# Patient Record
Sex: FEMALE | Race: WHITE | NOT HISPANIC OR LATINO | Employment: FULL TIME | URBAN - METROPOLITAN AREA
[De-identification: names, ages, dates, MRNs, and addresses within clinical notes are randomized per-mention and may not be internally consistent; named-entity substitution may affect disease eponyms.]

---

## 2017-04-13 ENCOUNTER — OFFICE VISIT - RIVER FALLS (OUTPATIENT)
Dept: FAMILY MEDICINE | Facility: CLINIC | Age: 37
End: 2017-04-13

## 2017-04-13 ASSESSMENT — MIFFLIN-ST. JEOR: SCORE: 1578.16

## 2017-06-22 ENCOUNTER — OFFICE VISIT - RIVER FALLS (OUTPATIENT)
Dept: FAMILY MEDICINE | Facility: CLINIC | Age: 37
End: 2017-06-22

## 2017-06-22 ASSESSMENT — MIFFLIN-ST. JEOR: SCORE: 1587.23

## 2017-07-26 ENCOUNTER — OFFICE VISIT - RIVER FALLS (OUTPATIENT)
Dept: FAMILY MEDICINE | Facility: CLINIC | Age: 37
End: 2017-07-26

## 2017-08-17 ENCOUNTER — OFFICE VISIT - RIVER FALLS (OUTPATIENT)
Dept: FAMILY MEDICINE | Facility: CLINIC | Age: 37
End: 2017-08-17

## 2017-08-17 ASSESSMENT — MIFFLIN-ST. JEOR: SCORE: 1583.6

## 2017-10-27 ENCOUNTER — OFFICE VISIT - RIVER FALLS (OUTPATIENT)
Dept: FAMILY MEDICINE | Facility: CLINIC | Age: 37
End: 2017-10-27

## 2017-10-27 ASSESSMENT — MIFFLIN-ST. JEOR: SCORE: 1577.25

## 2017-11-01 ENCOUNTER — TRANSFERRED RECORDS (OUTPATIENT)
Dept: HEALTH INFORMATION MANAGEMENT | Facility: CLINIC | Age: 37
End: 2017-11-01

## 2017-11-01 LAB — HPV ABSTRACT: NORMAL

## 2017-11-13 ENCOUNTER — OFFICE VISIT - RIVER FALLS (OUTPATIENT)
Dept: FAMILY MEDICINE | Facility: CLINIC | Age: 37
End: 2017-11-13

## 2017-11-13 ASSESSMENT — MIFFLIN-ST. JEOR: SCORE: 1580.88

## 2017-11-14 ENCOUNTER — OFFICE VISIT - RIVER FALLS (OUTPATIENT)
Dept: FAMILY MEDICINE | Facility: CLINIC | Age: 37
End: 2017-11-14

## 2018-01-03 ENCOUNTER — OFFICE VISIT - RIVER FALLS (OUTPATIENT)
Dept: FAMILY MEDICINE | Facility: CLINIC | Age: 38
End: 2018-01-03

## 2018-07-18 ENCOUNTER — OFFICE VISIT - RIVER FALLS (OUTPATIENT)
Dept: FAMILY MEDICINE | Facility: CLINIC | Age: 38
End: 2018-07-18

## 2018-07-18 ASSESSMENT — MIFFLIN-ST. JEOR: SCORE: 1618.08

## 2021-08-13 ENCOUNTER — OFFICE VISIT - RIVER FALLS (OUTPATIENT)
Dept: FAMILY MEDICINE | Facility: CLINIC | Age: 41
End: 2021-08-13
Payer: COMMERCIAL

## 2021-08-13 ASSESSMENT — MIFFLIN-ST. JEOR: SCORE: 1613.54

## 2021-08-14 LAB
CHOLEST SERPL-MCNC: 232 MG/DL
CHOLEST/HDLC SERPL: 4.7 {RATIO}
HBA1C MFR BLD: 7.7 %
HDLC SERPL-MCNC: 49 MG/DL
LDLC SERPL CALC-MCNC: 149 MG/DL
NONHDLC SERPL-MCNC: 183 MG/DL
TRIGL SERPL-MCNC: 205 MG/DL
TSH SERPL DL<=0.005 MIU/L-ACNC: 1.06 MIU/L

## 2021-08-17 ENCOUNTER — COMMUNICATION - RIVER FALLS (OUTPATIENT)
Dept: FAMILY MEDICINE | Facility: CLINIC | Age: 41
End: 2021-08-17
Payer: COMMERCIAL

## 2021-08-24 ENCOUNTER — AMBULATORY - RIVER FALLS (OUTPATIENT)
Dept: FAMILY MEDICINE | Facility: CLINIC | Age: 41
End: 2021-08-24
Payer: COMMERCIAL

## 2021-08-25 ENCOUNTER — COMMUNICATION - RIVER FALLS (OUTPATIENT)
Dept: FAMILY MEDICINE | Facility: CLINIC | Age: 41
End: 2021-08-25
Payer: COMMERCIAL

## 2021-08-25 LAB
BUN SERPL-MCNC: 13 MG/DL (ref 7–25)
BUN/CREAT RATIO - HISTORICAL: ABNORMAL (ref 6–22)
CALCIUM SERPL-MCNC: 9.4 MG/DL (ref 8.6–10.2)
CHLORIDE BLD-SCNC: 104 MMOL/L (ref 98–110)
CHOLEST SERPL-MCNC: 201 MG/DL
CHOLEST/HDLC SERPL: 4 {RATIO}
CO2 SERPL-SCNC: 25 MMOL/L (ref 20–32)
CREAT SERPL-MCNC: 0.73 MG/DL (ref 0.5–1.1)
CREAT UR-MCNC: 87 MG/DL (ref 20–275)
EGFRCR SERPLBLD CKD-EPI 2021: 101 ML/MIN/1.73M2
ERYTHROCYTE [DISTWIDTH] IN BLOOD BY AUTOMATED COUNT: 12.4 % (ref 11–15)
GLUCOSE BLD-MCNC: 180 MG/DL (ref 65–99)
HBA1C MFR BLD: 7.4 %
HCT VFR BLD AUTO: 42.3 % (ref 35–45)
HDLC SERPL-MCNC: 50 MG/DL
HGB BLD-MCNC: 13.9 GM/DL (ref 11.7–15.5)
LDLC SERPL CALC-MCNC: 123 MG/DL
MCH RBC QN AUTO: 29.7 PG (ref 27–33)
MCHC RBC AUTO-ENTMCNC: 32.9 GM/DL (ref 32–36)
MCV RBC AUTO: 90.4 FL (ref 80–100)
MICROALBUMIN UR-MCNC: 0.3 MG/DL
MICROALBUMIN/CREAT UR: 3 MG/G{CREAT}
NONHDLC SERPL-MCNC: 151 MG/DL
PLATELET # BLD AUTO: 330 10*3/UL (ref 140–400)
PMV BLD: 9.6 FL (ref 7.5–12.5)
POTASSIUM BLD-SCNC: 4.5 MMOL/L (ref 3.5–5.3)
RBC # BLD AUTO: 4.68 10*6/UL (ref 3.8–5.1)
SODIUM SERPL-SCNC: 138 MMOL/L (ref 135–146)
TRIGL SERPL-MCNC: 167 MG/DL
WBC # BLD AUTO: 7.2 10*3/UL (ref 3.8–10.8)

## 2021-08-30 ENCOUNTER — COMMUNICATION - RIVER FALLS (OUTPATIENT)
Dept: FAMILY MEDICINE | Facility: CLINIC | Age: 41
End: 2021-08-30
Payer: COMMERCIAL

## 2021-08-30 ENCOUNTER — OFFICE VISIT - RIVER FALLS (OUTPATIENT)
Dept: FAMILY MEDICINE | Facility: CLINIC | Age: 41
End: 2021-08-30
Payer: COMMERCIAL

## 2021-08-31 ENCOUNTER — COMMUNICATION - RIVER FALLS (OUTPATIENT)
Dept: FAMILY MEDICINE | Facility: CLINIC | Age: 41
End: 2021-08-31
Payer: COMMERCIAL

## 2021-08-31 LAB — D DIMER PPP FEU-MCNC: 0.22 MCG/ML FEU

## 2021-09-30 ENCOUNTER — AMBULATORY - RIVER FALLS (OUTPATIENT)
Dept: FAMILY MEDICINE | Facility: CLINIC | Age: 41
End: 2021-09-30
Payer: COMMERCIAL

## 2021-10-01 ENCOUNTER — COMMUNICATION - RIVER FALLS (OUTPATIENT)
Dept: FAMILY MEDICINE | Facility: CLINIC | Age: 41
End: 2021-10-01
Payer: COMMERCIAL

## 2021-10-01 LAB
BUN SERPL-MCNC: 16 MG/DL (ref 7–25)
BUN/CREAT RATIO - HISTORICAL: ABNORMAL (ref 6–22)
CALCIUM SERPL-MCNC: 9.2 MG/DL (ref 8.6–10.2)
CHLORIDE BLD-SCNC: 108 MMOL/L (ref 98–110)
CHOLEST SERPL-MCNC: 184 MG/DL
CHOLEST/HDLC SERPL: 3.8 {RATIO}
CO2 SERPL-SCNC: 22 MMOL/L (ref 20–32)
CREAT SERPL-MCNC: 0.84 MG/DL (ref 0.5–1.1)
CREAT UR-MCNC: 109 MG/DL (ref 20–275)
EGFRCR SERPLBLD CKD-EPI 2021: 85 ML/MIN/1.73M2
ERYTHROCYTE [DISTWIDTH] IN BLOOD BY AUTOMATED COUNT: 12.2 % (ref 11–15)
GLUCOSE BLD-MCNC: 211 MG/DL (ref 65–99)
HBA1C MFR BLD: 7.3 %
HCT VFR BLD AUTO: 43 % (ref 35–45)
HDLC SERPL-MCNC: 48 MG/DL
HGB BLD-MCNC: 14.3 GM/DL (ref 11.7–15.5)
LDLC SERPL CALC-MCNC: 116 MG/DL
MCH RBC QN AUTO: 30.4 PG (ref 27–33)
MCHC RBC AUTO-ENTMCNC: 33.3 GM/DL (ref 32–36)
MCV RBC AUTO: 91.3 FL (ref 80–100)
MICROALBUMIN UR-MCNC: 0.4 MG/DL
MICROALBUMIN/CREAT UR: 4 MG/G{CREAT}
NONHDLC SERPL-MCNC: 136 MG/DL
PLATELET # BLD AUTO: 323 10*3/UL (ref 140–400)
PMV BLD: 9.8 FL (ref 7.5–12.5)
POTASSIUM BLD-SCNC: 4.3 MMOL/L (ref 3.5–5.3)
RBC # BLD AUTO: 4.71 10*6/UL (ref 3.8–5.1)
SODIUM SERPL-SCNC: 138 MMOL/L (ref 135–146)
TRIGL SERPL-MCNC: 95 MG/DL
WBC # BLD AUTO: 5.8 10*3/UL (ref 3.8–10.8)

## 2021-10-04 ENCOUNTER — COMMUNICATION - RIVER FALLS (OUTPATIENT)
Dept: FAMILY MEDICINE | Facility: CLINIC | Age: 41
End: 2021-10-04
Payer: COMMERCIAL

## 2021-10-11 ENCOUNTER — COMMUNICATION - RIVER FALLS (OUTPATIENT)
Dept: FAMILY MEDICINE | Facility: CLINIC | Age: 41
End: 2021-10-11
Payer: COMMERCIAL

## 2021-10-12 ENCOUNTER — COMMUNICATION - RIVER FALLS (OUTPATIENT)
Dept: FAMILY MEDICINE | Facility: CLINIC | Age: 41
End: 2021-10-12
Payer: COMMERCIAL

## 2021-10-21 ENCOUNTER — OFFICE VISIT - RIVER FALLS (OUTPATIENT)
Dept: FAMILY MEDICINE | Facility: CLINIC | Age: 41
End: 2021-10-21
Payer: COMMERCIAL

## 2021-10-21 ASSESSMENT — MIFFLIN-ST. JEOR: SCORE: 1598.12

## 2021-10-26 ENCOUNTER — COMMUNICATION - RIVER FALLS (OUTPATIENT)
Dept: FAMILY MEDICINE | Facility: CLINIC | Age: 41
End: 2021-10-26
Payer: COMMERCIAL

## 2021-11-04 ENCOUNTER — OFFICE VISIT - RIVER FALLS (OUTPATIENT)
Dept: FAMILY MEDICINE | Facility: CLINIC | Age: 41
End: 2021-11-04
Payer: COMMERCIAL

## 2021-11-04 ASSESSMENT — MIFFLIN-ST. JEOR: SCORE: 1597.21

## 2021-11-11 ENCOUNTER — OFFICE VISIT - RIVER FALLS (OUTPATIENT)
Dept: FAMILY MEDICINE | Facility: CLINIC | Age: 41
End: 2021-11-11
Payer: COMMERCIAL

## 2021-11-17 DIAGNOSIS — J45.909 ASTHMA: Primary | ICD-10-CM

## 2021-11-18 ENCOUNTER — TRANSCRIBE ORDERS (OUTPATIENT)
Dept: OTHER | Age: 41
End: 2021-11-18

## 2021-11-18 DIAGNOSIS — J45.909 ASTHMA: Primary | ICD-10-CM

## 2021-11-24 ENCOUNTER — OFFICE VISIT - RIVER FALLS (OUTPATIENT)
Dept: FAMILY MEDICINE | Facility: CLINIC | Age: 41
End: 2021-11-24
Payer: COMMERCIAL

## 2021-12-08 ENCOUNTER — COMMUNICATION - RIVER FALLS (OUTPATIENT)
Dept: FAMILY MEDICINE | Facility: CLINIC | Age: 41
End: 2021-12-08
Payer: COMMERCIAL

## 2021-12-30 ENCOUNTER — AMBULATORY - RIVER FALLS (OUTPATIENT)
Dept: FAMILY MEDICINE | Facility: CLINIC | Age: 41
End: 2021-12-30
Payer: COMMERCIAL

## 2022-01-19 ENCOUNTER — OFFICE VISIT - RIVER FALLS (OUTPATIENT)
Dept: FAMILY MEDICINE | Facility: CLINIC | Age: 42
End: 2022-01-19
Payer: COMMERCIAL

## 2022-02-12 VITALS
BODY MASS INDEX: 41.99 KG/M2 | DIASTOLIC BLOOD PRESSURE: 76 MMHG | OXYGEN SATURATION: 98 % | OXYGEN SATURATION: 99 % | TEMPERATURE: 98 F | BODY MASS INDEX: 42.67 KG/M2 | DIASTOLIC BLOOD PRESSURE: 60 MMHG | HEIGHT: 61 IN | HEIGHT: 61 IN | HEART RATE: 76 BPM | OXYGEN SATURATION: 98 % | HEIGHT: 61 IN | TEMPERATURE: 98.1 F | WEIGHT: 222.6 LBS | BODY MASS INDEX: 43.05 KG/M2 | DIASTOLIC BLOOD PRESSURE: 82 MMHG | BODY MASS INDEX: 42.03 KG/M2 | WEIGHT: 222.4 LBS | SYSTOLIC BLOOD PRESSURE: 108 MMHG | TEMPERATURE: 98.7 F | HEIGHT: 61 IN | WEIGHT: 226 LBS | SYSTOLIC BLOOD PRESSURE: 128 MMHG | SYSTOLIC BLOOD PRESSURE: 116 MMHG | HEART RATE: 77 BPM

## 2022-02-12 VITALS
SYSTOLIC BLOOD PRESSURE: 120 MMHG | HEIGHT: 61 IN | DIASTOLIC BLOOD PRESSURE: 68 MMHG | DIASTOLIC BLOOD PRESSURE: 74 MMHG | HEART RATE: 84 BPM | BODY MASS INDEX: 41.42 KG/M2 | TEMPERATURE: 97.6 F | WEIGHT: 220.2 LBS | TEMPERATURE: 98.3 F | SYSTOLIC BLOOD PRESSURE: 120 MMHG | WEIGHT: 216.4 LBS | HEIGHT: 61 IN | SYSTOLIC BLOOD PRESSURE: 116 MMHG | HEART RATE: 68 BPM | WEIGHT: 219.4 LBS | HEART RATE: 76 BPM | BODY MASS INDEX: 41.23 KG/M2 | DIASTOLIC BLOOD PRESSURE: 64 MMHG | BODY MASS INDEX: 41.57 KG/M2 | TEMPERATURE: 98.5 F

## 2022-02-12 VITALS
SYSTOLIC BLOOD PRESSURE: 114 MMHG | WEIGHT: 227 LBS | HEART RATE: 90 BPM | HEIGHT: 61 IN | DIASTOLIC BLOOD PRESSURE: 64 MMHG | OXYGEN SATURATION: 98 % | BODY MASS INDEX: 42.86 KG/M2 | TEMPERATURE: 98.3 F

## 2022-02-12 VITALS
OXYGEN SATURATION: 98 % | BODY MASS INDEX: 41.16 KG/M2 | HEART RATE: 88 BPM | HEIGHT: 61 IN | BODY MASS INDEX: 41.31 KG/M2 | DIASTOLIC BLOOD PRESSURE: 64 MMHG | WEIGHT: 218.8 LBS | TEMPERATURE: 97.9 F | WEIGHT: 218 LBS | SYSTOLIC BLOOD PRESSURE: 120 MMHG | SYSTOLIC BLOOD PRESSURE: 110 MMHG | BODY MASS INDEX: 41.68 KG/M2 | HEART RATE: 80 BPM | HEART RATE: 63 BPM | DIASTOLIC BLOOD PRESSURE: 72 MMHG | TEMPERATURE: 97.9 F | SYSTOLIC BLOOD PRESSURE: 108 MMHG | HEIGHT: 61 IN | HEIGHT: 61 IN | DIASTOLIC BLOOD PRESSURE: 64 MMHG

## 2022-02-12 VITALS
HEIGHT: 61 IN | HEIGHT: 61 IN | SYSTOLIC BLOOD PRESSURE: 131 MMHG | BODY MASS INDEX: 43.25 KG/M2 | TEMPERATURE: 98.7 F | DIASTOLIC BLOOD PRESSURE: 82 MMHG | BODY MASS INDEX: 43.25 KG/M2 | HEART RATE: 85 BPM

## 2022-02-12 VITALS
WEIGHT: 218.2 LBS | HEART RATE: 76 BPM | TEMPERATURE: 98.5 F | BODY MASS INDEX: 41.19 KG/M2 | DIASTOLIC BLOOD PRESSURE: 72 MMHG | SYSTOLIC BLOOD PRESSURE: 118 MMHG | HEIGHT: 61 IN

## 2022-02-12 VITALS
TEMPERATURE: 98.5 F | HEIGHT: 61 IN | BODY MASS INDEX: 41.8 KG/M2 | DIASTOLIC BLOOD PRESSURE: 76 MMHG | HEART RATE: 80 BPM | SYSTOLIC BLOOD PRESSURE: 104 MMHG

## 2022-02-16 NOTE — PROGRESS NOTES
Patient:   AYAKA ALCANTAR            MRN: [id number]            FIN: [id number]               Age:   41 years     Sex:  Female     :  1980   Associated Diagnoses:   Diabetes mellitus, type 2; Obese   Author:   Daniela Adhikari      Visit Information   Visit type:  Diabetes Self Management Education .    Referral source:  Inocente Gutierrez MD.       Chief Complaint   DM Type II, Obesity       History of Present Illness   Pt with hx of GDM requiring insulin therapy/ pump.  Pt was not tested for DM yearly.    New dx of DM Type II with a1c   Group Detail Date Value w/Units Flags      General Chemistry Hgb A1c 2021  7.3  HI      General Chemistry Hgb A1c 2021  7.4  HI      General Chemistry Hgb A1c 2021  7.7  HI          Discussed nutrition, diabetes, and lifestyle management with pt  Nutrition:  Pt has cut out regular soda, train mix and other high carb foods, trying to eat lower carb until glucose readings   am protein shake, noon and evening meals protein, vegetables, previously had been more starches  Fluids: water, milk   Physical Activity:  ~4x/ week works out with her sister who is a  cardio and weights   Stress:   mod  Sleep: will discuss during follow up consults    Support: family, friends   BG Testing: has been testing when not feeling well with lowest being in 130's up to 400's   DM related medication: metformin ER 500mg 4 tabs - taking as directed, will be starting GLP-1 RA    Routine diabetes care:  will discuss during follow up consults    Dilated Retinal Eye Exam:    Skin:   Dental:   Feet:   Immunizations:   Hgb A1c: 7.3% = 163 mg/dL estimated average glucose      Health Status   Allergies:    Allergic Reactions (Selected)  No Known Medication Allergies   Medications:  (Selected)   Prescriptions  Prescribed  Advair Diskus 100 mcg-50 mcg inhalation powder: 1 puff(s), Inhale, bid, # 60 EA, 0 Refill(s), Type: Maintenance, Pharmacy: [facility] [facility], 1 puff(s) Inhale bid, 60.75, in, 10/21/21 16:29:00 CDT, Height Measured, 222.6, lb, 10/21/21 16:29:00 CD...  Aspir 81 oral delayed release tablet: = 1 tab(s) ( 81 mg ), Oral, daily, # 90 EA, 0 Refill(s), Type: Maintenance, Pharmacy: [facility], 1 tab(s) Oral daily, 60.75, in, 08/30/21 14:20:00 CDT, Height Measured, 226, lb, 08/13/21 9:5...  MetFORMIN (Eqv-Glucophage XR) 500 mg oral tablet, extended release: = 4 tab(s) ( 2,000 mg ), Oral, daily, # 360 tab(s), 1 Refill(s), Type: Maintenance, Pharmacy: [facility], 4 tab(s) Oral daily, 60.75, in, 08/30/21 14:20:00 CDT, Height Measured, 226, lb, 08/1...  Nexplanon 68 mg subcutaneous implant: 1 EA ( 68 mg ), subcutaneous, once, # 1 EA, 0 Refill(s), Type: Soft Stop, other reason (Rx)  Ozempic 2 mg/1.5 mL (0.25 mg or 0.5 mg dose) subcutaneous solution: See Instructions, Instructions: 0.25 x 2 weeks   0.50 x 3 weeks  rotate injection sites, inject once a week, # 1.5 mL, 0 Refill(s), Type: Maintenance, Pharmacy: [facility], 0.25 x 2 weeks ; 0...  Ventolin HFA 90 mcg/inh inhalation aerosol: 2 puff(s), INH, QID, PRN: for wheezing, # 17 g, 1 Refill(s), Type: Maintenance, Pharmacy: [facility], mask and aerochamber needed, 2 puff(s) Inhale qid,PRN:for wheezing, 60.75, in, 08/13/21 9...  accu chek guide test strips: accu chek guide test strips, See Instructions, Instructions: testing 2x/ day, Supply, # 200 EA, 3 Refill(s), Type: Maintenance, Pharmacy: [facility], testing 2x/ day, 60.75, in, 10/21/21 16:2...  desogestrel-ethinyl estradiol 0.15 mg-0.03 mg oral tablet: 1 tab(s), Oral, daily, # 84 tab(s), 0 Refill(s), Type: Maintenance, Pharmacy:  [facility], 1 tab(s) Oral daily, 60.75, in, 10/21/21 16:29:00 CDT, Height Measured, 222.6, lb, 10/21/21 16:29:00...  rosuvastatin 20 mg oral tablet: = 1 tab(s) ( 20 mg ), po, hs, # 90 EA, 1 Refill(s), Type: Maintenance, Pharmacy: [facility], 1 tab(s) Oral hs, 60.75, in, 08/30/21 14:20:00 CDT, Height Measured, 226, lb, 08/13/21 9:51:00 CDT...  valved holding chamber spacer: valved holding chamber spacer, See Instructions, Instructions: use with albuterol, Supply, # 1 EA, 0 Refill(s), Type: Maintenance, Pharmacy: [facility], use with albuterol, 60.75, in, 08/13/2...,    Medications          *denotes recorded medication          valved holding chamber spacer: See Instructions, use with albuterol, 1 EA, 0 Refill(s).          accu chek guide test strips: See Instructions, testing 2x/ day, 200 EA, 3 Refill(s).          Ventolin HFA 90 mcg/inh inhalation aerosol: 2 puff(s), INH, QID, PRN: for wheezing, 17 g, 1 Refill(s).          Aspir 81 oral delayed release tablet: 81 mg, 1 tab(s), Oral, daily, 90 EA, 0 Refill(s).          desogestrel-ethinyl estradiol 0.15 mg-0.03 mg oral tablet: 1 tab(s), Oral, daily, 84 tab(s), 0 Refill(s).          Nexplanon 68 mg subcutaneous implant: 68 mg, 1 EA, subcutaneous, once, 1 EA, 0 Refill(s).          Advair Diskus 100 mcg-50 mcg inhalation powder: 1 puff(s), Inhale, bid, 60 EA, 0 Refill(s).          MetFORMIN (Eqv-Glucophage XR) 500 mg oral tablet, extended release: 2,000 mg, 4 tab(s), Oral, daily, 360 tab(s), 1 Refill(s).          rosuvastatin 20 mg oral tablet: 20 mg, 1 tab(s), po, hs, 90 EA, 1 Refill(s).          Ozempic 2 mg/1.5 mL (0.25 mg or 0.5 mg dose) subcutaneous solution: See Instructions, 0.25 x 2 weeks   0.50 x 3 weeks  rotate injection sites, inject once a week, 1.5 mL, 0 Refill(s).       Problem  list:    All Problems (Selected)  Diabetes mellitus, type 2 / SNOMED CT 604739212 / Confirmed  Obese / ICD-9-.00 / Probable  Mild major depression / SNOMED CT 125516209 / Confirmed  History of chicken pox / SNOMED CT 308429295 / Confirmed  Abnormal Pap Smear / ICD-9-.9 / Confirmed      Histories   Past Medical History:    Active  Abnormal Pap Smear (796.9)  History of chicken pox (001417708)  Resolved  Pregnancy (195848870):  Resolved on 8/18/1997 at 17 years.  Pregnancy (209708487):  Resolved on 8/12/2003 at 23 years.  Pregnancy (630536013):  Resolved on 1/8/2005 at 24 years.  Pregnancy (093096402):  Resolved on 7/15/2008 at 28 years.  Pregnancy (846771905):  Resolved on 10/6/2010 at 30 years.   Family History:    Diabetes mellitus  Father (Jose)  Asthma  Daughter (Bibi)     Procedure history:    Encounter for initial prescription of implantable subdermal contraceptive (ICD-10-CM Z30.017) performed by Jackie Alvarez on 8/30/2021 at 41 Years.  Comments:  8/30/2021 5:31 PM Dorinda Goldsmith LPN  Nexplanon placed in left arm    Lot# [id number]  Exp. 09/29/2023    Due for removal in 3 years - on or before 08/30/2024  Encounter for surveillance of implantable subdermal contraceptive (ICD-10-CM Z30.46) performed by Jackie Alvarez on 8/30/2021 at 41 Years.  Comments:  8/30/2021 6:26 PM Dorinda Goldsmith LPN  Nexplanon removed from left arm and replaced    Originally placed 11/14/2017  LEEP (SNOMED CT 11497562) on 2/7/2000 at 19 Years.  Colposcopy.  Excision of birthmark on face.   Social History:        Electronic Cigarette/Vaping Assessment            Electronic Cigarette Use: Never.      Alcohol Assessment: Current            1 x per month, 3 drinks/episode average.      Tobacco Assessment            Never (less than 100 in lifetime)      Substance Abuse Assessment: Denies Substance Abuse            Never      Employment and Education Assessment            Human Resources      Home and  Environment Assessment            Marital status: .  Spouse/Partner name: Clovis.      Nutrition and Health Assessment            Type of diet: Regular.      Exercise and Physical Activity Assessment: Regular exercise            Exercise frequency: 3-4 times/week.  Exercise type: Running, Weight lifting.      Sexual Assessment            Sexually active: Yes.  Sexual orientation: Heterosexual.      Other Assessment            Regular menses.  Menstrual duration 6 days.  Cycle interval 30 days.  History of abnormal pap smear.        Physical Examination   Measurements from flowsheet : Measurements   11/4/2021 2:08 PM CDT Height Measured - Standard 60.75 in    Weight Measured - Standard 222.4 lb    BSA 2.08 m2    Body Mass Index 42.36 kg/m2  HI         Review / Management   Results review:  Lab results   9/30/2021 8:03 AM CDT Sodium Level 138 mmol/L    Potassium Level 4.3 mmol/L    Chloride Level 108 mmol/L    CO2 Level 22 mmol/L    Glucose Level 211 mg/dL  HI    BUN 16 mg/dL    Creatinine 0.84 mg/dL    BUN/Creat Ratio NOT APPLICABLE    eGFR 85 mL/min/1.73m2    eGFR African American 99 mL/min/1.73m2    Calcium Level 9.2 mg/dL    Hgb A1c 7.3  HI    Cholesterol 184 mg/dL    Non-  HI    HDL 48 mg/dL  LOW    Chol/HDL Ratio 3.8      HI    Triglyceride 95 mg/dL    U Creatinine 109 mg/dL    U Microalbumin 0.4 mg/dL    Ur Microalb/Creat Ratio 4    WBC 5.8    RBC 4.71    Hgb 14.3 gm/dL    Hct 43.0 %    MCV 91.3 fL    MCH 30.4 pg    MCHC 33.3 gm/dL    RDW 12.2 %    Platelet 323    MPV 9.8 fL   8/30/2021 2:55 PM CDT D-Dimer 0.22 mcg/mL FEU   8/24/2021 8:05 AM CDT Sodium Level 138 mmol/L    Potassium Level 4.5 mmol/L    Chloride Level 104 mmol/L    CO2 Level 25 mmol/L    Glucose Level 180 mg/dL  HI    BUN 13 mg/dL    Creatinine 0.73 mg/dL    BUN/Creat Ratio NOT APPLICABLE    eGFR 101 mL/min/1.73m2    eGFR African American 117 mL/min/1.73m2    Calcium Level 9.4 mg/dL    Hgb A1c 7.4  HI    Cholesterol 201 mg/dL   HI    Non-  HI    HDL 50 mg/dL    Chol/HDL Ratio 4.0      HI    Triglyceride 167 mg/dL  HI    U Creatinine 87 mg/dL    U Microalbumin 0.3 mg/dL    Ur Microalb/Creat Ratio 3    WBC 7.2    RBC 4.68    Hgb 13.9 gm/dL    Hct 42.3 %    MCV 90.4 fL    MCH 29.7 pg    MCHC 32.9 gm/dL    RDW 12.4 %    Platelet 330    MPV 9.6 fL   8/13/2021 10:45 AM CDT Hgb A1c 7.7  HI    Cholesterol 232 mg/dL  HI    Non-  HI    HDL 49 mg/dL  LOW    Chol/HDL Ratio 4.7      HI    Triglyceride 205 mg/dL  HI    TSH 1.06 mIU/L   .       Impression and Plan   Diagnosis     Diabetes mellitus, type 2 (JFY06-YM E11.9).     Obese (OIK29-RB E66.9).       Counseled: Patient, ANIKET Self Care Behaviors   Today the patient was instructed on the basic physiology of diabetes mellitus, BG testing, and lifestyle guidelines.  Healthy Eating - Education on what foods are primary sources of carbohydrates and how intake affects BG readings, edu on carbohydrate counting, reading food labels, appropriate portion sizes, well balanced meals, diabetic plate method as a general rule.  Patient is provided with numerous ideas to incorporate dietary recommendations, meal planning and preparation, mindful eating techniques and weight loss tips.    Being Active - Education on how exercise helps with :    - lowering BG by increasing the muscles ability to take up and use glucose   - weight loss   - healthier heart (improve lipid profile)   - improve sleep, mood, energy   - decrease stress      Monitoring - Today the patient is instructed on recommended testing schedule and blood glucose target levels.    Taking Medication - Education on the 8 core defects of type II diabetes and how the different classes of medications have different primary physiological actions.  Discussed recommendation to add GLP-1 RA.  Reviewed formulary and pt is very comfortable with injections.  Will start on Ozempic, education on Ozempic: proper use, mechanism of action,  indications, dosing, injection schedule, safety and side effects.  Pt is shown a demonstration of the use of the pen and was able to return demonstration without difficulty.   Dose Escalation Schedule   Week  Daily Dose    1 - 2 0.25 mg    3-5 0.5 mg    6 and onward  1.0 mg    Problem Solving - medical support team assistance, resources provided (written and apps, internet); good control of stress  Healthy Coping - support of friends, family, and medical support team   Reducing Risks - Will discuss at f/u apts.  Weight loss goal of 7 - 10% of current body weight pounds as a reasonable goal to help increase insulin sensitivity   .        Goals:  1.  A1c <6.5%  2.  BG testing 2x/ day on 5-7 days/ week before eating 80 - 130 target; two hours after eating 80 - 150mg/dL  3.  Physical active 150 min/ week   4.  Carb controlled heart healthy meal plan <130gm CHO/ day; <200mg Cholesterol/ day   5.  Take medications as directed continue metformin ER 500mg 4 tabs, start Ozempic 0.25 mg weekly injection x 2 weeks, 0.50mg weekly injection x 3 weeks, then 1.0 mg weekly injection as goal dose        Professional Services   Time spent with pt 30 min   cc Dr. Gutierrez

## 2022-02-16 NOTE — LETTER
(Inserted Image. Unable to display)                       2021  Re:  AYAKA NORIEGAJUAN  :  1980        [facility]     [address] [place] [address]      Dear    [facility] ,    The following patient has been referred to your office/practice:  AYAKA BARRERADERIAN     Appointment is pending with Pulmonology. Please contact patient to schedule.        Please refer to the attached clinical documentation for a summary of AYAKA's care.  Please do not hesitate to contact our office if any additional clinical questions arise. All relevant records and transition of care documents should be mailed or faxed.     Your assistance in providing continuity of care is appreciated.         Sincerely,   [address]  (P) [phone]  (F) [phone]

## 2022-02-16 NOTE — PROGRESS NOTES
Chief Complaint    Anxiety med refill, start birth control  History of Present Illness      Patient comes in follow-up of anxiety related to her marriage ending and her  having an affair with her best friend.  She has been using the Valium to get some sleep.  She is still quite distressed over this.  She has had one conversation with her  but does not feel he can understand.  She was given information on psychologist and counselors.  In addition to this she would like to get back on birth control.  She is due for a Pap smear however she is currently on her period.  Review of Systems      Review of systems show that she is not homicidal or suicidal.  It is positive for anxiety and depression.  Physical Exam   Vitals & Measurements    T: 98.5(Tympanic)  HR: 84(Peripheral)  BP: 120/64     HT: 60.75 in  WT: 219.4 lb  BMI: 41.79       Physical exam shows vital signs stable afebrile obviously upset.      Lungs clear      CV regular       exam deferred at this time and will  Assessment/Plan       Anxiety         Refilled the Valium.  Advised her that would keep close track of this to make sure she does not become dependent on it.         Ordered:          desogestrel-ethinyl estradiol, 1 tab(s), PO, Daily, # 28 tab(s), 2 Refill(s), Type: Maintenance, Pharmacy: [id number], 1 tab(s) po daily          11673 office outpatient visit 25 minutes (Charge), Quantity: 1, Anxiety  Oral contraceptive prescribed          73199 office outpatient visit 25 minutes (Charge), Quantity: 1, Oral contraceptive prescribed  Anxiety          Return to Clinic (Request), Return in 2 weeks                Oral contraceptive prescribed         She was given 3 months worth of Desogen but discussed the idea that permanent sterilization would be a better solution.  She is going to think about this.  She will be back in 2 weeks for Pap smear and pelvic exam.        Better than half of the 25 minute visit was spent counseling on  the above problems.         Ordered:          desogestrel-ethinyl estradiol, 1 tab(s), PO, Daily, # 28 tab(s), 2 Refill(s), Type: Maintenance, Pharmacy: [id number], 1 tab(s) po daily          25456 office outpatient visit 25 minutes (Charge), Quantity: 1, Anxiety  Oral contraceptive prescribed          22442 office outpatient visit 25 minutes (Charge), Quantity: 1, Oral contraceptive prescribed  Anxiety          Return to Clinic (Request), Return in 2 weeks                Orders:         diazePAM, 1 tab(s) ( 5 mg ), PO, QID, PRN: for anxiety, # 12 tab(s), 1 Refill(s), Type: Hard Stop, Pharmacy: [id number]         diazePAM, 1 tab(s) ( 5 mg ), PO, QID, PRN: for anxiety, # 12 tab(s), 1 Refill(s), Type: Maintenance, Pharmacy: [id number], 1 tab(s) po qid,PRN:for anxiety         sertraline, 1 tab(s) ( 50 mg ), PO, Daily, # 90 tab(s), 1 Refill(s), Type: Maintenance, Pharmacy: [id number]  Problem List/Past Medical History    Ongoing     Abnormal Pap Smear     Diabetes Mellitus, Gestational     Mild major depression     Obese    Historical     Pregnancy     Pregnancy     Pregnancy     Pregnancy     Pregnancy  Procedure/Surgical History     Postpartum Care only - 76486 (12/09/2010)     Antepartum Care 4-6 Visits - 02829 (05/20/2010)     LEE (02/07/2000)     Colposcopy     Excision of birthmark on face  Medications    Desogen 0.15 mg-0.03 mg oral tablet, 1 tab(s), po, daily, 2 refills    diazePAM 5 mg oral tablet, 5 mg= 1 tab(s), po, qid, PRN, 1 refills    sertraline 50 mg oral tablet, 50 mg= 1 tab(s), po, daily, 1 refills  Allergies    No Known Medication Allergies  Social History    Smoking Status - 08/17/2017     Never smoker     Alcohol - Current, 05/13/2015      1 x per month, 3 drinks/episode average.     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 Exercise  type: Running, Weight lifting.     Home and Environment - 05/13/2015      Marital status: . Spouse/Partner name: Clovis.     Nutrition and Health - 05/13/2015      Type of diet: Regular.     Other - 05/13/2015      Regular menses. Menstrual duration 6 days. Cycle interval 30 days. History of abnormal pap smear.     Sexual - 05/13/2015      Sexually active: Yes. Sexual orientation: Heterosexual.     Substance Abuse - Denies Substance Abuse, 05/13/2015      Never     Tobacco - Denies Tobacco Use, 05/13/2015      Never  Family History    Asthma: Daughter.    Diabetes mellitus: Father.  Immunizations      Vaccine Date Status      influenza virus vaccine, inactivated 12/15/2011 Given      tetanus/diphth/pertuss (Tdap) adult/adol 10/08/2010 Recorded      influenza 10/08/2010 Recorded      Td 04/27/2006 Recorded      MMR (measles/mumps/rubella) 04/06/1995 Recorded      MMR (measles/mumps/rubella) 08/14/1979 Recorded  Lab Results      Results (Last 90 days)      No results located.

## 2022-02-16 NOTE — NURSING NOTE
Comprehensive Intake Entered On:  11/11/2021 11:24 AM CST    Performed On:  11/11/2021 11:18 AM CST by Cherelle Carnes CMA               Summary   Chief Complaint :   DM and asthma follow up. Advair making no difference. Verbal consent granted for video visit.   Menstrual Status :   Menarcheal   Height Measured :   60.75 in(Converted to: 5 ft 1 in, 154.30 cm)    Cherelle Carnes CMA - 11/11/2021 11:18 AM CST   Health Status   Allergies Verified? :   Yes   Medication History Verified? :   Yes   Immunizations Current :   Yes   Medical History Verified? :   Yes   Pre-Visit Planning Status :   Completed   Tobacco Use? :   Never smoker   Cherelle Carnes CMA - 11/11/2021 11:18 AM CST   Consents   Consent for Immunization Exchange :   Consent Granted   Consent for Immunizations to Providers :   Consent Granted   Cherelle Carnes CMA - 11/11/2021 11:18 AM CST   Meds / Allergies   (As Of: 11/11/2021 11:24:12 AM CST)   Allergies (Active)   No Known Medication Allergies  Estimated Onset Date:   Unspecified ; Created By:   Laurie Mitchell CMA; Reaction Status:   Active ; Category:   Drug ; Substance:   No Known Medication Allergies ; Type:   Allergy ; Updated By:   Laurie Mitchell CMA; Reviewed Date:   11/11/2021 11:24 AM CST        Medication List   (As Of: 11/11/2021 11:24:12 AM CST)   Prescription/Discharge Order    albuterol  :   albuterol ; Status:   Prescribed ; Ordered As Mnemonic:   Ventolin HFA 90 mcg/inh inhalation aerosol ; Simple Display Line:   2 puff(s), INH, QID, PRN: for wheezing, 17 g, 1 Refill(s) ; Ordering Provider:   Jackie Alvarez; Catalog Code:   albuterol ; Order Dt/Tm:   8/13/2021 10:16:45 AM CDT          aspirin  :   aspirin ; Status:   Prescribed ; Ordered As Mnemonic:   Aspir 81 oral delayed release tablet ; Simple Display Line:   81 mg, 1 tab(s), Oral, daily, 90 EA, 0 Refill(s) ; Ordering Provider:   Jackie Alvarez; Catalog Code:   aspirin ; Order Dt/Tm:   8/30/2021 2:37:39 PM CDT           desogestrel-ethinyl estradiol  :   desogestrel-ethinyl estradiol ; Status:   Prescribed ; Ordered As Mnemonic:   desogestrel-ethinyl estradiol 0.15 mg-0.03 mg oral tablet ; Simple Display Line:   1 tab(s), Oral, daily, 84 tab(s), 0 Refill(s) ; Ordering Provider:   Inocente Gutierrez MD; Catalog Code:   desogestrel-ethinyl estradiol ; Order Dt/Tm:   10/28/2021 8:30:51 AM CDT          etonogestrel  :   etonogestrel ; Status:   Prescribed ; Ordered As Mnemonic:   Nexplanon 68 mg subcutaneous implant ; Simple Display Line:   68 mg, 1 EA, subcutaneous, once, 1 EA, 0 Refill(s) ; Ordering Provider:   Inocente Gutierrez MD; Catalog Code:   etonogestrel ; Order Dt/Tm:   11/14/2017 4:04:58 PM CST          fluticasone-salmeterol  :   fluticasone-salmeterol ; Status:   Prescribed ; Ordered As Mnemonic:   Advair Diskus 100 mcg-50 mcg inhalation powder ; Simple Display Line:   1 puff(s), Inhale, bid, 60 EA, 0 Refill(s) ; Ordering Provider:   Inocente Gutierrez MD; Catalog Code:   fluticasone-salmeterol ; Order Dt/Tm:   10/21/2021 4:50:18 PM CDT          metFORMIN  :   metFORMIN ; Status:   Prescribed ; Ordered As Mnemonic:   MetFORMIN (Eqv-Glucophage XR) 500 mg oral tablet, extended release ; Simple Display Line:   2,000 mg, 4 tab(s), Oral, daily, 360 tab(s), 1 Refill(s) ; Ordering Provider:   Jackie Alvarez; Catalog Code:   metFORMIN ; Order Dt/Tm:   10/1/2021 1:51:44 PM CDT          Miscellaneous Prescription  :   Miscellaneous Prescription ; Status:   Prescribed ; Ordered As Mnemonic:   valved holding chamber spacer ; Simple Display Line:   See Instructions, use with albuterol, 1 EA, 0 Refill(s) ; Ordering Provider:   Jackie Alvarez; Catalog Code:   Miscellaneous Prescription ; Order Dt/Tm:   8/13/2021 10:16:50 AM CDT          Miscellaneous Rx Supply  :   Miscellaneous Rx Supply ; Status:   Prescribed ; Ordered As Mnemonic:   accu chek guide test strips ; Simple Display Line:   See Instructions, testing 2x/  day, 200 EA, 3 Refill(s) ; Ordering Provider:   Inocente Gutierrez MD; Catalog Code:   Miscellaneous Rx Supply ; Order Dt/Tm:   11/4/2021 2:03:14 PM CDT          rosuvastatin  :   rosuvastatin ; Status:   Prescribed ; Ordered As Mnemonic:   rosuvastatin 20 mg oral tablet ; Simple Display Line:   20 mg, 1 tab(s), po, hs, 90 EA, 1 Refill(s) ; Ordering Provider:   Jackie Alvarez; Catalog Code:   rosuvastatin ; Order Dt/Tm:   8/30/2021 2:35:55 PM CDT          semaglutide  :   semaglutide ; Status:   Prescribed ; Ordered As Mnemonic:   Ozempic 2 mg/1.5 mL (0.25 mg or 0.5 mg dose) subcutaneous solution ; Simple Display Line:   See Instructions, 0.25 x 2 weeks   0.50 x 3 weeks  rotate injection sites, inject once a week, 1.5 mL, 0 Refill(s) ; Ordering Provider:   Inocente Gutierrez MD; Catalog Code:   semaglutide ; Order Dt/Tm:   11/4/2021 2:01:59 PM CDT

## 2022-02-16 NOTE — TELEPHONE ENCOUNTER
---------------------  From: Carlota Carrillo RN (Phone Messages Pool ([id number]))   To: T Message Pool ([address] [place] [address]); AYAKA ALCANTAR    Sent: 10/11/2021 4:41:39 PM CDT  Subject: Consumer message: FW: Ayaka Hatfield,    Your message is being forwarded to Dr. Gutierrez for review and further direction. He is out of the clinic for the rest of the day and will return tomorrow, Tues, Oct. 12.     Thank you,  Carlota MEDINA RN      ---------------------  From: AYAKA ALCANTAR  To: [facility]  Sent: 10/11/2021 04:34 p.m. CDT  Subject: Dr Gutierrez  I am still bleeding. I do not remember this happening the last time I had a nexplenon. I have done a bunch of reading and it sounds like it can be normal but I do not like it. Is there a pill I can take for a month to try and regulate it or should I just schedule removal? Kojo Rucker---------------------  From: Dorinda Baez LPN ([facility] Message Pool ([id number]))   To: Inocente Gutierrez MD;     Sent: 10/12/2021 7:57:54 AM CDT  Subject: FW: Consumer message: FW: Dr Gutierrez---------------------  From: Inocente Gutierrez MD   To: [facility] Message Pool ([id number]); AYAKA ALCANTAR    Sent: 10/12/2021 8:00:28 AM CDT  Subject: RE: Consumer message: FW: Dr Matt Varma,    There are a couple things we can do but I would like to see you to check a hemoglobin.  Can you please make an appointment to see me?    Jian Gutierrez MD---------------------  From: Cherelle Carnes CMA ([facility] Message Pool (32224_[facility]))   To: AYAKA ALCANTAR    Sent: 10/12/2021 9:37:33 AM CDT  Subject: FW: Consumer message: FW: Dr Gutierrez

## 2022-02-16 NOTE — TELEPHONE ENCOUNTER
---------------------  From: Jackie Alvarez   To: AYAKA ALCANTAR    Sent: 8/17/2021 7:46:13 AM CDT  Subject: General Message     We spoke on the phone this morning, Kojo.  1.  Your chest X ray needs to be repeated, see me to replace the Nexplanon and we can repeat it that ay.  2.. Your lab work shows you have diabetes. This may be causing your fatigue.  Please schedule an appointment for fasting blood work and we will repeat a diabetes test to confirm this and repeat the cholesterol numbers fasting as well as a couple kidney tests.      When you schedule the appointment for the X-ray and Nexplanon, tell the schedulers to make it for 40 minutes as we will also need to talk about treating diabetes.    See you soon, thank you!    DAREN Villalobos      Results:  Date Result Name Ind Value Ref Range   8/13/2021 10:45 AM Hgb A1c ((H)) 7.7 ( - <5.7)   8/13/2021 10:45 AM Cholesterol ((H)) 232 mg/dL ( - <200)   8/13/2021 10:45 AM Non-HDL Cholesterol ((H)) 183 ( - <130)   8/13/2021 10:45 AM HDL ((L)) 49 mg/dL (> OR = 50 - )   8/13/2021 10:45 AM Cholesterol/HDL Ratio  4.7 ( - <5.0)   8/13/2021 10:45 AM LDL ((H)) 149    8/13/2021 10:45 AM Triglyceride ((H)) 205 mg/dL ( - <150)   8/13/2021 10:45 AM TSH  1.06 mIU/L

## 2022-02-16 NOTE — TELEPHONE ENCOUNTER
---------------------  From: Sierra Kaur RN (Phone Messages Pool ([id number]_[facility]))   To: [facility] Message Pool ([id number]_[facility]);     Sent: 10/4/2021 2:08:50 PM CDT  Subject: CONSUMER MESSAGE  FW: FW: Jackie Alvarez           ---------------------  From: AYAKA ALCANTAR  To: [facility]  Sent: 10/04/2021 02:02 p.m. CDT  Subject: RE: FW: Jackie Alvarez  Yes please.  Thanks.  ---------------------  From: Sierra Kaur RN (Phone Messages Pool ([id number]))  To: AYAKA ALCANTAR  Sent: 10/4/2021 1:31:14 PM CDT  Subject: RE: FW: Jackie Alvarez Dr. Tashjian will be back in clinic tomorrow if you would like me to forward this to him.       Thank you,  Macie SALMON RN                 ---------------------  From: AYAKA ALCANTAR  To: [facility]  Sent: 10/04/2021 01:29 p.m. CDT  Subject: RE: FW: Jackie Alvarez  Please have another provider look this over.  I believe my primary Dr. Gutierrez is out as well.  Thanks.  Kojo  ---------------------  From: Maira Bland MA (Phone Messages Pool ([id number]_[facility]))  To: AYAKA ALCANTAR  Sent: 10/4/2021 1:25:03 PM CDT  Subject: FW: Jackie Alvarez,       Jackie Gato is out of clinic until 10/18/2021.  Please let us know if you want this to wait until she gets back or to send to another provider.       Sincerely,       Maira ZHENG CMA            ---------------------  From: AYAKA ALCANTAR  To: [facility]  Sent: 10/04/2021 01:18 p.m. CDT  Subject: Gato ADAMS, Jackie Mejia.  I wanted to discuss a few things.  #1. My A1c only went down by .01.  I have been on the metformin for over a month.  Don t you think that should have improved more?  #2. My fasting blood sugar was 211.  That is high!  It makes me anxious.  I am so tired a lot and I don t understand.  Would insulin help me?  When I had diabetes with my  "pregnancies I always had insulin and with my #5 baby I had a pump.  I just hate worrying about it.  #3. I have been bleeding for about 10 days.  I don t think that I did the first time I had the Nexplanon and because it has been longer than a week I am concerned about it.  It s not just spotting, it is a steady flow.  Do you think that the new meds I am on would make a difference?  #4. Lastly.  You prescribed me cholesterol meds.  I know a couple people close to me that have had a lot of issues with \"statin\" drugs.  My Dad takes 5mg only 2x a week and I m supposed to take 20mg a day!  I have ready about it and I am nervous to take it.  I know everyone is different but my cholesterol was only like 1 over the normal range.  I just want to make sure this is how much I need to take.  Thanks for answering my questions and I look forward to hearing back from you.  Thanks.  Kojo  "

## 2022-02-16 NOTE — TELEPHONE ENCOUNTER
---------------------  From: Lillie Membreno MA (Phone Messages Pool (32224_[facility] [facility] [place]))   To: Chato Win MD;     Sent: 6/3/2020 8:22:26 AM CDT  Subject: Phone Note: Foot pain     PCP:   [facility]T      Time of Call:  8:15am       Person Calling:  Kojo   Phone number:  [phone]    Returned call at: _    Note:   Patient called stating she has had a pain in the top of her foot that started Sunday. Patient states she was just walking around in and out of her cabin and got a pain in the top of her foot. No injury at all, pain has not gone away. She has been icing it and using ibuprofen. She does have an in clinic appointment with Dr. Brown, but is wondering if this is something that could be discussed in a video visit or if its more of something she should be seen in clinic for. States her significant other said it could be a stress fracture but it really unsure. She asked that we reach out to you to see what your thoughts are.     Pharmacy: n/a    Last office visit and reason:  7/18/18    Transferred to: TIFFANY think face to face visit would work better so that if imaging like an XR was needed, could be done. If she prefers a video visit I do have openings later today.---------------------  From: Chato Win MD   To: Phone Messages Pool (32224_[facility] [facility]);     Sent: 6/3/2020 8:40:32 AM CDT  Subject: RE: Phone Note: Foot painCalled and left message letting patient know

## 2022-02-16 NOTE — TELEPHONE ENCOUNTER
---------------------  From: Dorinda Baez LPN   To: Referral Coordinators Pool ([id number]_[facility]);     Sent: 8/30/2021 5:29:08 PM CDT  Subject: Imaging Order       An order has been placed for patient to have a chest CT with IV contrast.

## 2022-02-16 NOTE — TELEPHONE ENCOUNTER
---------------------  From: Sierra Kaur RN (Phone Messages Pool ([id number]_[facility]))   To: [facility] Message Pool (32224_[facility]);     Sent: 10/12/2021 9:35:32 AM CDT  Subject: CONSUMER MESSAGE  FW: Dr. Gutierrez           ---------------------  From: AYAKA ALCANTAR  To: [facility]  Sent: 10/12/2021 09:22 a.m. CDT  Subject: Dr. Gutierrez  I made an appointment but the soonest I could get in is next Thursday afternoon.  If something sooner opens up I would like to come in earlier.  Mala

## 2022-02-16 NOTE — PROGRESS NOTES
Chief Complaint    c/o ongoing trouble breathing/wheezing, inhalers not helping. Also having trouble with diabetes/fatigue. Originally made the appt for BTB with Nexplanon but that has since subsided.  History of Present Illness      Patient was initially scheduled to come in for breakthrough bleeding for her nexplanon however that seems to have resolved itself. She is having problems with her diabetes and her reactive airway disease / asthma. Her most recent A1C was 7.3 which is good however she stated that she had a blood sugar of over 400 yesterday. She is only on metformin now. She continues to have problems wheezing with exercise.  Review of Systems      Otherwise non-contributory  Physical Exam   Vitals & Measurements    T: 98.7  F (Tympanic)  HR: 77 (Peripheral)  BP: 108/60  SpO2: 99%     HT: 60.75 in  WT: 222.6 lb  BMI: 42.4       Long show some decreased breath sounds bilaterally with and expiratory wheezes         CV regular        Blood sugar was 145 on an Accu check  Assessment/Plan       Asthma (J45.909)          Not controlled will add advair and if not improving would like to send to pulmonology.         Ordered:          fluticasone-salmeterol, 1 puff(s), Inhale, bid, # 60 EA, 0 Refill(s), Type: Maintenance, Pharmacy: [facility] [employer] [facility], 1 puff(s) Inhale bid, 60.75, in, 10/21/21 16:29:00 CDT, Height Measured, 222.6, lb, 10/21/21 16:29:00 CD..., (Ordered)                Diabetes mellitus, type 2 (E11.9)          We did get her her own meter. She will start to monitor. I think she would benefit from a glp one and that might help her with weight loss as well.         She will follow up with me one week later after she sees SARINA Maurer.                Orders:         fluticasone, = 2 puff(s), Inhale, bid, # 1 EA, 2 Refill(s), Type: Maintenance, Pharmacy: [facility] [employer] [facility], 2 puff(s) Inhale  bid,x30 day(s), 60.75, in, 08/13/21 9:51:00 CDT, Height Measured, 226, lb, 08/13/21 9:..., (Completed)  Patient Information     Name:AYAKA ALCANTAR      Address:      [address] [place] [id number]     Sex:Female     YOB: 1980     [phone]     Emergency Contact:DANGELO PEDRO     MRN:[id number]     FIN:[id number]     Location:[facility]     Date of Service:10/21/2021      Primary Care Physician:       Inocente Gutierrez MD, [phone]      Attending Physician:       Inocente Gutierrez MD, [phone]  Problem List/Past Medical History    Ongoing     Abnormal Pap Smear     Diabetes mellitus, type 2     History of chicken pox     Mild major depression     Obese    Historical     Pregnancy     Pregnancy     Pregnancy     Pregnancy     Pregnancy  Procedure/Surgical History     Encounter for initial prescription of implantable subdermal contraceptive (08/30/2021)      Comments: Nexplanon placed in left arm      Lot# [id number]      Exp. 09/29/2023      Due for removal in 3 years - on or before 08/30/2024.     Encounter for surveillance of implantable subdermal contraceptive (08/30/2021)      Comments: Nexplanon removed from left arm and replaced      Originally placed 11/14/2017.     LEEP (02/07/2000)     Colposcopy     Excision of birthmark on face  Medications    Advair Diskus 100 mcg-50 mcg inhalation powder, 1 puff(s), Inhale, bid    Aspir 81 oral delayed release tablet, 81 mg= 1 tab(s), Oral, daily    MetFORMIN (Eqv-Glucophage XR) 500 mg oral tablet, extended release, 2000 mg= 4 tab(s), Oral, daily, 1 refills    Nexplanon 68 mg subcutaneous implant, 68 mg= 1 EA, Subcutaneous, once    rosuvastatin 20 mg oral tablet, 20 mg= 1 tab(s), Oral, hs, 1 refills    valved holding chamber spacer, See Instructions    Ventolin HFA 90 mcg/inh inhalation aerosol, 2 puff(s), Inhale, qid, PRN, 1 refills  Allergies    No Known Medication Allergies  Social History    Smoking  Status     Never smoker     Alcohol - Current      1 x per month, 3 drinks/episode average.     Electronic Cigarette/Vaping      Electronic Cigarette Use: Never.     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 Exercise type: Running, Weight lifting.     Home/Environment      Marital status: . Spouse/Partner name: Clovis.     Nutrition/Health      Type of diet: Regular.     Other      Regular menses. Menstrual duration 6 days. Cycle interval 30 days. History of abnormal pap smear.     Sexual      Sexually active: Yes. Sexual orientation: Heterosexual.     Substance Abuse - Denies Substance Abuse      Never     Tobacco      Never (less than 100 in lifetime)  Family History    Asthma: Daughter.    Diabetes mellitus: Father.  Lab Results          Lab Results (Last 4 results within 90 days)           Sodium Level: 138 mmol/L [135 mmol/L - 146 mmol/L] (09/30/21 08:03:00)          Sodium Level: 138 mmol/L [135 mmol/L - 146 mmol/L] (08/24/21 08:05:00)          Potassium Level: 4.3 mmol/L [3.5 mmol/L - 5.3 mmol/L] (09/30/21 08:03:00)          Potassium Level: 4.5 mmol/L [3.5 mmol/L - 5.3 mmol/L] (08/24/21 08:05:00)          Chloride Level: 108 mmol/L [98 mmol/L - 110 mmol/L] (09/30/21 08:03:00)          Chloride Level: 104 mmol/L [98 mmol/L - 110 mmol/L] (08/24/21 08:05:00)          CO2 Level: 22 mmol/L [20 mmol/L - 32 mmol/L] (09/30/21 08:03:00)          CO2 Level: 25 mmol/L [20 mmol/L - 32 mmol/L] (08/24/21 08:05:00)          Glucose Level: 211 mg/dL High [65 mg/dL - 99 mg/dL] (09/30/21 08:03:00)          Glucose Level: 180 mg/dL High [65 mg/dL - 99 mg/dL] (08/24/21 08:05:00)          BUN: 16 mg/dL [7 mg/dL - 25 mg/dL] (09/30/21 08:03:00)          BUN: 13 mg/dL [7 mg/dL - 25 mg/dL] (08/24/21 08:05:00)          Creatinine Level: 0.84 mg/dL [0.5 mg/dL - 1.1 mg/dL] (09/30/21 08:03:00)          Creatinine Level: 0.73 mg/dL [0.5 mg/dL - 1.1 mg/dL] (08/24/21  08:05:00)          BUN/Creat Ratio: NOT APPLICABLE [6  - 22] (09/30/21 08:03:00)          BUN/Creat Ratio: NOT APPLICABLE [6  - 22] (08/24/21 08:05:00)          eGFR: 85 mL/min/1.73m2 (09/30/21 08:03:00)          eGFR: 101 mL/min/1.73m2 (08/24/21 08:05:00)          eGFR African American: 99 mL/min/1.73m2 (09/30/21 08:03:00)          eGFR African American: 117 mL/min/1.73m2 (08/24/21 08:05:00)          Calcium Level: 9.2 mg/dL [8.6 mg/dL - 10.2 mg/dL] (09/30/21 08:03:00)          Calcium Level: 9.4 mg/dL [8.6 mg/dL - 10.2 mg/dL] (08/24/21 08:05:00)          Hgb A1c: 7.3 High (09/30/21 08:03:00)          Hgb A1c: 7.4 High (08/24/21 08:05:00)          Hgb A1c: 7.7 High (08/13/21 10:45:00)          Cholesterol: 184 mg/dL (09/30/21 08:03:00)          Cholesterol: 201 mg/dL High (08/24/21 08:05:00)          Cholesterol: 232 mg/dL High (08/13/21 10:45:00)          Non-HDL Cholesterol: 136 High (09/30/21 08:03:00)          Non-HDL Cholesterol: 151 High (08/24/21 08:05:00)          Non-HDL Cholesterol: 183 High (08/13/21 10:45:00)          HDL: 48 mg/dL Low (09/30/21 08:03:00)          HDL: 50 mg/dL (08/24/21 08:05:00)          HDL: 49 mg/dL Low (08/13/21 10:45:00)          Cholesterol/HDL Ratio: 3.8 (09/30/21 08:03:00)          Cholesterol/HDL Ratio: 4 (08/24/21 08:05:00)          Cholesterol/HDL Ratio: 4.7 (08/13/21 10:45:00)          LDL: 116 High (09/30/21 08:03:00)          LDL: 123 High (08/24/21 08:05:00)          LDL: 149 High (08/13/21 10:45:00)          Triglyceride: 95 mg/dL (09/30/21 08:03:00)          Triglyceride: 167 mg/dL High (08/24/21 08:05:00)          Triglyceride: 205 mg/dL High (08/13/21 10:45:00)          TSH: 1.06 mIU/L (08/13/21 10:45:00)          U Creatinine: 109 mg/dL [20 mg/dL - 275 mg/dL] (09/30/21 08:03:00)          U Creatinine: 87 mg/dL [20 mg/dL - 275 mg/dL] (08/24/21 08:05:00)          U Microalbumin: 0.4 mg/dL (09/30/21 08:03:00)          U Microalbumin: 0.3 mg/dL (08/24/21 08:05:00)           Ur Microalbumin/Creatinine Ratio: 4 (09/30/21 08:03:00)          Ur Microalbumin/Creatinine Ratio: 3 (08/24/21 08:05:00)          WBC: 5.8 [3.8  - 10.8] (09/30/21 08:03:00)          WBC: 7.2 [3.8  - 10.8] (08/24/21 08:05:00)          RBC: 4.71 [3.8  - 5.1] (09/30/21 08:03:00)          RBC: 4.68 [3.8  - 5.1] (08/24/21 08:05:00)          Hgb: 14.3 gm/dL [11.7 gm/dL - 15.5 gm/dL] (09/30/21 08:03:00)          Hgb: 13.9 gm/dL [11.7 gm/dL - 15.5 gm/dL] (08/24/21 08:05:00)          Hct: 43 % [35 % - 45 %] (09/30/21 08:03:00)          Hct: 42.3 % [35 % - 45 %] (08/24/21 08:05:00)          MCV: 91.3 fL [80 fL - 100 fL] (09/30/21 08:03:00)          MCV: 90.4 fL [80 fL - 100 fL] (08/24/21 08:05:00)          MCH: 30.4 pg [27 pg - 33 pg] (09/30/21 08:03:00)          MCH: 29.7 pg [27 pg - 33 pg] (08/24/21 08:05:00)          MCHC: 33.3 gm/dL [32 gm/dL - 36 gm/dL] (09/30/21 08:03:00)          MCHC: 32.9 gm/dL [32 gm/dL - 36 gm/dL] (08/24/21 08:05:00)          RDW: 12.2 % [11 % - 15 %] (09/30/21 08:03:00)          RDW: 12.4 % [11 % - 15 %] (08/24/21 08:05:00)          Platelet: 323 [140  - 400] (09/30/21 08:03:00)          Platelet: 330 [140  - 400] (08/24/21 08:05:00)          MPV: 9.8 fL [7.5 fL - 12.5 fL] (09/30/21 08:03:00)          MPV: 9.6 fL [7.5 fL - 12.5 fL] (08/24/21 08:05:00)          D-Dimer: 0.22 mcg/mL FEU (08/30/21 14:55:00)  Immunizations          Scheduled Immunizations          Dose Date(s)          influenza virus vaccine, inactivated          01/01/2020          MMR (measles/mumps/rubella)          04/06/1995          Other Immunizations          influenza          10/08/2010          MMR (measles/mumps/rubella)          08/14/1979          influenza virus vaccine, inactivated          12/15/2011, 10/27/2017          Td          04/27/2006          tetanus/diphth/pertuss (Tdap) adult/adol          10/08/2010

## 2022-02-16 NOTE — PROGRESS NOTES
Patient:   AYAKA ALCANTAR            MRN: [id number]            FIN: [id number]               Age:   41 years     Sex:  Female     :  1980   Associated Diagnoses:   Well adult; Family history of thyroid disease; Screening for diabetes mellitus; Screening for lipid disorders; SOB (shortness of breath); Wheezing; Obesity   Author:   Jackie Alvarez      Visit Information      Date of Service: 2021 09:36 am  Performing Location: [facility]  Encounter#: [id number]      Primary Care Provider (PCP):  Inocente Gutierrez MD    NPI# [id number]      Referring Provider:  Jackie Alvarez    NPI# [id number]      Chief Complaint   2021 9:51 AM CDT    scheduled for Nexplanon removal and replacement but would really like to have a px, not sure if she really needs to have the Nexplanon replaced at this time, it is past due for removal        Well Adult History   Well Adult History             The patient presents for well adult exam, She is here for annual, states it has been a while.  1. Her pap is not due, they have been normal with neg HPV, same partner, vaginal symptoms. She has no periods as Nexplanon is in place., it was due to come out about 8 months ago. She is sexually active but her partner has a vasectomy. She has been told that the nexplanon MAY be reliable for longer than 4 years and since she doens't need contraception, she would like to keep it longer as it prevents her from getting her period  2. She has not had a mammogram and will set that up, given information  3. She would like to talk about her SOB. She had COVID infection this spring, maybe march. She states she wasn't sick but she has been increasingly SOB and wheezing. She believes it started a couple months BEFORE the covid infection actually. NO hx of asthma or allergies. She is a non smoker. When she contracted COVID she was given an albuterol inhaler but it doesn't seemt to help, feel the mist goes on her  tongue  4.  She has not had lipids screening or diabetes for years, she has strong FH thyroid dz, she is gaining wt  .  The general health status is good.  The patient's diet is described as not balanced.  Exercise: none.  Associated symptoms consist of weight gain.  Medical encounters:.  Additional pertinent history: tobacco use none.        Review of Systems   Constitutional:  Negative except as documented in history of present illness.    Eye:  Negative except as documented in history of present illness.    Respiratory:  Negative except as documented in history of present illness.    Cardiovascular:  Negative except as documented in history of present illness.    Breast:  Negative.    Gastrointestinal:  Negative except as documented in history of present illness.    Genitourinary:  Negative except as documented in history of present illness.    Gynecologic:  Negative except as documented in history of present illness.    Hematology/Lymphatics:  Negative except as documented in history of present illness.    Endocrine:  Negative except as documented in history of present illness.    Immunologic:  Negative except as documented in history of present illness.    Musculoskeletal:  Negative except as documented in history of present illness.    Integumentary:  Negative except as documented in history of present illness.    Neurologic:  Negative except as documented in history of present illness.    Psychiatric:  Negative except as documented in history of present illness.              Health Status   Allergies:    Allergic Reactions (Selected)  No Known Medication Allergies   Medications:  (Selected)   Prescriptions  Prescribed  Flovent  mcg/inh inhalation aerosol: = 2 puff(s), Inhale, bid, # 1 EA, 2 Refill(s), Type: Maintenance, Pharmacy: [facility], 2 puff(s) Inhale bid,x30 day(s), 60.75, in, 08/13/21 9:51:00 CDT, Height Measured, 226, lb, 08/13/21  9:...  Nexplanon 68 mg subcutaneous implant: 1 EA ( 68 mg ), subcutaneous, once, # 1 EA, 0 Refill(s), Type: Soft Stop, other reason (Rx)  Ventolin HFA 90 mcg/inh inhalation aerosol: 2 puff(s), INH, QID, PRN: for wheezing, # 17 g, 1 Refill(s), Type: Maintenance, Pharmacy: [facility], mask and aerochamber needed, 2 puff(s) Inhale qid,PRN:for wheezing, 60.75, in, 08/13/21 9...  valved holding chamber spacer: valved holding chamber spacer, See Instructions, Instructions: use with albuterol, Supply, # 1 EA, 0 Refill(s), Type: Maintenance, Pharmacy: [facility], use with albuterol, 60.75, in, 08/13/2...   Problem list:    All Problems  Abnormal Pap Smear / ICD-9-.9 / Confirmed  Diabetes Mellitus, Gestational / ICD-9-.80 / Confirmed  History of chicken pox / SNOMED CT 643401714 / Confirmed  Mild major depression / SNOMED CT 920379293 / Confirmed  Obese / ICD-9-.00 / Probable  Resolved: Pregnancy / SNOMED CT 343232086  Resolved: Pregnancy / SNOMED CT 593219728  Resolved: Pregnancy / SNOMED CT 092487170  Resolved: Pregnancy / SNOMED CT 504331355  Resolved: Pregnancy / SNOMED CT 103061807      Histories   Past Medical History:    Active  Abnormal Pap Smear (796.9)  Diabetes Mellitus, Gestational (648.80)  History of chicken pox (977696822)  Resolved  Pregnancy (829164950):  Resolved on 8/18/1997 at 17 years.  Pregnancy (502868118):  Resolved on 8/12/2003 at 23 years.  Pregnancy (974880323):  Resolved on 1/8/2005 at 24 years.  Pregnancy (777393897):  Resolved on 7/15/2008 at 28 years.  Pregnancy (219794341):  Resolved on 10/6/2010 at 30 years.   Family History:    Daughter: Bibi    Asthma  Father: Jose    Diabetes mellitus     Procedure history:    LEEP (SNOMED CT 99408249) on 2/7/2000 at 19 Years.  Colposcopy.  Excision of birthmark on face.   Social History:        Electronic  Cigarette/Vaping Assessment            Electronic Cigarette Use: Never.      Alcohol Assessment: Current            1 x per month, 3 drinks/episode average.      Tobacco Assessment            Never (less than 100 in lifetime)      Substance Abuse Assessment: Denies Substance Abuse            Never      Employment and Education Assessment            Human Resources      Home and Environment Assessment            Marital status: .  Spouse/Partner name: Clovis.      Nutrition and Health Assessment            Type of diet: Regular.      Exercise and Physical Activity Assessment: Regular exercise            Exercise frequency: 3-4 times/week.  Exercise type: Running, Weight lifting.      Sexual Assessment            Sexually active: Yes.  Sexual orientation: Heterosexual.      Other Assessment            Regular menses.  Menstrual duration 6 days.  Cycle interval 30 days.  History of abnormal pap smear.        Physical Examination   Vital Signs   8/13/2021 9:51 AM CDT Temperature Tympanic 98.0 DegF    Peripheral Pulse Rate 76 bpm    Systolic Blood Pressure 116 mmHg    Diastolic Blood Pressure 82 mmHg  HI    Mean Arterial Pressure 93 mmHg    BP Site Right arm    BP Method Manual    Oxygen Saturation 98 %      Measurements from flowsheet : Measurements   8/13/2021 9:51 AM CDT Height Measured - Standard 60.75 in    Weight Measured - Standard 226.0 lb    BSA 2.09 m2    Body Mass Index 43.05 kg/m2  HI      General:  Alert and oriented, No acute distress, vital signs stable, as noted above.    Eye:  Pupils are equal, round and reactive to light, Extraocular movements are intact, Normal conjunctiva.    HENT:  Normocephalic, Tympanic membranes are clear, Normal hearing.    Neck:  Supple, Non-tender, No lymphadenopathy, No thyromegaly.    Respiratory:  Symmetrical chest wall expansion, she has wheezing bilat all fields.    Cardiovascular:  Normal rate, Regular rhythm, No murmur, No edema.    Breast:  No mass, No tenderness,  No discharge, Breasts examined .  No infra nor supraclavicular nodes palpable.  No axillary nodes or masses palpable.  No nipple discharge. Breasts normal throughout.    Gastrointestinal:  Soft, Non-tender, Non-distended, No organomegaly.    Musculoskeletal:  Normal range of motion, Normal strength, No deformity, Normal gait.    Integumentary:  Warm, Dry, Pink, Intact, No rash.    Neurologic:  Alert, Oriented, Normal sensory, Normal motor function, Cranial Nerves II-XII are grossly intact.    Psychiatric:  Cooperative, Appropriate mood & affect, Normal judgment, PHQ 9/CAGE questionaire reviewed and discussed with patient,  see score.       Review / Management   Radiology results   X-ray, X-ray reviewed with patient, no abnormality noted.  Will await radiology over-read and if differs such that treatment would be altered,  will notify patient.       Impression and Plan   Diagnosis     Well adult (PFE78-QM Z00.00).     Family history of thyroid disease (VLN05-IH Z83.49).     Obesity (CMQ82-KX E66.9).     Screening for diabetes mellitus (PRW40-SM Z13.1).     Screening for lipid disorders (DMB39-ET Z13.220).     SOB (shortness of breath) (IMI84-JG R06.02).     Wheezing (CAT92-QQ R06.2).     Patient Instructions:       Counseled: Patient, Regarding diagnosis, Regarding medications, Verbalized understanding, She will rtc for nexplanon removal as visit focused today on her wheezing  spent over 35 min with pt in education regarding reactive airway  start albuterol 2-3 times daily WITH SPACER, also start steroid inhaler, step down albuterol as able, AAP reviewed  Tdap today, declines COVID vaccine  Labs pending.    Orders     Orders (Selected)   Outpatient Orders  Ordered (In Transit)  Hemoglobin A1c* ([facility]): Specimen Type: Blood, Collection Date: 08/13/21 10:30:00 CDT  Lipid panel with reflex to direct ldl* ([facility]): Specimen Type: Serum, Collection Date: 08/13/21 10:30:00 CDT  TSH* ([facility]): Specimen Type: Serum, Collection  Date: 08/13/21 10:30:00 CDT  Prescriptions  Prescribed  Flovent  mcg/inh inhalation aerosol: = 2 puff(s), Inhale, bid, # 1 EA, 2 Refill(s), Type: Maintenance, Pharmacy: [facility], 2 puff(s) Inhale bid,x30 day(s), 60.75, in, 08/13/21 9:51:00 CDT, Height Measured, 226, lb, 08/13/21 9:...  Ventolin HFA 90 mcg/inh inhalation aerosol: 2 puff(s), INH, QID, PRN: for wheezing, # 17 g, 1 Refill(s), Type: Maintenance, Pharmacy: [facility], mask and aerochamber needed, 2 puff(s) Inhale qid,PRN:for wheezing, 60.75, in, 08/13/21 9...  valved holding chamber spacer: valved holding chamber spacer, See Instructions, Instructions: use with albuterol, Supply, # 1 EA, 0 Refill(s), Type: Maintenance, Pharmacy: [facility], use with albuterol, 60.75, in, 08/13/2....

## 2022-02-16 NOTE — NURSING NOTE
Comprehensive Intake Entered On:  10/21/2021 4:37 PM CDT    Performed On:  10/21/2021 4:29 PM CDT by Edith Sol               Summary   Chief Complaint :   c/o ongoing trouble breathing/wheezing, inhalers not helping. Also having trouble with diabetes/fatigue. Originally made the appt for BTB with Nexplanon but that has since subsided.    Menstrual Status :   Menarcheal   Weight Measured :   222.6 lb(Converted to: 222 lb 10 oz, 100.970 kg)    Height Measured :   60.75 in(Converted to: 5 ft 1 in, 154.30 cm)    Body Mass Index :   42.4 kg/m2 (HI)    Body Surface Area :   2.08 m2   Systolic Blood Pressure :   108 mmHg   Diastolic Blood Pressure :   60 mmHg   Mean Arterial Pressure :   76 mmHg   Peripheral Pulse Rate :   77 bpm   BP Site :   Right arm   Pulse Site :   Radial artery   BP Method :   Manual   HR Method :   Manual   Temperature Tympanic :   98.7 DegF(Converted to: 37.1 DegC)    Oxygen Saturation :   99 %   Edith Sol - 10/21/2021 4:29 PM CDT   Health Status   Allergies Verified? :   Yes   Medication History Verified? :   Yes   Immunizations Current :   Yes   Medical History Verified? :   Yes   Pre-Visit Planning Status :   Completed   Tobacco Use? :   Never smoker   Edith Sol - 10/21/2021 4:29 PM CDT   Consents   Consent for Immunization Exchange :   Consent Granted   Consent for Immunizations to Providers :   Consent Granted   Edith Sol - 10/21/2021 4:29 PM CDT   Meds / Allergies   (As Of: 10/21/2021 4:37:10 PM CDT)   Allergies (Active)   No Known Medication Allergies  Estimated Onset Date:   Unspecified ; Created By:   Laurie Mitchell CMA; Reaction Status:   Active ; Category:   Drug ; Substance:   No Known Medication Allergies ; Type:   Allergy ; Updated By:   Laurie Mitchell CMA; Reviewed Date:   10/21/2021 4:36 PM CDT        Medication List   (As Of: 10/21/2021 4:37:10 PM CDT)   Prescription/Discharge Order    metFORMIN  :   metFORMIN ; Status:   Prescribed ; Ordered As  Mnemonic:   MetFORMIN (Eqv-Glucophage XR) 500 mg oral tablet, extended release ; Simple Display Line:   2,000 mg, 4 tab(s), Oral, daily, 360 tab(s), 1 Refill(s) ; Ordering Provider:   Jackie Alvarez; Catalog Code:   metFORMIN ; Order Dt/Tm:   10/1/2021 1:51:44 PM CDT          aspirin  :   aspirin ; Status:   Prescribed ; Ordered As Mnemonic:   Aspir 81 oral delayed release tablet ; Simple Display Line:   81 mg, 1 tab(s), Oral, daily, 90 EA, 0 Refill(s) ; Ordering Provider:   Jackie Alvarez; Catalog Code:   aspirin ; Order Dt/Tm:   8/30/2021 2:37:39 PM CDT          rosuvastatin  :   rosuvastatin ; Status:   Prescribed ; Ordered As Mnemonic:   rosuvastatin 20 mg oral tablet ; Simple Display Line:   20 mg, 1 tab(s), po, hs, 90 EA, 1 Refill(s) ; Ordering Provider:   Jackie Alvarez; Catalog Code:   rosuvastatin ; Order Dt/Tm:   8/30/2021 2:35:55 PM CDT          fluticasone  :   fluticasone ; Status:   Prescribed ; Ordered As Mnemonic:   Flovent  mcg/inh inhalation aerosol ; Simple Display Line:   2 puff(s), Inhale, bid, for 30 day(s), 1 EA, 2 Refill(s) ; Ordering Provider:   Jackie Alvarez; Catalog Code:   fluticasone ; Order Dt/Tm:   8/13/2021 10:50:28 AM CDT          albuterol  :   albuterol ; Status:   Prescribed ; Ordered As Mnemonic:   Ventolin HFA 90 mcg/inh inhalation aerosol ; Simple Display Line:   2 puff(s), INH, QID, PRN: for wheezing, 17 g, 1 Refill(s) ; Ordering Provider:   Jackie Alvarez; Catalog Code:   albuterol ; Order Dt/Tm:   8/13/2021 10:16:45 AM CDT          Miscellaneous Prescription  :   Miscellaneous Prescription ; Status:   Prescribed ; Ordered As Mnemonic:   valved holding chamber spacer ; Simple Display Line:   See Instructions, use with albuterol, 1 EA, 0 Refill(s) ; Ordering Provider:   Jackie Alvarez; Catalog Code:   Miscellaneous Prescription ; Order Dt/Tm:   8/13/2021 10:16:50 AM CDT          etonogestrel  :   etonogestrel ; Status:   Prescribed ; Ordered As  Mnemonic:   Nexplanon 68 mg subcutaneous implant ; Simple Display Line:   68 mg, 1 EA, subcutaneous, once, 1 EA, 0 Refill(s) ; Ordering Provider:   Inocente Gutierrez MD; Catalog Code:   etonogestrel ; Order Dt/Tm:   11/14/2017 4:04:58 PM CST

## 2022-02-16 NOTE — PROGRESS NOTES
Patient:   AYAKA ALCANTAR            MRN: [id number]            FIN: [id number]               Age:   41 years     Sex:  Female     :  1980   Associated Diagnoses:   Diabetes mellitus, type 2; DUB (dysfunctional uterine bleeding); Asthma   Author:   Inocente Gutierrez MD      Visit Information      Date of Service: 2021 09:47 am  Performing Location: [facility]  Encounter#: [id number]      Primary Care Provider (PCP):  Inocente Gutierrez MD    NPI# [id number]      Referring Provider:  Inocente Gutierrez MD# [id number]   Visit type:  Video Visit via [employer].    Participants in room during visit:  Patient  Location of Patient: Home  Location of provider:  [facility] [place] (Clinic office or Home office)  Video Start Time:  11:25  Video End Time:   11:55        Today's visit was conducted via video conference due to the COVID-19 pandemic.  The patient's consent to proceed with a video visit has been obtained and documented.      Chief Complaint   2021 11:18 AM CST  DM and asthma follow up. Advair making no difference. Verbal consent granted for video visit.        History of Present Illness   Patient is a 41 year old F who is being evaluated via a billable video visit.    1) DUB on Nexplanon.  Did well on first Nexplanon, had replaced after 3 years then did well for a year and now is bleeding most every day with menstrual cramping.  Partner has had vasectomy.  Has tried OCP without help.    2) New DM.  Doing well from a sugar standpoint but not tolerating first dose of Ozempic.  Reassured this will get better.    3) New Onset asthma ... maybe.  Has not had PFT's.  Been going on for 2 years and not responding to treatment.  Has had normal CT.             Review of Systems   Constitutional:  Negative.    Eye:  Negative.    Ear/Nose/Mouth/Throat:  Negative.    Respiratory:  Negative.    Cardiovascular:  Negative.    Gastrointestinal:  Negative.    Genitourinary:  Negative.     Immunologic:  Negative.    Musculoskeletal:  Negative.    Integumentary:  Negative.    Neurologic:  Negative.    Psychiatric:  Negative.       Health Status   Allergies:    Allergic Reactions (Selected)  No Known Medication Allergies   Medications:  (Selected)   Prescriptions  Prescribed  Advair Diskus 250 mcg-50 mcg inhalation powder: 1 puff(s), Inhale, bid, # 60 EA, 1 Refill(s), Type: Maintenance, Pharmacy: [facility], 1 puff(s) Inhale bid, 60.75, in, 11/11/21 11:18:00 CST, Height Measured, 222.4, lb, 11/04/21 14:08:00 CD...  Aspir 81 oral delayed release tablet: = 1 tab(s) ( 81 mg ), Oral, daily, # 90 EA, 0 Refill(s), Type: Maintenance, Pharmacy: [facility], 1 tab(s) Oral daily, 60.75, in, 08/30/21 14:20:00 CDT, Height Measured, 226, lb, 08/13/21 9:5...  MetFORMIN (Eqv-Glucophage XR) 500 mg oral tablet, extended release: = 4 tab(s) ( 2,000 mg ), Oral, daily, # 360 tab(s), 1 Refill(s), Type: Maintenance, Pharmacy: [facility], 4 tab(s) Oral daily, 60.75, in, 08/30/21 14:20:00 CDT, Height Measured, 226, lb, 08/1...  Nexplanon 68 mg subcutaneous implant: 1 EA ( 68 mg ), subcutaneous, once, # 1 EA, 0 Refill(s), Type: Soft Stop, other reason (Rx)  Ozempic 2 mg/1.5 mL (0.25 mg or 0.5 mg dose) subcutaneous solution: See Instructions, Instructions: 0.25 x 2 weeks   0.50 x 3 weeks  rotate injection sites, inject once a week, # 1.5 mL, 0 Refill(s), Type: Maintenance, Pharmacy: [facility], 0.25 x 2 weeks ; 0...  Ventolin HFA 90 mcg/inh inhalation aerosol: 2 puff(s), INH, QID, PRN: for wheezing, # 17 g, 1 Refill(s), Type: Maintenance, Pharmacy: [facility], mask and aerochamber needed, 2 puff(s) Inhale qid,PRN:for wheezing, 60.75,  in, 08/13/21 9...  accu chek guide test strips: accu chek guide test strips, See Instructions, Instructions: testing 2x/ day, Supply, # 200 EA, 3 Refill(s), Type: Maintenance, Pharmacy: [facility], testing 2x/ day, 60.75, in, 10/21/21 16:2...  desogestrel-ethinyl estradiol 0.15 mg-0.03 mg oral tablet: 1 tab(s), Oral, daily, # 84 tab(s), 0 Refill(s), Type: Maintenance, Pharmacy: [facility], 1 tab(s) Oral daily, 60.75, in, 10/21/21 16:29:00 CDT, Height Measured, 222.6, lb, 10/21/21 16:29:00...  rosuvastatin 20 mg oral tablet: = 1 tab(s) ( 20 mg ), po, hs, # 90 EA, 1 Refill(s), Type: Maintenance, Pharmacy: [facility], 1 tab(s) Oral hs, 60.75, in, 08/30/21 14:20:00 CDT, Height Measured, 226, lb, 08/13/21 9:51:00 CDT...  valved holding chamber spacer: valved holding chamber spacer, See Instructions, Instructions: use with albuterol, Supply, # 1 EA, 0 Refill(s), Type: Maintenance, Pharmacy: [facility], use with albuterol, 60.75, in, 08/13/2...   Problem list:    All Problems  Mild major depression / SNOMED CT 369703008 / Confirmed  Diabetes mellitus, type 2 / SNOMED CT 016730802 / Confirmed  History of chicken pox / SNOMED CT 539102921 / Confirmed  Obese / ICD-9-.00 / Probable  Abnormal Pap Smear / ICD-9-.9 / Confirmed      Histories   Past Medical History:    Active  Abnormal Pap Smear (ICD-9-.9)  History of chicken pox (SNOMED CT 181049041)  Resolved  Pregnancy (SNOMED CT 250151979):  Resolved on 8/18/1997 at 17 years.  Pregnancy (SNOMED CT 043345214):  Resolved on 8/12/2003 at 23 years.  Pregnancy (SNOMED CT 042537016):  Resolved on 1/8/2005 at 24 years.  Pregnancy (SNOMED CT 413197941):  Resolved on 7/15/2008 at 28 years.  Pregnancy (SNOMED CT 362235663):  Resolved  on 10/6/2010 at 30 years.   Family History:    Diabetes mellitus  Father (Jose)  Asthma  Daughter (Bibi)     Procedure history:    Encounter for initial prescription of implantable subdermal contraceptive (ICD-10-CM Z30.017) performed by Jackie Alvarez on 8/30/2021 at 41 Years.  Comments:  8/30/2021 5:31 PM CDT - Nestor CRAIGDorinda  Nexplanon placed in left arm    Lot# [id number]  Exp. 09/29/2023    Due for removal in 3 years - on or before 08/30/2024  Encounter for surveillance of implantable subdermal contraceptive (ICD-10-CM Z30.46) performed by Jackie Alvarez on 8/30/2021 at 41 Years.  Comments:  8/30/2021 6:26 PM SHERRIT - Nestor CRAIGDorinda  Nexplanon removed from left arm and replaced    Originally placed 11/14/2017  LEEP (SNOMED CT 04276102) on 2/7/2000 at 19 Years.  Colposcopy.  Excision of birthmark on face.   Social History:        Electronic Cigarette/Vaping Assessment            Electronic Cigarette Use: Never.      Alcohol Assessment: Current            1 x per month, 3 drinks/episode average.      Tobacco Assessment            Never (less than 100 in lifetime)      Substance Abuse Assessment: Denies Substance Abuse            Never      Employment and Education Assessment            Human Resources      Home and Environment Assessment            Marital status: .  Spouse/Partner name: Clovis.      Nutrition and Health Assessment            Type of diet: Regular.      Exercise and Physical Activity Assessment: Regular exercise            Exercise frequency: 3-4 times/week.  Exercise type: Running, Weight lifting.      Sexual Assessment            Sexually active: Yes.  Sexual orientation: Heterosexual.      Other Assessment            Regular menses.  Menstrual duration 6 days.  Cycle interval 30 days.  History of abnormal pap smear.        Physical Examination   Measurements from flowsheet : Measurements   11/11/2021 11:18 AM CST  Height Measured - Standard                60.75 in      General:  Alert and oriented, No acute distress.    Eye:  Pupils are equal, round and reactive to light, Normal conjunctiva.    HENT:  Oral mucosa is moist.    Neck:  Supple.    Respiratory:  Respirations are non-labored.    Psychiatric:  Cooperative, Appropriate mood & affect, Normal judgment.       Impression and Plan   Diagnosis     Diabetes mellitus, type 2 (MZU46-JJ E11.9).     Course:  Improving, Progressing as expected.    Orders     Orders (Selected)   Prescriptions  Prescribed  MetFORMIN (Eqv-Glucophage XR) 500 mg oral tablet, extended release: = 4 tab(s) ( 2,000 mg ), Oral, daily, # 360 tab(s), 1 Refill(s), Type: Maintenance, Pharmacy: [facility], 4 tab(s) Oral daily, 60.75, in, 08/30/21 14:20:00 CDT, Height Measured, 226, lb, 08/1...  Ozempic 2 mg/1.5 mL (0.25 mg or 0.5 mg dose) subcutaneous solution: See Instructions, Instructions: 0.25 x 2 weeks   0.50 x 3 weeks  rotate injection sites, inject once a week, # 1.5 mL, 0 Refill(s), Type: Maintenance, Pharmacy: [facility], 0.25 x 2 weeks ; 0....     Diagnosis     DUB (dysfunctional uterine bleeding) (OVB81-IE N93.8).     Course:  Improving: none.    Orders     Orders (Selected)   Outpatient Orders  Ordered  Referral (Request): 11/11/21 11:38:00 CST, Referred to: Obstetrics & Gynecology, Referred to: Dr. Cortez, Reason for referral: Bleeding on Nexplanon after 4 years (replaced after 3 years).  Possible US.  Failed OCP trial to control., DUB (dysfunctional uterine bleeding)  Prescriptions  Prescribed  desogestrel-ethinyl estradiol 0.15 mg-0.03 mg oral tablet: 1 tab(s), Oral, daily, # 84 tab(s), 0 Refill(s), Type: Maintenance, Pharmacy: [facility], 1 tab(s) Oral daily, 60.75, in, 10/21/21 16:29:00 CDT, Height Measured, 222.6, lb, 10/21/21 16:29:00....     Diagnosis     Asthma  (BKO96-NT J45.909).     Course:  Improving: none, Not progressing as expected.    Orders     Orders (Selected)   Outpatient Orders  Ordered  Referral (Request): 11/11/21 11:44:00 CST, Referred to: Pulmonology, Reason for referral: Does she have true asthma?, Asthma  Prescriptions  Prescribed  Advair Diskus 250 mcg-50 mcg inhalation powder: 1 puff(s), Inhale, bid, # 60 EA, 1 Refill(s), Type: Maintenance, Pharmacy: [facility], 1 puff(s) Inhale bid, 60.75, in, 11/11/21 11:18:00 CST, Height Measured, 222.4, lb, 11/04/21 14:08:00 CD....        Review / Management   Results review:  Lab results   9/30/2021 8:03 AM CDT Sodium Level 138 mmol/L    Potassium Level 4.3 mmol/L    Chloride Level 108 mmol/L    CO2 Level 22 mmol/L    Glucose Level 211 mg/dL  HI    BUN 16 mg/dL    Creatinine 0.84 mg/dL    BUN/Creat Ratio NOT APPLICABLE    eGFR 85 mL/min/1.73m2    eGFR African American 99 mL/min/1.73m2    Calcium Level 9.2 mg/dL    Hgb A1c 7.3  HI    Cholesterol 184 mg/dL    Non-  HI    HDL 48 mg/dL  LOW    Chol/HDL Ratio 3.8      HI    Triglyceride 95 mg/dL    U Creatinine 109 mg/dL    U Microalbumin 0.4 mg/dL    Ur Microalb/Creat Ratio 4    WBC 5.8    RBC 4.71    Hgb 14.3 gm/dL    Hct 43.0 %    MCV 91.3 fL    MCH 30.4 pg    MCHC 33.3 gm/dL    RDW 12.2 %    Platelet 323    MPV 9.8 fL   8/30/2021 2:55 PM CDT D-Dimer 0.22 mcg/mL FEU   8/24/2021 8:05 AM CDT Sodium Level 138 mmol/L    Potassium Level 4.5 mmol/L    Chloride Level 104 mmol/L    CO2 Level 25 mmol/L    Glucose Level 180 mg/dL  HI    BUN 13 mg/dL    Creatinine 0.73 mg/dL    BUN/Creat Ratio NOT APPLICABLE    eGFR 101 mL/min/1.73m2    eGFR African American 117 mL/min/1.73m2    Calcium Level 9.4 mg/dL    Hgb A1c 7.4  HI    Cholesterol 201 mg/dL  HI    Non-  HI    HDL 50 mg/dL    Chol/HDL Ratio 4.0      HI    Triglyceride 167 mg/dL  HI    U Creatinine 87 mg/dL    U Microalbumin 0.3 mg/dL     Ur Microalb/Creat Ratio 3    WBC 7.2    RBC 4.68    Hgb 13.9 gm/dL    Hct 42.3 %    MCV 90.4 fL    MCH 29.7 pg    MCHC 32.9 gm/dL    RDW 12.4 %    Platelet 330    MPV 9.6 fL   8/13/2021 10:45 AM CDT Hgb A1c 7.7  HI    Cholesterol 232 mg/dL  HI    Non-  HI    HDL 49 mg/dL  LOW    Chol/HDL Ratio 4.7      HI    Triglyceride 205 mg/dL  HI    TSH 1.06 mIU/L   .

## 2022-02-16 NOTE — NURSING NOTE
Comprehensive Intake Entered On:  8/13/2021 9:55 AM CDT    Performed On:  8/13/2021 9:51 AM CDT by Dorinda Baez LPN               Summary   Chief Complaint :   scheduled for Nexplanon removal and replacement but would really like to have a px, not sure if she really needs to have the Nexplanon replaced at this time, it is past due for removal   Menstrual Status :   Menarcheal   Weight Measured :   226.0 lb(Converted to: 226 lb 0 oz, 102.512 kg)    Height Measured :   60.75 in(Converted to: 5 ft 1 in, 154.30 cm)    Body Mass Index :   43.05 kg/m2 (HI)    Body Surface Area :   2.09 m2   Systolic Blood Pressure :   116 mmHg   Diastolic Blood Pressure :   82 mmHg (HI)    Mean Arterial Pressure :   93 mmHg   Peripheral Pulse Rate :   76 bpm   BP Site :   Right arm   BP Method :   Manual   Temperature Tympanic :   98.0 DegF(Converted to: 36.7 DegC)    Oxygen Saturation :   98 %   Dorinda Baez LPN - 8/13/2021 9:51 AM CDT   Health Status   Allergies Verified? :   Yes   Medication History Verified? :   Yes   Immunizations Current :   Yes   Medical History Verified? :   No   Pre-Visit Planning Status :   Completed   Tobacco Use? :   Never smoker   Dorinda Baez LPN - 8/13/2021 9:51 AM CDT   Meds / Allergies   (As Of: 8/13/2021 9:55:27 AM CDT)   Allergies (Active)   No Known Medication Allergies  Estimated Onset Date:   Unspecified ; Created By:   Laurie Mitchell CMA; Reaction Status:   Active ; Category:   Drug ; Substance:   No Known Medication Allergies ; Type:   Allergy ; Updated By:   Laurie Mitchell CMA; Reviewed Date:   7/18/2018 4:37 PM CDT        Medication List   (As Of: 8/13/2021 9:55:27 AM CDT)   Prescription/Discharge Order    etonogestrel  :   etonogestrel ; Status:   Prescribed ; Ordered As Mnemonic:   Nexplanon 68 mg subcutaneous implant ; Simple Display Line:   68 mg, 1 EA, subcutaneous, once, 1 EA, 0 Refill(s) ; Ordering Provider:   Inocente Gutierrez MD; Catalog Code:   etonogestrel ; Order  Dt/Tm:   11/14/2017 4:04:58 PM CST          sertraline  :   sertraline ; Status:   Prescribed ; Ordered As Mnemonic:   sertraline 50 mg oral tablet ; Simple Display Line:   50 mg, 1 tab(s), PO, Daily, 1/2 tablet daily for 1 week then 1 tab daily ongoing, 30 tab(s), 1 Refill(s) ; Ordering Provider:   Inocente Gutierrez MD; Catalog Code:   sertraline ; Order Dt/Tm:   2/4/2019 2:41:10 PM CST            Social History   Social History   (As Of: 8/13/2021 9:55:27 AM CDT)   Alcohol:  Current      1 x per month, 3 drinks/episode average.   (Last Updated: 5/13/2015 8:05:02 AM CDT by Nicole Vásquez LPN)          Tobacco:        Never (less than 100 in lifetime)   (Last Updated: 8/13/2021 9:52:53 AM CDT by Dorinda Baez LPN)          Electronic Cigarette/Vaping:        Electronic Cigarette Use: Never.   (Last Updated: 8/13/2021 9:52:56 AM CDT by Dorinda Baez LPN)          Substance Abuse:  Denies Substance Abuse      Never   (Last Updated: 4/10/2012 7:51:21 AM CDT by Ofelia Miller)          Employment/School:        Human Resources   (Last Updated: 11/1/2017 12:20:17 PM CDT by Samira Sanchez)          Home/Environment:        Marital status: .  Spouse/Partner name: Clovis.   (Last Updated: 4/10/2012 7:47:32 AM CDT by Ofelia Miller)          Nutrition/Health:        Type of diet: Regular.   (Last Updated: 4/10/2012 7:51:14 AM CDT by Ofelia Miller)          Exercise:  Regular exercise      Exercise frequency: 3-4 times/week.  Exercise type: Running, Weight lifting.   (Last Updated: 4/10/2012 7:49:16 AM CDT by Ofelia Miller)          Sexual:        Sexually active: Yes.  Sexual orientation: Heterosexual.   (Last Updated: 4/10/2012 7:49:57 AM CDT by Ofelia Miller)          Other:        Regular menses.  Menstrual duration 6 days.  Cycle interval 30 days.  History of abnormal pap smear.   (Last Updated: 4/10/2012 7:48:17 AM CDT by Ofelia Miller)

## 2022-02-16 NOTE — TELEPHONE ENCOUNTER
---------------------  From: Jackie Alvarez   To: AYAKA ALCANTAR    Sent: 9/8/2021 10:46:48 AM CDT  Subject: General Message     Hi Kojo,  Your CT scan of the lungs is normal, shows no blood clot and does not explain any shortness of breath. If symptoms continue, please check back with me, thank you.    DAREN Villalobos

## 2022-02-16 NOTE — NURSING NOTE
Quick Intake Entered On:  11/4/2021 2:08 PM CDT    Performed On:  11/4/2021 2:08 PM CDT by Daniela Adhikari               Summary   Menstrual Status :   Menarcheal   Weight Measured :   222.4 lb(Converted to: 222 lb 6 oz, 100.879 kg)    Height Measured :   60.75 in(Converted to: 5 ft 1 in, 154.30 cm)    Body Mass Index :   42.36 kg/m2 (HI)    Body Surface Area :   2.08 m2   Daniela Adhikari - 11/4/2021 2:08 PM CDT

## 2022-02-16 NOTE — PROGRESS NOTES
Patient:   AYAKA ALCANTAR            MRN: [id number]            FIN: [id number]               Age:   37 years     Sex:  Female     :  1980   Associated Diagnoses:   Acute cystitis   Author:   Russell Barrow PA-C      Visit Information      Date of Service: 2018 04:00 pm  Performing Location: [facility]  Encounter#: [id number]      Primary Care Provider (PCP):  Inocente Gutierrez MD    NPI# [id number]   Visit type:  New symptom.    Accompanied by:  No one.    Source of history:  Self.    Referral source:  Self.    History limitation:  None.       Chief Complaint   1/3/2018 4:36 PM CST     urinary pressure and frequency        History of Present Illness             The patient presents with dysuria.  The dysuria is characterized by burning and a sensation of bladder fullness.  The severity of the dysuria is mild.  The dysuria has lasted for 1 day(s).  Associated symptoms consist of none.        Review of Systems   Constitutional:  Negative except as documented in history of present illness.    Gastrointestinal:  Negative.    Genitourinary:  Negative except as documented in history of present illness.    Gynecologic:  Negative.              Health Status   Allergies:    Allergic Reactions (All)  No Known Medication Allergies  Canceled/Inactive Reactions (All)  No known allergies   Medications:  (Selected)   Prescriptions  Prescribed  Nexplanon 68 mg subcutaneous implant: 1 EA ( 68 mg ), subcutaneous, once, # 1 EA, 0 Refill(s), Type: Soft Stop, other reason (Rx)  Septra  mg-160 mg oral tablet: 1 tab(s), po, bid, # 10 tab(s), 0 Refill(s), Type: Maintenance, Pharmacy: [id number], 1 tab(s) po bid,x5 day(s)  sertraline 50 mg oral tablet: 2 tab(s) ( 100 mg ), PO, Daily, # 180 tab(s), 3 Refill(s), Type: Maintenance, Pharmacy: [id number], 2 tab(s) po daily,x90 day(s)   Problem list:    All Problems  Abnormal Pap Smear / ICD-9-.9 / Confirmed  Diabetes Mellitus,  Gestational / ICD-9-.80 / Confirmed  History of chicken pox / SNOMED CT 774781703 / Confirmed  Mild major depression / SNOMED CT 229278903 / Confirmed  Obese / ICD-9-.00 / Probable  Resolved: Pregnancy / SNOMED CT 774184311  Resolved: Pregnancy / SNOMED CT 998075218  Resolved: Pregnancy / SNOMED CT 931286270  Resolved: Pregnancy / SNOMED CT 926026201  Resolved: Pregnancy / SNOMED CT 026120443      Histories   Past Medical History:    Active  Abnormal Pap Smear (796.9)  Diabetes Mellitus, Gestational (648.80)  History of chicken pox (962021192)  Resolved  Pregnancy (418628868):  Resolved on 8/18/1997 at 17 years.  Pregnancy (415650047):  Resolved on 8/12/2003 at 23 years.  Pregnancy (047271498):  Resolved on 1/8/2005 at 24 years.  Pregnancy (027597335):  Resolved on 7/15/2008 at 28 years.  Pregnancy (614609822):  Resolved on 10/6/2010 at 30 years.   Family History:    Diabetes mellitus  Father (Jose)  Asthma  Daughter (Bibi)     Procedure history:    LEEP (22513028) on 2/7/2000 at 19 Years.  Colposcopy.  Excision of birthmark on face.   Social History:        Alcohol Assessment: Current            1 x per month, 3 drinks/episode average.      Tobacco Assessment: Denies Tobacco Use            Never      Substance Abuse Assessment: Denies Substance Abuse            Never      Employment and Education Assessment            Human Resources      Home and Environment Assessment            Marital status: .  Spouse/Partner name: Clovis.      Nutrition and Health Assessment            Type of diet: Regular.      Exercise and Physical Activity Assessment: Regular exercise            Exercise frequency: 3-4 times/week.  Exercise type: Running, Weight lifting.      Sexual Assessment            Sexually active: Yes.  Sexual orientation: Heterosexual.      Other Assessment            Regular menses.  Menstrual duration 6 days.  Cycle interval 30 days.  History of abnormal pap smear.        Physical Examination    Vital Signs   1/3/2018 4:36 PM CST Temperature Tympanic 98.5 DegF    Peripheral Pulse Rate 80 bpm    Pulse Site Radial artery    HR Method Manual    Systolic Blood Pressure 104 mmHg    Diastolic Blood Pressure 76 mmHg    Mean Arterial Pressure 85 mmHg    BP Site Left arm    BP Method Manual      Measurements from flowsheet : Measurements   1/3/2018 4:36 PM CST     Height Measured - Standard                60.75 in     Point of care testing:       Urine dipstick: UA pos..    General:  Alert and oriented, No acute distress.    Respiratory:  Respirations are non-labored.    Cardiovascular:  Normal rate.    Gastrointestinal:  Soft, Non-tender, Non-distended.    Genitourinary:  No costovertebral angle tenderness.       Review / Management   Results review:  Lab results   1/3/2018 4:47 PM CST Urine Color Urine Dipstick Yellow    Urine Appearance Urine Dipstick Slightly Cloudy    pH Urine Dipstick 7    Specific Gravity Urine Dipstick 1.020    Glucose Urine Dipstick Negative    Bilirubin Urine Dipstick Negative    Ketones Urine Dipstick Negative    Blood Urine Dipstick Moderate    Protein Urine Dipstick 100 mg/dl    Nitrite Urine Dipstick Negative    Leukocyte Esterase Urine Dipstick Moderate    Urobilinogen Urine Dipstick 0.2 mg/dl    POC Test Comments POC Test Comments   11/14/2017 3:56 PM CST hCG Ql POC Negative    POC Test Comments POC Test Comments   .       Impression and Plan   Diagnosis     Acute cystitis (GKF50-XN N30.01).     Patient Instructions:       Counseled: Patient, Regarding diagnosis, Regarding treatment, Regarding medications, Regarding activity, Verbalized understanding.    Orders     Orders (Selected)   Outpatient Orders  Ordered  Return to Clinic (Request): RFV: Annual px, Return in 1 year  Return to Clinic (Request): RFV: Pap smear due at annual px, RTC Date 11/01/22  Prescriptions  Prescribed  Nexplanon 68 mg subcutaneous implant: 1 EA ( 68 mg ), subcutaneous, once, # 1 EA, 0 Refill(s), Type: Soft  Stop, other reason (Rx)  Septra  mg-160 mg oral tablet: 1 tab(s), po, bid, # 10 tab(s), 0 Refill(s), Type: Maintenance, Pharmacy: [id number], 1 tab(s) po bid,x5 day(s)  sertraline 50 mg oral tablet: 2 tab(s) ( 100 mg ), PO, Daily, # 180 tab(s), 3 Refill(s), Type: Maintenance, Pharmacy: [facility] #[id number], 2 tab(s) po daily,x90 day(s).     RTC in 24-36 hours if not better.

## 2022-02-16 NOTE — PROGRESS NOTES
Patient:   AYAKA ALCANTAR            MRN: [id number]            FIN: [id number]               Age:   41 years     Sex:  Female     :  1980   Associated Diagnoses:   Contraceptive management; Diabetes mellitus, type 2; Encounter for pregnancy test; SOB (shortness of breath)   Author:   Jackie Alvarez      Visit Information      Date of Service: 2021 01:53 pm  Performing Location: [facility]  Encounter#: [id number]      Primary Care Provider (PCP):  Inocente Gutierrez MD    NPI# [id number]      Referring Provider:  Jackie Alvarez    NPI# [id number]      Chief Complaint   2021 2:20 PM CDT    1) Nexplanon removal 2) repeat chest xray      History of Present Illness   here for a number of things  1.  recent dx of DM. She did have diabetes with pregnancy and never took the medication afterwards as it gave her diarrhea. Is willing to treat now. On no meds yet for this    2.   CXR for increasing SOB over 8 months was indeterminate since I last saw her. She has started albuterol and flovent and feels a bit better but still gets so SOB and wheezes. Never smoker    3.  She would like to exchange her Nexplanon. Her partner has vasectomy, Kojo has no periods and likes the NExplanon, it has been in nearly 4 years. Will do today      Health Status   Allergies:    Allergic Reactions (Selected)  No Known Medication Allergies   Medications:  (Selected)   Prescriptions  Prescribed  Aspir 81 oral delayed release tablet: = 1 tab(s) ( 81 mg ), Oral, daily, # 90 EA, 0 Refill(s), Type: Maintenance, Pharmacy: [facility], 1 tab(s) Oral daily, 60.75, in, 21 14:20:00 CDT, Height Measured, 226, lb, 21 9:5...  Flovent  mcg/inh inhalation aerosol: = 2 puff(s), Inhale, bid, # 1 EA, 2 Refill(s), Type: Maintenance, Pharmacy: [facility], 2 puff(s) Inhale bid,x30  day(s), 60.75, in, 08/13/21 9:51:00 CDT, Height Measured, 226, lb, 08/13/21 9:...  Nexplanon 68 mg subcutaneous implant: 1 EA ( 68 mg ), subcutaneous, once, # 1 EA, 0 Refill(s), Type: Soft Stop, other reason (Rx)  Ventolin HFA 90 mcg/inh inhalation aerosol: 2 puff(s), INH, QID, PRN: for wheezing, # 17 g, 1 Refill(s), Type: Maintenance, Pharmacy: [facility], mask and aerochamber needed, 2 puff(s) Inhale qid,PRN:for wheezing, 60.75, in, 08/13/21 9...  metFORMIN 500 mg oral tablet, extended release: See Instructions, Instructions: 1 tab(s) PO with evening meal x1 week, then increase to 2 tabs x1 week, then 3 tabs x1week then 4 tabs at evenin meal., # 70 tab(s), 0 Refill(s), Type: Maintenance, Pharmacy: [facility]...  rosuvastatin 20 mg oral tablet: = 1 tab(s) ( 20 mg ), po, hs, # 90 EA, 1 Refill(s), Type: Maintenance, Pharmacy: [facility], 1 tab(s) Oral hs, 60.75, in, 08/30/21 14:20:00 CDT, Height Measured, 226, lb, 08/13/21 9:51:00 CDT...  valved holding chamber spacer: valved holding chamber spacer, See Instructions, Instructions: use with albuterol, Supply, # 1 EA, 0 Refill(s), Type: Maintenance, Pharmacy: [facility], use with albuterol, 60.75, in, 08/13/2...,    Medications          *denotes recorded medication          valved holding chamber spacer: See Instructions, use with albuterol, 1 EA, 0 Refill(s).          Ventolin HFA 90 mcg/inh inhalation aerosol: 2 puff(s), INH, QID, PRN: for wheezing, 17 g, 1 Refill(s).          Aspir 81 oral delayed release tablet: 81 mg, 1 tab(s), Oral, daily, 90 EA, 0 Refill(s).          Nexplanon 68 mg subcutaneous implant: 68 mg, 1 EA, subcutaneous, once, 1 EA, 0 Refill(s).          Flovent  mcg/inh inhalation aerosol: 2 puff(s), Inhale, bid, for 30 day(s), 1 EA, 2  Refill(s).          metFORMIN 500 mg oral tablet, extended release: See Instructions, 1 tab(s) PO with evening meal x1 week, then increase to 2 tabs x1 week, then 3 tabs x1week then 4 tabs at evenin meal., 70 tab(s), 0 Refill(s).          rosuvastatin 20 mg oral tablet: 20 mg, 1 tab(s), po, hs, 90 EA, 1 Refill(s).       Problem list:    All Problems  Diabetes mellitus, type 2 / SNOMED CT 507137359 / Confirmed  Obese / ICD-9-.00 / Probable  Mild major depression / SNOMED CT 628208220 / Confirmed  History of chicken pox / SNOMED CT 029708193 / Confirmed  Abnormal Pap Smear / ICD-9-.9 / Confirmed  Resolved: Pregnancy / SNOMED CT 019854596  Resolved: Pregnancy / SNOMED CT 553476811  Resolved: Pregnancy / SNOMED CT 405915700  Resolved: Pregnancy / SNOMED CT 068450478  Resolved: Pregnancy / SNOMED CT 207731666  Canceled: Diabetes Mellitus, Gestational / ICD-9-.80      Histories   Past Medical History:    Active  Abnormal Pap Smear (796.9)  History of chicken pox (011798948)  Resolved  Pregnancy (538922430):  Resolved on 8/18/1997 at 17 years.  Pregnancy (941668456):  Resolved on 8/12/2003 at 23 years.  Pregnancy (766789061):  Resolved on 1/8/2005 at 24 years.  Pregnancy (607519554):  Resolved on 7/15/2008 at 28 years.  Pregnancy (520267385):  Resolved on 10/6/2010 at 30 years.   Family History:    Diabetes mellitus  Father (Jose)  Asthma  Daughter (Bibi)     Procedure history:    LEEP (SNOMED CT 96610609) on 2/7/2000 at 19 Years.  Colposcopy.  Excision of birthmark on face.   Social History:        Electronic Cigarette/Vaping Assessment            Electronic Cigarette Use: Never.      Alcohol Assessment: Current            1 x per month, 3 drinks/episode average.      Tobacco Assessment            Never (less than 100 in lifetime)      Substance Abuse Assessment: Denies Substance Abuse            Never      Employment and Education Assessment            Human Resources      Home and Environment  Assessment            Marital status: .  Spouse/Partner name: Clovis.      Nutrition and Health Assessment            Type of diet: Regular.      Exercise and Physical Activity Assessment: Regular exercise            Exercise frequency: 3-4 times/week.  Exercise type: Running, Weight lifting.      Sexual Assessment            Sexually active: Yes.  Sexual orientation: Heterosexual.      Other Assessment            Regular menses.  Menstrual duration 6 days.  Cycle interval 30 days.  History of abnormal pap smear.        Physical Examination   General:  Alert and oriented.       Impression and Plan   Diagnosis     Contraceptive management (GNN08-YG Z30.9).     Diabetes mellitus, type 2 (SEM31-JP E11.9).     Encounter for pregnancy test (VNK21-ZD Z32.00).     SOB (shortness of breath) (OGU53-MP R06.02).     Plan:  over 42 min in counseling today  re SOB--will get d dimer and CT scan , doubt blood clot but her  her lack of sig improvement with inhalersj warrants CT scan.    re DM--treatment, expectations, needs for vision/hearing/RD consult. Start meds in progress oreder with metformin and baby asa then 2 weeks later start statin    See note for Nexplanon removal.    Patient Instructions:       Counseled: Patient, Regarding diagnosis, Regarding treatment, Regarding medications, Verbalized understanding.    Orders     Orders (Selected)   Outpatient Orders  Ordered (Collected)  D-Dimer, Quantitative* ([facility]): Specimen Type: Plasma, Collection Date: 08/30/21 14:47:00 CDT  Prescriptions  Prescribed  Aspir 81 oral delayed release tablet: = 1 tab(s) ( 81 mg ), Oral, daily, # 90 EA, 0 Refill(s), Type: Maintenance, Pharmacy: [facility] [place], 1 tab(s) Oral daily, 60.75, in, 08/30/21 14:20:00 CDT, Height Measured, 226, lb, 08/13/21 9:5...  metFORMIN 500 mg oral tablet, extended release: See Instructions, Instructions: 1 tab(s) PO with evening meal x1 week, then increase  to 2 tabs x1 week, then 3 tabs x1week then 4 tabs at evenin meal., # 70 tab(s), 0 Refill(s), Type: Maintenance, Pharmacy: [facility]...  rosuvastatin 20 mg oral tablet: = 1 tab(s) ( 20 mg ), po, hs, # 90 EA, 1 Refill(s), Type: Maintenance, Pharmacy: [facility] [place], 1 tab(s) Oral hs, 60.75, in, 08/30/21 14:20:00 CDT, Height Measured, 226, lb, 08/13/21 9:51:00 CDT....

## 2022-02-16 NOTE — PROCEDURES
Accession Number:       [id number]  CLINICAL INFORMATION::     None given  LMP::     592575  PREV. PAP::     4/6/12  PREV. BX::     N/A  SOURCE::     None given  STATEMENT OF ADEQUACY::     Satisfactory for evaluation. Endocervical/transformation zone component present.  INTERPRETATION/RESULT::     Negative for intraepithelial lesion or malignancy.  COMMENT::     This Pap test has been evaluated with computer assisted technology.  CYTOTECHNOLOGIST::     KEISHA ROSADO(ASCP) CT Screening location: [place] [address]  HPV mRNA E6/E7:     Not Detected       This test was performed using the APTIMA HPV Assay ([employer]       This assay detects E6/E7 viral messenger RNA (mRNA) from 14       high-risk HPV types (16,18,31,33,35,39,45,51,52,56,58,59,66,68).

## 2022-02-16 NOTE — TELEPHONE ENCOUNTER
---------------------  From: Jennifer Henderson LPN (Phone Messages Pool ([id number]_[facility]))   To: Daniela Adhikari;     Sent: 12/8/2021 10:44:54 AM CST  Subject: CONSUMER MESSAGE FW: Daniela Adhikari           ---------------------  From: AYAKA ALCANTAR  To: [facility]  Sent: 12/08/2021 10:39 a.m. CST  Subject: Daniela Adhikari  Good Morning Chey,  My Ozempic ran out last night.  I was supposed to take .50 last night and didn t have enough to even take .25.  I maybe used too much to prep the pen.  Can you call in another one for me?  I believe that next week I am supposed to start taking the full dose of .100.  Thanks,  Kojo Alcantar---------------------  From: Daniela Adhikari   To: Phone Messages Pool ([id number]_[place] [facility] [place]);     Sent: 12/8/2021 10:55:25 AM CST  Subject: RE: CONSUMER MESSAGE FW: Daniela Varma,   I hope you are doing great!  Happy Holidays.      I sent in the new order for the 1.0 mg Ozempic pen.  Remember that you only prime the first time you use a new pen, not every week. If you primed it every time you will not be able to get your full doses.      Thank you,   Let me know if you have any questions,   Chey Adhikari---------------------  From: Vincent LAM, [place] (Phone Messages Pool ([id number]_[facility]))   To: AYAKA ALCANTAR    Sent: 12/8/2021 11:01:54 AM CST  Subject: FW: CONSUMER MESSAGE FW: Daniela Adhikari

## 2022-02-16 NOTE — TELEPHONE ENCOUNTER
---------------------  From: Jackie Alvarez   To: AYAKA ALCANTAR    Sent: 8/25/2021 7:41:44 AM CDT  Subject: General Message     Thanks for repeating some blood work.  This confirms diabetes. I will see you on the 30th and we will discuss results and make a plan!    Thank you!    DAREN Villalobos      Results:  Date Result Name Ind Value Ref Range   8/24/2021 8:05 AM Sodium Level  138 mmol/L (135 - 146)   8/24/2021 8:05 AM Potassium Level  4.5 mmol/L (3.5 - 5.3)   8/24/2021 8:05 AM Chloride Level  104 mmol/L (98 - 110)   8/24/2021 8:05 AM CO2 Level  25 mmol/L (20 - 32)   8/24/2021 8:05 AM Glucose Level ((H)) 180 mg/dL (65 - 99)   8/24/2021 8:05 AM BUN  13 mg/dL (7 - 25)   8/24/2021 8:05 AM Creatinine Level  0.73 mg/dL (0.50 - 1.10)   8/24/2021 8:05 AM BUN/Creat Ratio  NOT APPLICABLE (6 - 22)   8/24/2021 8:05 AM eGFR  101 mL/min/1.73m2 (> OR = 60 - )   8/24/2021 8:05 AM eGFR African American  117 mL/min/1.73m2 (> OR = 60 - )   8/24/2021 8:05 AM Calcium Level  9.4 mg/dL (8.6 - 10.2)   8/24/2021 8:05 AM Hgb A1c ((H)) 7.4 ( - <5.7)   8/24/2021 8:05 AM Cholesterol ((H)) 201 mg/dL ( - <200)   8/24/2021 8:05 AM Non-HDL Cholesterol ((H)) 151 ( - <130)   8/24/2021 8:05 AM HDL  50 mg/dL (> OR = 50 - )   8/24/2021 8:05 AM Cholesterol/HDL Ratio  4.0 ( - <5.0)   8/24/2021 8:05 AM LDL ((H)) 123    8/24/2021 8:05 AM Triglyceride ((H)) 167 mg/dL ( - <150)   8/24/2021 8:05 AM U Creatinine  87 mg/dL (20 - 275)   8/24/2021 8:05 AM U Microalbumin  0.3 mg/dL (See Note: - )   8/24/2021 8:05 AM Ur Microalbumin/Creatinine Ratio  3 ( - <30)   8/24/2021 8:05 AM WBC  7.2 (3.8 - 10.8)   8/24/2021 8:05 AM RBC  4.68 (3.80 - 5.10)   8/24/2021 8:05 AM Hgb  13.9 gm/dL (11.7 - 15.5)   8/24/2021 8:05 AM Hct  42.3 % (35.0 - 45.0)   8/24/2021 8:05 AM MCV  90.4 fL (80.0 - 100.0)   8/24/2021 8:05 AM MCH  29.7 pg (27.0 - 33.0)   8/24/2021 8:05 AM MCHC  32.9 gm/dL (32.0 - 36.0)   8/24/2021 8:05 AM RDW  12.4 % (11.0 - 15.0)   8/24/2021 8:05 AM  Platelet  330 (140 - 400)   8/24/2021 8:05 AM MPV  9.6 fL (7.5 - 12.5)

## 2022-02-16 NOTE — NURSING NOTE
Comprehensive Intake Entered On:  8/30/2021 2:23 PM CDT    Performed On:  8/30/2021 2:20 PM CDT by Dorinda Baez LPN               Summary   Chief Complaint :   1) Nexplanon removal 2) repeat chest xray   Menstrual Status :   Menarcheal   Height Measured :   60.75 in(Converted to: 5 ft 1 in, 154.30 cm)    Ht/Wt Measurement Refused by Patient? :   Yes   Systolic Blood Pressure :   128 mmHg   Diastolic Blood Pressure :   76 mmHg   Mean Arterial Pressure :   93 mmHg   BP Site :   Right arm   BP Method :   Manual   Temperature Tympanic :   98.1 DegF(Converted to: 36.7 DegC)    Oxygen Saturation :   98 %   Dorinda Baez LPN - 8/30/2021 2:20 PM CDT   Health Status   Allergies Verified? :   Yes   Medication History Verified? :   Yes   Immunizations Current :   Yes   Medical History Verified? :   Yes   Pre-Visit Planning Status :   Completed   Tobacco Use? :   Never smoker   Dorinda Baez LPN - 8/30/2021 2:20 PM CDT   Meds / Allergies   (As Of: 8/30/2021 2:23:05 PM CDT)   Allergies (Active)   No Known Medication Allergies  Estimated Onset Date:   Unspecified ; Created By:   Laurie Mitchell CMA; Reaction Status:   Active ; Category:   Drug ; Substance:   No Known Medication Allergies ; Type:   Allergy ; Updated By:   Laurie Mitchell CMA; Reviewed Date:   7/18/2018 4:37 PM CDT        Medication List   (As Of: 8/30/2021 2:23:05 PM CDT)   Prescription/Discharge Order    Miscellaneous Prescription  :   Miscellaneous Prescription ; Status:   Prescribed ; Ordered As Mnemonic:   valved holding chamber spacer ; Simple Display Line:   See Instructions, use with albuterol, 1 EA, 0 Refill(s) ; Ordering Provider:   Jackie Alvarez; Catalog Code:   Miscellaneous Prescription ; Order Dt/Tm:   8/13/2021 10:16:50 AM CDT          albuterol  :   albuterol ; Status:   Prescribed ; Ordered As Mnemonic:   Ventolin HFA 90 mcg/inh inhalation aerosol ; Simple Display Line:   2 puff(s), INH, QID, PRN: for wheezing, 17 g, 1 Refill(s) ;  Ordering Provider:   Jackie Alvarez; Catalog Code:   albuterol ; Order Dt/Tm:   8/13/2021 10:16:45 AM CDT          etonogestrel  :   etonogestrel ; Status:   Prescribed ; Ordered As Mnemonic:   Nexplanon 68 mg subcutaneous implant ; Simple Display Line:   68 mg, 1 EA, subcutaneous, once, 1 EA, 0 Refill(s) ; Ordering Provider:   Matt ANNA [place]; Catalog Code:   etonogestrel ; Order Dt/Tm:   11/14/2017 4:04:58 PM CST          fluticasone  :   fluticasone ; Status:   Prescribed ; Ordered As Mnemonic:   Flovent  mcg/inh inhalation aerosol ; Simple Display Line:   2 puff(s), Inhale, bid, for 30 day(s), 1 EA, 2 Refill(s) ; Ordering Provider:   Jackie Alvarez; Catalog Code:   fluticasone ; Order Dt/Tm:   8/13/2021 10:50:28 AM CDT

## 2022-02-16 NOTE — TELEPHONE ENCOUNTER
---------------------  From: iJllian Amaya RN (Phone Messages Pool ([id number]))   To: T Message Pool ([id number]);     Sent: 10/26/2021 2:00:23 PM CDT  Subject: Consumer message: Dr. Gutierrez           ---------------------  From: AYAKA ALCANTAR  To: [facility]  Sent: 10/26/2021 01:51 p.m. CDT  Subject: Dr. Gutierrez  I started bleeding again yesterday and I am still bleeding today.  It literally only stopped for less  than a week.  What do you suggest?  Kojo Rucker---------------------  From: Cherelle Carnes CMA (T Message Pool ([id number]))   To: Inocente Gutierrez MD;     Sent: 10/26/2021 2:26:54 PM CDT  Subject: FW: Consumer message: Dr. Gutierrez---------------------  From: Inocetne Gutierrez MD   To: T Message Pool ([id number]); AYAKA ALCANTAR    Sent: 10/26/2021 2:47:37 PM CDT  Subject: RE: Consumer message: Dr. Matt Varma,    If you are still bleeding by the end of the week I would suggest using a birth control pill for a month or two to try and regulate your cyscle better.    Jian Gutierrez MD

## 2022-02-16 NOTE — PROGRESS NOTES
Patient:   AYAKA ALCANTAR            MRN: [id number]            FIN: [id number]               Age:   37 years     Sex:  Female     :  1980   Associated Diagnoses:   Nexplanon insertion   Author:   Inocente Gutierrez MD      Subjective   Chief complaint     2017 3:45 PM CST nexplanon   2017 4:29 PM CST Patient here for nexplanon insertion.    Patient presents for insertion of Nexplanon.  Negative pregnancy test  reviewed.  Risks, benefits and alternatives to implantable contraceptives were discussed.  Risks discussed included:  headache, nausea, changes in menstrual pattern, insertion site pain, nerve damage, bleeding, device falling out or migration of device with potential need for surgical removal.  Patient consented to procedure.     .        Health Status   Allergies:    Allergic Reactions (Selected)  No Known Medication Allergies   Medications:  (Selected)   Prescriptions  Prescribed  sertraline 50 mg oral tablet: 2 tab(s) ( 100 mg ), PO, Daily, # 180 tab(s), 3 Refill(s), Type: Maintenance, Pharmacy: [id number], 2 tab(s) po daily,x90 day(s)   Problem list:    All Problems  Abnormal Pap Smear / ICD-9-.9 / Confirmed  Diabetes Mellitus, Gestational / ICD-9-.80 / Confirmed  History of chicken pox / SNOMED CT 139067561 / Confirmed  Mild major depression / SNOMED CT 268405035 / Confirmed  Obese / ICD-9-.00 / Probable  Resolved: Pregnancy / SNOMED CT 275095049  Resolved: Pregnancy / SNOMED CT 351147389  Resolved: Pregnancy / SNOMED CT 638756363  Resolved: Pregnancy / SNOMED CT 224927523  Resolved: Pregnancy / SNOMED CT 892685236      Objective   Vital Signs   2017 3:45 PM CST Temperature Tympanic 97.9 DegF    Peripheral Pulse Rate 80 bpm    Pulse Site Radial artery    HR Method Manual    Systolic Blood Pressure 108 mmHg    Diastolic Blood Pressure 64 mmHg    Mean Arterial Pressure 79 mmHg    BP Site Left arm    BP Method Manual   2017 4:29 PM CST  Peripheral Pulse Rate 63 bpm    Systolic Blood Pressure 110 mmHg    Diastolic Blood Pressure 64 mmHg    Mean Arterial Pressure 79 mmHg    Oxygen Saturation 98 %      Measurements from flowsheet : Measurements   11/14/2017 3:45 PM CST Height Measured - Standard 60.75 in    Ht/Wt Measurement Refused by Patient? Yes   11/13/2017 4:29 PM CST Height Measured - Standard 60.75 in    Weight Measured - Standard 218.8 lb    BSA 2.06 m2    Body Mass Index 41.68 kg/m2      Integumentary:  Skin was prepared with alcohol and lidocaine used for local anesthesia. Skin prepped with alcohol.  Skin marked 9 cm proximal to right medial epicondyle and 4 cm beyond.  Verified that Nexplanon pricilla was in insertion device.  Nexplanon insertion device inserted at distal chance and directed toward proximal marker.  Insertion slider used to withdraw needle.  Device then withdrawn from injection site.  Nexplanon pricilla palpable in upper arm, palpated both by provider and patient.  Wound dressed in adhesive bandage, which is recommended to be in place for at least 72 hours and pressure bandage, which should be left on for at least 24 hours. .       Results Review   Results review   Lab results   11/14/2017 3:56 PM CST hCG Ql POC Negative    POC Test Comments POC Test Comments         Impression and Plan   Assessment and Plan:       Diagnosis: Nexplanon insertion (GOP00-YG Z30.49).

## 2022-02-16 NOTE — PROGRESS NOTES
Patient:   AYAKA ALCANTAR            MRN: [id number]            FIN: [id number]               Age:   36 years     Sex:  Female     :  1980   Associated Diagnoses:   Moderate Major Depression   Author:   Inocente Gutierrez MD      Visit Information      Date of Service: 2017 11:23 am  Performing Location: [facility] [place]  Encounter#: [id number]      Primary Care Provider (PCP):  Inocente Gutierrez MD    NPI# [id number]      Referring Provider:  No referring provider recorded for selected visit.      Chief Complaint   2017 11:24 AM CDT   Discuss anxiety     Chief complaint and symptoms noted above confirmed with patient.      Interval History   Depression   The course is worsening.  Compliance problems: with medications and Would like to restart.  The effect on daily activities is change in activity level.  Associated symptoms characterized by no suicidal thoughts.        Review of Systems   Constitutional:  Negative.    Respiratory:  Negative.    Cardiovascular:  Negative.    Gastrointestinal:  Negative.    Genitourinary:  Negative.    Immunologic:  Negative.    Musculoskeletal:  Negative.    Integumentary:  Negative.    Neurologic:  Negative.    Psychiatric:  Anxiety, Depression.       Health Status   Allergies:    Allergic Reactions (Selected)  No Known Medication Allergies   Medications:  (Selected)   Prescriptions  Prescribed  sertraline 50 mg oral tablet: 1 tab(s) ( 50 mg ), PO, Daily, Instructions: 1/2 tablet daily for 1 week then 1 tab daily ongoing, # 90 tab(s), 1 Refill(s), Type: Maintenance, Pharmacy: [facility] #[id number], 1 tab(s) po daily,x90 day(s),Instr:1/2 tablet daily for 1 week then 1 tab...   Problem list:    All Problems (Selected)  Abnormal Pap Smear / ICD-9-.9 / Confirmed  Diabetes Mellitus, Gestational / ICD-9-.80 / Confirmed  Obese / ICD-9-.00 / Probable      Histories   Past Medical History:    Active  Abnormal Pap Smear (ICD-9-CM  796.9)  Diabetes Mellitus, Gestational (ICD-9-.80)  Resolved  Pregnancy (SNOMED CT 889983604):  Resolved on 8/18/1997 at 17 years.  Pregnancy (SNOMED CT 108947833):  Resolved on 8/12/2003 at 23 years.  Pregnancy (SNOMED CT 747709947):  Resolved on 1/8/2005 at 24 years.  Pregnancy (SNOMED CT 813020039):  Resolved on 7/15/2008 at 28 years.  Pregnancy (SNOMED CT 331283501):  Resolved on 10/6/2010 at 30 years.   Family History:    Diabetes mellitus  Father (Jose)  Asthma  Daughter (Bibi)     Procedure history:    LEEP (SNOMED CT 35290944) on 2/7/2000 at 19 Years.  Colposcopy.  Excision of birthmark on face.   Social History:        Alcohol Assessment: Current            1 x per month, 3 drinks/episode average.      Tobacco Assessment: Denies Tobacco Use            Never      Substance Abuse Assessment: Denies Substance Abuse            Never      Employment and Education Assessment            Marketing      Home and Environment Assessment            Marital status: .  Spouse/Partner name: Clovis.      Nutrition and Health Assessment            Type of diet: Regular.      Exercise and Physical Activity Assessment: Regular exercise            Exercise frequency: 3-4 times/week.  Exercise type: Running, Weight lifting.      Sexual Assessment            Sexually active: Yes.  Sexual orientation: Heterosexual.      Other Assessment            Regular menses.  Menstrual duration 6 days.  Cycle interval 30 days.  History of abnormal pap smear.        Physical Examination   Vital Signs   4/13/2017 11:24 AM CDT Temperature Tympanic 98.5 DegF    Peripheral Pulse Rate 76 bpm    Pulse Site Radial artery    HR Method Manual    Systolic Blood Pressure 118 mmHg    Diastolic Blood Pressure 72 mmHg    Mean Arterial Pressure 87 mmHg    BP Site Right arm    BP Method Manual      Measurements from flowsheet : Measurements   4/13/2017 11:24 AM CDT Height Measured - Standard 60.75 in    Weight Measured - Standard 218.2 lb    BSA  2.06 m2    Body Mass Index 41.56 kg/m2      General:  Alert and oriented, No acute distress.    Eye:  Normal conjunctiva.    HENT:  Normocephalic.    Neck:  Supple, Non-tender.    Respiratory:  Lungs are clear to auscultation, Respirations are non-labored.       Impression and Plan   Diagnosis     Moderate Major Depression (NSK75-FS F32.1).     Orders     Orders (Selected)   Prescriptions  Prescribed  sertraline 50 mg oral tablet: 1 tab(s) ( 50 mg ), PO, Daily, Instructions: 1/2 tablet daily for 1 week then 1 tab daily ongoing, # 90 tab(s), 1 Refill(s), Type: Maintenance, Pharmacy: [facility] #[id number], 1 tab(s) po daily,x90 day(s),Instr:1/2 tablet daily for 1 week then 1 tab....     Orders     Orders   Requests (Return to Office):  Return to Clinic (Request) (Order): Return in 1 month.

## 2022-02-16 NOTE — NURSING NOTE
Comprehensive Intake Entered On:  11/24/2021 3:05 PM CST    Performed On:  11/24/2021 2:59 PM CST by Edith Sol               Summary   Chief Complaint :   consult per [facility] for ongoing prolonged vaginal bleeding, has had Nexplanon for 4 years, last replaced 8/30/21. Has been on OCPx1 month to help with btb but not getting better.    Menstrual Status :   Menarcheal   Height Measured :   60.75 in(Converted to: 5 ft 1 in, 154.30 cm)    Systolic Blood Pressure :   131 mmHg (HI)    Diastolic Blood Pressure :   82 mmHg (HI)    Mean Arterial Pressure :   98 mmHg   Peripheral Pulse Rate :   85 bpm   Temperature Tympanic :   98.7 DegF(Converted to: 37.1 DegC)    Edith Sol - 11/24/2021 2:59 PM CST   Health Status   Allergies Verified? :   Yes   Medication History Verified? :   Yes   Immunizations Current :   Yes   Medical History Verified? :   Yes   Pre-Visit Planning Status :   Completed   Tobacco Use? :   Never smoker   Edith Sol - 11/24/2021 2:59 PM CST   Consents   Consent for Immunization Exchange :   Consent Denied   Consent for Immunizations to Providers :   Consent Granted   Edith Sol - 11/24/2021 2:59 PM CST   Meds / Allergies   (As Of: 11/24/2021 3:05:39 PM CST)   Allergies (Active)   No Known Medication Allergies  Estimated Onset Date:   Unspecified ; Created By:   Laurie Mitchell CMA; Reaction Status:   Active ; Category:   Drug ; Substance:   No Known Medication Allergies ; Type:   Allergy ; Updated By:   Laurie Mitchell CMA; Reviewed Date:   11/24/2021 3:05 PM CST        Medication List   (As Of: 11/24/2021 3:05:39 PM CST)   Prescription/Discharge Order    albuterol  :   albuterol ; Status:   Prescribed ; Ordered As Mnemonic:   Ventolin HFA 90 mcg/inh inhalation aerosol ; Simple Display Line:   2 puff(s), INH, QID, PRN: for wheezing, 17 g, 1 Refill(s) ; Ordering Provider:   Jackie Alvarez; Catalog Code:   albuterol ; Order Dt/Tm:   8/13/2021 10:16:45 AM CDT          aspirin  :    aspirin ; Status:   Prescribed ; Ordered As Mnemonic:   Aspir 81 oral delayed release tablet ; Simple Display Line:   81 mg, 1 tab(s), Oral, daily, 90 EA, 0 Refill(s) ; Ordering Provider:   Jackie Alvarez; Catalog Code:   aspirin ; Order Dt/Tm:   8/30/2021 2:37:39 PM CDT          desogestrel-ethinyl estradiol  :   desogestrel-ethinyl estradiol ; Status:   Prescribed ; Ordered As Mnemonic:   desogestrel-ethinyl estradiol 0.15 mg-0.03 mg oral tablet ; Simple Display Line:   1 tab(s), Oral, daily, 84 tab(s), 0 Refill(s) ; Ordering Provider:   Inocente Gutierrez MD; Catalog Code:   desogestrel-ethinyl estradiol ; Order Dt/Tm:   10/28/2021 8:30:51 AM CDT          etonogestrel  :   etonogestrel ; Status:   Prescribed ; Ordered As Mnemonic:   Nexplanon 68 mg subcutaneous implant ; Simple Display Line:   68 mg, 1 EA, subcutaneous, once, 1 EA, 0 Refill(s) ; Ordering Provider:   Inocente Gutierrez MD; Catalog Code:   etonogestrel ; Order Dt/Tm:   11/14/2017 4:04:58 PM CST          fluticasone-salmeterol  :   fluticasone-salmeterol ; Status:   Prescribed ; Ordered As Mnemonic:   Advair Diskus 250 mcg-50 mcg inhalation powder ; Simple Display Line:   1 puff(s), Inhale, bid, 60 EA, 1 Refill(s) ; Ordering Provider:   Inocente Gutierrez MD; Catalog Code:   fluticasone-salmeterol ; Order Dt/Tm:   11/11/2021 11:41:40 AM CST          metFORMIN  :   metFORMIN ; Status:   Prescribed ; Ordered As Mnemonic:   MetFORMIN (Eqv-Glucophage XR) 500 mg oral tablet, extended release ; Simple Display Line:   2,000 mg, 4 tab(s), Oral, daily, 360 tab(s), 1 Refill(s) ; Ordering Provider:   Jackie Alvarez; Catalog Code:   metFORMIN ; Order Dt/Tm:   10/1/2021 1:51:44 PM CDT          Miscellaneous Prescription  :   Miscellaneous Prescription ; Status:   Prescribed ; Ordered As Mnemonic:   valved holding chamber spacer ; Simple Display Line:   See Instructions, use with albuterol, 1 EA, 0 Refill(s) ; Ordering Provider:   Gato ADAMS,  Jackie; Catalog Code:   Miscellaneous Prescription ; Order Dt/Tm:   8/13/2021 10:16:50 AM CDT          Miscellaneous Rx Supply  :   Miscellaneous Rx Supply ; Status:   Prescribed ; Ordered As Mnemonic:   accu chek guide test strips ; Simple Display Line:   See Instructions, testing 2x/ day, 200 EA, 3 Refill(s) ; Ordering Provider:   Inocente Gutierrez MD; Catalog Code:   Miscellaneous Rx Supply ; Order Dt/Tm:   11/4/2021 2:03:14 PM CDT          rosuvastatin  :   rosuvastatin ; Status:   Prescribed ; Ordered As Mnemonic:   rosuvastatin 20 mg oral tablet ; Simple Display Line:   20 mg, 1 tab(s), po, hs, 90 EA, 1 Refill(s) ; Ordering Provider:   Jackie Alvarez; Catalog Code:   rosuvastatin ; Order Dt/Tm:   8/30/2021 2:35:55 PM CDT          semaglutide  :   semaglutide ; Status:   Prescribed ; Ordered As Mnemonic:   Ozempic 2 mg/1.5 mL (0.25 mg or 0.5 mg dose) subcutaneous solution ; Simple Display Line:   See Instructions, 0.25 x 2 weeks   0.50 x 3 weeks  rotate injection sites, inject once a week, 1.5 mL, 0 Refill(s) ; Ordering Provider:   Inocente Gutierrez MD; Catalog Code:   semaglutide ; Order Dt/Tm:   11/4/2021 2:01:59 PM CDT

## 2022-02-16 NOTE — PROGRESS NOTES
Patient:   AYAKA ALCANTAR            MRN: [id number]            FIN: [id number]               Age:   41 years     Sex:  Female     :  1980   Associated Diagnoses:   Encounter for removal and reinsertion of Nexplanon   Author:   Jackie Alvarez      Visit Information      Date of Service: 2021 01:53 pm  Performing Location: [facility]  Encounter#: [id number]      Primary Care Provider (PCP):  Inocente Gutierrez MD, NPI# [id number]      Procedure   Procedure   Confirmed: patient.     Performed by: Jackie Alvarez.     Type of procedure: Implanon /Nexplanon removal.     Physical Exam: no relative contraindications found, vital signs Vital Signs   2021 2:20 PM CDT Temperature Tympanic 98.1 DegF    Systolic Blood Pressure 128 mmHg    Diastolic Blood Pressure 76 mmHg    Mean Arterial Pressure 93 mmHg    BP Site Right arm    BP Method Manual    Oxygen Saturation 98 %      .     Informed consent: signed by patient.     Indication: Pt presents to have Implanon/Nexplanon  implantable birth control device removed.  Reason for Removal: would like new device as this one . No periods  Patient  about removal risks. No attempt to remove device will be made if the device is not palpable.  Confirmed no allergies to antiseptic and anesthetic.  Informed consent was given and procedure consent signed and witnessed. Pt was moved to the procedure room and place on cot supine with ______left_______ arm raised and resting above head.  Implanon/Nexplanon device was easily palpable.   The end closest to the elbow was marked with a sterile marker.  The surrounding area was cleaned with Betadine swabs.  The area underneath the end of the implant closest to the elbow was injected with 0.5cc or 1%Lidocaine with epi, being careful to inject under the implant to prevent obscuring or displacing the pricilla.  By pressing down on the end of the implant closest to the axilla, the tip closest  to the elbow was visualized and a 2-3 mm incision in the longitudinal direction of the arm was made here, working toward the elbow..  By Gently pushing the implant toward the incision the tip was visible and then grasped with a sterile mosquito forceps and gently removed.  The entire pricilla, was removed, measured to confirm it's 4 cm in length, and discarded.    A new device was inserted but a new location was given due to the migration of the first device being too close to the axilla. Marker used to chance 8 cm from left olecron process just over the triceps, anesthetized with 1% lido with epi, 5 cc and new sterile NExplanon inserted per protocol,  covered with 4x4 after pt and undersigned palpated in place      A butterfly steri strip 1/4 inch wide was used to close the incision and a bandaid and pressure dressing were applied with 4x4 dressings and Coban.  The patient was instructed to keep the dressing in place for 24 hours then she may remove it and keep clean and dry and covered with a bandaid if needed.  The patient was instructed to watch for s/s of infection and to RTC if she has any concerns.    .     Procedure tolerated: well.     Specimen: disposed of, Implanon device discarded.        Impression and Plan   Diagnosis     Encounter for removal and reinsertion of Nexplanon (KNZ88-MM Z30.46).     Counseled:  Patient, Regarding diagnosis, Regarding treatment, as above.

## 2022-02-16 NOTE — TELEPHONE ENCOUNTER
Order is faxed to [facility] and they will contact patient to schedule.

## 2022-02-16 NOTE — PROGRESS NOTES
AYAKA ALCANTAR MRN: [id number]  2021 : 1980  S: The patient is referred by Dr. Gutierrez for evaluation and treatment of abnormal bleeding.  She had Nexplanon for three years and barely had any bleeding which was changed out after that was expiring; it was replaced on 2021.  Since that time she has been having some bleeding almost daily with some cramping.  She saw Dr. Gutierrez who put her on OCP to try to control the bleeding but she has taken it for a month and still bleeds fairly continuously.  She would like to have something done.  She does not have much as far as pain and has not had a fever.    O: I looked with ultrasound to see if there was any uterine pathology.  The uterus was slightly enlarged at 9 x 5 x 6 cm.  The endometrium was fairly thin and there was some echolucency within the endometrial cavity consistent with her bleeding.  No myometrial abnormalities were seen.  The ovaries were normal bilaterally.   A: Abnormal uterine bleeding apparently related to hormonal changes with the bleeding not presently being controlled with current medications.  No obvious uterine pathology to explain the bleeding at this point.   P: We discussed various options for treatment.  The patient does not want to wait to see if the pill will help control her bleeding and she is discouraged enough that she wanted the Nexplanon removed today.  We did talk about Mirena IUD or ablation, if she has continued heavy abnormal bleeding in the future, she would be a candidate for both of those.  At this point she will stop OCP and see what her own cycles do and this is what she prefers and is a reasonable option.    PROCEDURE:  After discussion the skin was prepped over the Nexplanon in her medial left upper arm.  This is prepped and local anesthetic placed at the site of insertion, 1% lidocaine with epinephrine was used.  A small incision was made with 11-blade and the capsule around the Nexplanon was opened  at the tip and the Nexplanon slipped out.  Steri-strips were used to close the defect and pressure dressing placed.  The procedure was well tolerated.  She will see how things go and get back to us if things are not satisfactory.    Visit level:  Established 3  Vaginal probe ultrasound   Nexplanon removal  D:  11/24/2021  T:  11/26/2021                                               Rafi Cortez MD/sq

## 2022-02-16 NOTE — TELEPHONE ENCOUNTER
---------------------  From: Carlota Carrillo RN (Phone Messages Pool ([address] [place] [address]))   To: T Message Pool ([address] [place] [address]);     Sent: 8/19/2021 8:41:15 AM CDT  Subject: inhaler question       PCP: [facility]      Time of Call: 7:33am       Person Calling: pt  Phone number:  [phone]    Note: VM received from pt stating [facility] prescribed an inhaler at last visit that is causing pt to bring up alot of mucous and has uncontrolled coughing at times. Pt requested to know if the inhaler was supposed to do that? Is it working? Does she need to try something else?    Flovent 2 puffs BID  albuterol inhaler 2 puffs QID PRN    Last office visit and reason: 8/13/21 well adult  [facility]---------------------  From: Cherelle Carnes CMA (T Message Pool ([address] [place] [address]))   To: [facility] Message Pool ([address] [place] [address]);     Sent: 8/19/2021 8:47:35 AM CDT  Subject: FW: inhaler question---------------------  From: Dorinda Baez LPN ([facility] Message Pool ([address] [place] [address]))   To: Jackie Alvarez;     Sent: 8/19/2021 9:36:43 AM CDT  Subject: FW: inhaler question---------------------  From: Jackie Alvarez   To: [facility] Message Pool ([address] [place] [address]);     Sent: 8/19/2021 10:22:20 AM CDT  Subject: RE: inhaler question     please set her up for repeat CXR (does not need to see me). We had talked about this when I called her earlier in the week.    Advise her to rinse out her mouth after the inhaler and use the spacer prescribed. I believe the inhalers are opening up her airways (she was tight with wheezing) and it is possible it might trigger some coughing initially but we should start seeing some [id number] Discussed message with patient. She tells me that she already has an appt scheduled for another chest xray. I do see another appt scheduled for labs and then one for Nexplanon removal. She will monitor and let us know if she continues to have problems.

## 2022-02-16 NOTE — PROGRESS NOTES
Patient:   AYAKA ALCANTAR            MRN: [id number]            FIN: [id number]               Age:   37 years     Sex:  Female     :  1980   Associated Diagnoses:   Stress   Author:   Inocente Gutierrez MD      Chief Complaint   2017 3:38 PM CDT    Anxiety- medication not currently working,discuss change     Chief complaint and symptoms noted above confirmed with patient.      History of Present Illness             The patient presents with anxiety.  The anxiety is characterized by difficulty concentrating.  The severity of the anxiety is severe.  The anxiety is constant.  The anxiety has lasted for 1 week(s).  The context of the anxiety: occurred in association with the inability to cope with stress and  asked for divorce,.  Exacerbating factors consist of emotional stress and social change.  Relieving factors consist of none.        Review of Systems   Constitutional:  Negative.    Respiratory:  Negative.    Cardiovascular:  Negative.    Psychiatric:  Anxiety, Not suicidal.       Health Status   Allergies:    Allergic Reactions (Selected)  No Known Medication Allergies   Medications:  (Selected)   Prescriptions  Prescribed  diazePAM 5 mg oral tablet: 1 tab(s) ( 5 mg ), PO, QID, PRN: for anxiety, # 12 tab(s), 1 Refill(s), Type: Maintenance, Pharmacy: [facility] #[id number], 1 tab(s) po qid,PRN:for anxiety  sertraline 50 mg oral tablet: 1 tab(s) ( 50 mg ), PO, Daily, Instructions: 1/2 tablet daily for 1 week then 1 tab daily ongoing, # 90 tab(s), 1 Refill(s), Type: Maintenance, Pharmacy: [facility] #[id number], 1 tab(s) po daily,x90 day(s),Instr:1/2 tablet daily for 1 week then 1 tab...   Problem list:    All Problems (Selected)  Abnormal Pap Smear / ICD-9-.9 / Confirmed  Diabetes Mellitus, Gestational / ICD-9-.80 / Confirmed  Mild major depression / SNOMED CT 634340638 / Confirmed  Obese / ICD-9-.00 / Probable      Histories   Past Medical History:    Active  Abnormal Pap  Smear (ICD-9-.9)  Diabetes Mellitus, Gestational (ICD-9-.80)  Resolved  Pregnancy (SNOMED CT 619567470):  Resolved on 8/18/1997 at 17 years.  Pregnancy (SNOMED CT 498909736):  Resolved on 8/12/2003 at 23 years.  Pregnancy (SNOMED CT 967693190):  Resolved on 1/8/2005 at 24 years.  Pregnancy (SNOMED CT 640417158):  Resolved on 7/15/2008 at 28 years.  Pregnancy (SNOMED CT 542843342):  Resolved on 10/6/2010 at 30 years.   Family History:    Diabetes mellitus  Father (Jose)  Asthma  Daughter (Bibi)     Procedure history:    LEEP (SNOMED CT 52627672) on 2/7/2000 at 19 Years.  Colposcopy.  Excision of birthmark on face.      Physical Examination   Vital Signs   7/26/2017 3:38 PM CDT Temperature Tympanic 97.6 DegF  LOW    Peripheral Pulse Rate 68 bpm    Pulse Site Radial artery    HR Method Manual    Systolic Blood Pressure 120 mmHg    Diastolic Blood Pressure 74 mmHg    Mean Arterial Pressure 89 mmHg    BP Site Right arm    BP Method Manual      Measurements from flowsheet : Measurements   7/26/2017 3:38 PM CDT    Weight Measured - Standard                216.4 lb     General:  Alert and oriented, Moderate distress.    Respiratory:  Lungs are clear to auscultation.    Cardiovascular:  Normal rate.    Psychiatric:  Cooperative, No Abnormal / Psychotic thoughts.         Mood and affect: Labile, Sad, Tearful.         Behavior: Restless.         Judgment: Able to make sensible decisions.         Thought process: Appropriate.       Impression and Plan   Diagnosis     Stress (IPF26-JQ Z73.3).     Orders     Orders (Selected)   Prescriptions  Prescribed  diazePAM 5 mg oral tablet: 1 tab(s) ( 5 mg ), PO, QID, PRN: for anxiety, # 12 tab(s), 1 Refill(s), Type: Maintenance, Pharmacy: [facility] #[id number], 1 tab(s) po qid,PRN:for anxiety.     Orders     Orders   Requests (Return to Office):  Return to Clinic (Request) (Order): Return in 2 weeks.

## 2022-02-16 NOTE — NURSING NOTE
Depression Screening Entered On:  8/16/2021 1:52 PM CDT    Performed On:  8/13/2021 1:51 PM CDT by Delisa Tierney               Depression Screening   Little Interest - Pleasure in Activities :   Not at all   Feeling Down, Depressed, Hopeless :   Not at all   Initial Depression Screen Score :   0 Score   Poor Appetite or Overeating :   More than half the days   Trouble Falling or Staying Asleep :   More than half the days   Feeling Tired or Little Energy :   More than half the days   Trouble Concentrating :   Several days   Moving or Speaking Slowly :   Not at all   Thoughts Better Off Dead or Hurting Self :   Not at all   Difficulty at Work, Home, Getting Along :   Somewhat difficult   Delisa Tierney - 8/16/2021 1:51 PM CDT

## 2022-02-16 NOTE — TELEPHONE ENCOUNTER
Entered by Laurie Mitchell CMA on September 27, 2021 1:28:45 PM CDT  ---------------------  From: Laruie Mitchell CMA   [facility]    Sent: 9/27/2021 1:28:44 PM CDT  Subject: Medication Management     ** Submitted: **  Order:metFORMIN (MetFORMIN (Eqv-Glucophage XR) 500 mg oral tablet, extended release)  4 tab(s)  Oral  daily  Qty:  120 tab(s)        Refills:  0          Substitutions Allowed     Route To Pharmacy [facility] [place] [facility]    Signed by Laurie Mitchell CMA  9/27/2021 6:28:00 PM [facility]    ** Submitted: **  Complete:metFORMIN (metFORMIN 500 mg oral tablet, extended release)   Signed by Laurie Mitchell CMA  9/27/2021 6:28:00 PM [facility]    ** Not Approved:  **  metFORMIN (metformin  mg tablet,extended release 24 hr)  TAKE ONE TABLET BY MOUTH WITH EVENING MEAL FOR 1 WEEK, THEN INCREASE TO 2 TABS 1 WEEK, THEN 3 TABS FOR 1 WEEK THEN 4 TABS AT EVENING MEAL  Qty:  70 tab(s)        Days Supply:  28        Refills:  0          Substitutions Allowed     [facility]   Signed by Laurie Mitchell CMA            ------------------------------------------  From: [facility]  To: Jackie Alvarez  Sent: September 24, 2021 12:29:56 PM CDT  Subject: Medication Management  Due: September 17, 2021 11:19:24 PM CDT     ** On Hold Pending Signature **     Drug: metFORMIN (MetFORMIN (Eqv-Fortamet) 500 mg oral tablet, extended release), 1 tab(s) PO with evening meal x1 week, then increase to 2 tabs x1 week, then 3 tabs x1week then 4 tabs at evenin meal.  Quantity: 70 tab(s)  Days Supply: 0  Refills: 0  Substitutions Allowed  Notes from Pharmacy:     Dispensed Drug: metFORMIN (MetFORMIN (Eqv-Glucophage XR) 500 mg oral tablet, extended release), TAKE ONE TABLET BY MOUTH WITH EVENING MEAL FOR 1 WEEK, THEN INCREASE TO 2 TABS 1 WEEK, THEN 3 TABS  FOR 1 WEEK THEN 4 TABS AT EVENING MEAL  Quantity: 70 tab(s)  Days Supply: 28  Refills: 0  Substitutions Allowed  Notes from Pharmacy:  ------------------------------------------

## 2022-02-16 NOTE — NURSING NOTE
CAGE Assessment Entered On:  8/16/2021 1:52 PM CDT    Performed On:  8/13/2021 1:52 PM CDT by Delisa Tierney               Assessment   Have people annoyed you by criticizing your drinking :   No   Have you ever felt bad or guilty about your drinking :   No   Have you ever taken a drink first thing in the morning to steady your nerves or get rid of a hangover (Eye-opener) :   No   Delisa Tierney - 8/16/2021 1:52 PM CDT

## 2022-02-16 NOTE — PROGRESS NOTES
AYAKA ALCANTAR : 1980 MRN: [id number]  CHART NOTE:  Her daughter was diagnosed with positive influenza B requests Tamiflu 75 mg once daily for ten days; if active symptoms go to b.i.d. until gone.    D:  2019  T:  2019                                                Russell Barrow PA-C/sq

## 2022-02-16 NOTE — TELEPHONE ENCOUNTER
---------------------  From: Maira Bland MA (Phone Messages Pool ([id number]_[facility]))   To: [facility] Message Pool ([id number]_[facility]);     Sent: 10/4/2021 2:08:46 PM CDT  Subject: FW: FW: Jackie Alvarez           ---------------------  From: AYAKA ALCANTAR  To: [facility]  Sent: 10/04/2021 02:02 p.m. CDT  Subject: RE: FW: Jackie Alvarez  Yes please.  Thanks.  ---------------------  From: Sierra Kaur RN (Phone Messages Pool ([id number]  To: AYAKA ALCANTAR  Sent: 10/4/2021 1:31:14 PM CDT  Subject: RE: FW: Jackie Alvarez Dr. Tashjian will be back in clinic tomorrow if you would like me to forward this to him.       Thank you,  Macie SALMON RN                 ---------------------  From: AYAKA ALCANTAR  To: [facility]  Sent: 10/04/2021 01:29 p.m. CDT  Subject: RE: FW: Jackie Alvarez  Please have another provider look this over.  I believe my primary Dr. Gutierrez is out as well.  Thanks.  Kojo  ---------------------  From: Maira Bland MA (Phone Messages Pool ([id number]_[facility]))  To: AYAKA ALCANTAR  Sent: 10/4/2021 1:25:03 PM CDT  Subject: FW: Jackie Alvarez,       Jackie Gato is out of clinic until 10/18/2021.  Please let us know if you want this to wait until she gets back or to send to another provider.       Sincerely,       Maira ZHENG CMA            ---------------------  From: AYAKA ALCANTAR  To: [facility]  Sent: 10/04/2021 01:18 p.m. CDT  Subject: Gato ADAMS, Jackie Mejia.  I wanted to discuss a few things.  #1. My A1c only went down by .01.  I have been on the metformin for over a month.  Don t you think that should have improved more?  #2. My fasting blood sugar was 211.  That is high!  It makes me anxious.  I am so tired a lot and I don t understand.  Would insulin help me?  When I had diabetes with my pregnancies I always had  "insulin and with my #5 baby I had a pump.  I just hate worrying about it.  #3. I have been bleeding for about 10 days.  I don t think that I did the first time I had the Nexplanon and because it has been longer than a week I am concerned about it.  It s not just spotting, it is a steady flow.  Do you think that the new meds I am on would make a difference?  #4. Lastly.  You prescribed me cholesterol meds.  I know a couple people close to me that have had a lot of issues with \"statin\" drugs.  My Dad takes 5mg only 2x a week and I m supposed to take 20mg a day!  I have ready about it and I am nervous to take it.  I know everyone is different but my cholesterol was only like 1 over the normal range.  I just want to make sure this is how much I need to take.  Thanks for answering my questions and I look forward to hearing back from you.  Thanks.  Kojo---------------------  From: Edith Sol ([employer] Message Pool ([id number]_[facility]))   To: Inocente Gutierrez MD;     Sent: 10/5/2021 7:22:07 AM CDT  Subject: FW: FW: Jackie Alvarez---------------------  From: Inocente Gutierrez MD   To: [place] Pool ([id number]_[facility]); AYAKA ALCANTAR    Sent: 10/5/2021 7:56:33 AM CDT  Subject: RE: FW: Jackie Alvarez,    #1 and 2)  I would give it 3 months before I made any changes.  The next medication I would add is Trulicity (a once a week shot).  I do not think you need insulin at this time.    #3)  Irregular bleeding is the most common side effect especially in the first three months after insertion.  I would like you to come in and check a hemoglobin sometime and if low we will need to either add an estrogen component or remove the Nexplanon.    #4)  I think it is OK for you to try taking the rosuvastatin 2x a week to begin with.  Monitor the results and increase dosing if necessary.    Jian Gutierrez MD  "

## 2022-02-16 NOTE — TELEPHONE ENCOUNTER
---------------------  From: Jackie Alvarez   To: AYAKA ALCANTAR    Sent: 8/31/2021 1:23:59 PM CDT  Subject: General Message     The test for blood clot is negative. Keep the appointment for the CT scan please. Thank you.    DAREN Villaolbos      Results:  Date Result Name Value Ref Range   8/30/2021 2:55 PM D-Dimer 0.22 mcg/mL FEU ( - <0.50)

## 2022-02-16 NOTE — PROGRESS NOTES
Chief Complaint  c/o bilateral arm numbness x Memorial Day wkend; has been going to chiropractor - not helping; depression f/u  History of Present Illness  Patient comes in complaining of bilateral arm numbness and tingling since Memorial Day. ?She does not recall any incident that brought it on. ?She has not been dropping anything but just feels like she does not have as good sensation. ?She has had no weakness with it.  In addition to that she is in for follow-up of her depression. ?Her biggest stressor is her  continues to drink in spite of pancreatitis. ?  Review of Systems  Review of systems is negative for any weight gain or weight loss. ?There is no homicidal or suicidal ideation. ?She denies any other musculoskeletal complaints. ?She has seen the chiropractor and this has not been helpful.  Problem List/Past Medical History  Ongoing  Abnormal Pap Smear  Diabetes Mellitus, Gestational  Mild major depression  Obese  Resolved  Pregnancy  Pregnancy  Pregnancy  Pregnancy  Pregnancy  Procedure/Surgical History  LEEP (02/07/2000),  Colposcopy,  Excision of birthmark on face.  Home Medications  sertraline 50 mg oral tablet, 50 mg, 1 tab(s), po, daily, 1 refills  Allergies  No Known Medication Allergies  Social History  Alcohol - Current  Exercise and Physical Activity  Exercise frequency: 3-4 times/week. Exercise type: Running, Weight lifting.  Home and Environment  Marital status: . Spouse/Partner name: Clovis.  Nutrition and Health  Type of diet: Regular.  Other  Regular menses. Menstrual duration 6 days. Cycle interval 30 days. History of abnormal pap smear.  Tobacco - Denies Tobacco Use  Never  Family History  Asthma: Daughter.  Diabetes mellitus: Father.  Immunizations  Physical Exam  Vitals & Measurements  T:?98.3?(Tympanic)?  HR:?76?(Peripheral)?  BP:?116/68?  HT:?60.75?in?  WT:?220.2?lb?  BMI:?41.94?  Physical exam shows vital signs stable afebrile no apparent distress  PHQ 9 is 3 she has no  homicidal or suicidal ideation.  Examination of her arms show full range of motion with good  and strength. ?She reports decreased sensation but this is not reproducible.  Lab Results  Diagnostic Results  Assessment/Plan  Bilateral numbness and tingling of arms and legs  We will get a referral for EMG and neurology consultation.  Ordered:  Referral (Request)  Mild major depression  Will continue on the sertraline for now and follow-up in 6 months, sooner as needed.

## 2022-02-16 NOTE — TELEPHONE ENCOUNTER
---------------------  From: Sierra Kaur RN (Phone Messages Pool ([id number]_[facility]))   To: Daniela Adhikari;     Sent: 12/8/2021 11:09:00 AM CST  Subject: FW: FW: CONSUMER MESSAGE FW: aDniela Adhikari           ---------------------  From: AYAKA ALCANTAR  To: [facility]  Sent: 12/08/2021 11:08 a.m. CST  Subject: RE: FW: CONSUMER MESSAGE FW: Daniela Adhikari  Thank you.  I did prime the pen each time, my bad.    I have had some nausea each time but it was the worst the first week.  I have been much better now.  My sugars and looking much better as well, however I have also 100% changed my diet and I am not eating any breads, pasta or potatoes.  I m starting  to feel much better.  Have a Crisry Jun.  I ll see you in January.  Kojo       Addendum by Sierra Kaur RN on December 08, 2021 11:01:54 AM CST  ---------------------  From: Sierra Kaur RN (Phone Messages Pool ([id number]_[facility]))  To: AYAKA ALCANTAR  Sent: 12/8/2021 11:01:54 AM CST  Subject: FW: CONSUMER MESSAGE FW: Daniela Adhikari       Addendum by Daniela Adhikari on December 08, 2021 10:55:25 AM CST  ---------------------  From: Daniela Adhikari  To: Phone Messages Pool ([id number]_[facility]);  Sent: 12/8/2021 10:55:25 AM CST  Subject: RE: CONSUMER MESSAGE FW: Daniela Varma,  I hope you are doing great!  Happy Holidays.       I sent in the new order for the 1.0 mg Ozempic pen.  Remember that you only prime the first time you use a new pen, not every week. If you primed it every time you will not be able to get your full doses.       Thank you,  Let me know if you have any questions,  Chey Adhikari  ---------------------  From: Jennifer Henderson LPN (Phone Messages Pool ([id number]_[facility]))  To: Daniela Adhikari;  Sent: 12/8/2021 10:44:54 AM CST  Subject: CONSUMER MESSAGE FW: Daniela Adhikari                      ---------------------  From: AYAKA ALCANTAR  To: [employer] [facility]  Sent: 12/08/2021 10:39 a.m. CST  Subject: Daniela Adhikari  Good Morning Chey,  My Ozempic ran out last night.  I was supposed to take .50 last night and didn t have enough to even take .25.  I maybe used too much to prep the pen.  Can you call in another one for me?  I believe that next week I am supposed to start taking the full dose of .100.  Thanks,  Kojo Alcantar---------------------  From: Daniela Adhikari   To: [employer] Pool ([id number]_[place] [facility]);     Sent: 12/8/2021 11:34:45 AM CST  Subject: RE: FW: CONSUMER MESSAGE FW: Daniela Adhikari     Thanks for the update.  Keep up to good work!  See you in the new year.    Chey Adhikari---------------------  From: Vincent LAM, Sierra (Phone Messages Pool ([id number]_[place] [facility])   To: AYAKA ALCANTAR    Sent: 12/8/2021 11:35:26 AM CST  Subject: FW: FW: CONSUMER MESSAGE FW: Daniela Adhikari

## 2022-02-16 NOTE — TELEPHONE ENCOUNTER
---------------------  From: Jackie Alvarez   To: AYAKA ALCANTAR    Sent: 10/1/2021 1:53:45 PM CDT  Subject: General Message     Lab work looks good, I should have you repeat the A1C in 6 MONTHS then see me a couple days after that. I would like you to SEE MORENITA CHAUHAN RD. She will help you with better understanding of how to control diabetes and when/how to test.  Just call the clinic to schedule with her if you haven't yet.    DAREN Villalobos      Results:  Date Result Name Ind Value Ref Range   9/30/2021 8:03 AM Cholesterol  184 mg/dL ( - <200)   9/30/2021 8:03 AM HDL ((L)) 48 mg/dL (> OR = 50 - )   9/30/2021 8:03 AM Triglyceride  95 mg/dL ( - <150)   9/30/2021 8:03 AM LDL ((H)) 116    9/30/2021 8:03 AM Cholesterol/HDL Ratio  3.8 ( - <5.0)   9/30/2021 8:03 AM Non-HDL Cholesterol ((H)) 136 ( - <130)   9/30/2021 8:03 AM U Creatinine  109 mg/dL (20 - 275)   9/30/2021 8:03 AM U Microalbumin  0.4 mg/dL (See Note: - )   9/30/2021 8:03 AM Ur Microalbumin/Creatinine Ratio  4 ( - <30)   9/30/2021 8:03 AM Glucose Level ((H)) 211 mg/dL (65 - 99)   9/30/2021 8:03 AM BUN  16 mg/dL (7 - 25)   9/30/2021 8:03 AM Creatinine Level  0.84 mg/dL (0.50 - 1.10)   9/30/2021 8:03 AM eGFR  85 mL/min/1.73m2 (> OR = 60 - )   9/30/2021 8:03 AM eGFR African American  99 mL/min/1.73m2 (> OR = 60 - )   9/30/2021 8:03 AM BUN/Creat Ratio  NOT APPLICABLE (6 - 22)   9/30/2021 8:03 AM Sodium Level  138 mmol/L (135 - 146)   9/30/2021 8:03 AM Potassium Level  4.3 mmol/L (3.5 - 5.3)   9/30/2021 8:03 AM Chloride Level  108 mmol/L (98 - 110)   9/30/2021 8:03 AM CO2 Level  22 mmol/L (20 - 32)   9/30/2021 8:03 AM Calcium Level  9.2 mg/dL (8.6 - 10.2)   9/30/2021 8:03 AM WBC  5.8 (3.8 - 10.8)   9/30/2021 8:03 AM RBC  4.71 (3.80 - 5.10)   9/30/2021 8:03 AM Hgb  14.3 gm/dL (11.7 - 15.5)   9/30/2021 8:03 AM Hct  43.0 % (35.0 - 45.0)   9/30/2021 8:03 AM MCV  91.3 fL (80.0 - 100.0)   9/30/2021 8:03 AM MCH  30.4 pg (27.0 - 33.0)   9/30/2021 8:03 AM  MCHC  33.3 gm/dL (32.0 - 36.0)   9/30/2021 8:03 AM RDW  12.2 % (11.0 - 15.0)   9/30/2021 8:03 AM Platelet  323 (140 - 400)   9/30/2021 8:03 AM MPV  9.8 fL (7.5 - 12.5)   9/30/2021 8:03 AM Hgb A1c ((H)) 7.3 ( - <5.7)

## 2022-02-16 NOTE — TELEPHONE ENCOUNTER
---------------------  From: Sierra Kaur RN (Phone Messages Pool ([id number]_[place] [employer])   To: AYAKA ALCANTAR    Sent: 10/4/2021 1:31:14 PM CDT  Subject: RE: FW: Jackie Alvarez,    Dr. Gutierrez will be back in clinic tomorrow if you would like me to forward this to him.      Thank you,  Macie SALMON RN        ---------------------  From: AYAKA ALCANTAR  To: [facility]  Sent: 10/04/2021 01:29 p.m. CDT  Subject: RE: FW: Jackie Alvarez  Please have another provider look this over.  I believe my primary Dr. Gutierrez is out as well.    Thanks.  Kojo  ---------------------  From: Maira Bland MA (Phone Messages Pool ([id number]_[place] [facility]))  To: AYAKA ALCANTAR  Sent: 10/4/2021 1:25:03 PM CDT  Subject: FW: Jackie Alvarez,       Jackie Hoskins is out of clinic until 10/18/2021.  Please let us know if you want this to wait until she gets back or to send to another provider.       Sincerely,       Maira ZHENG CMA            ---------------------  From: AYAKA ALCANTAR  To: [facility]  Sent: 10/04/2021 01:18 p.m. CDT  Subject: Jackie Alvarez.  I wanted to discuss a few things.  #1. My A1c only went down by .01.  I have been on the metformin for over a month.  Don t you think that should have improved more?  #2. My fasting blood sugar was 211.  That is high!  It makes me anxious.  I am so tired a lot and I don t understand.  Would insulin help me?  When I had diabetes with my pregnancies I always had insulin and with my #5 baby I had a pump.  I just hate worrying about it.  #3. I have been bleeding for about 10 days.  I don t think that I did the first time I had the Nexplanon and because it has been longer than a week I am concerned about it.  It s not just spotting, it is a steady flow.  Do you think that the new meds I am on would make a difference?  #4. Lastly.  You prescribed me cholesterol meds.  I  "know a couple people close to me that have had a lot of issues with \"statin\" drugs.  My Dad takes 5mg only 2x a week and I m supposed to take 20mg a day!  I have ready about it and I am nervous to take it.  I know everyone is different but my cholesterol was only like 1 over the normal range.  I just want to make sure this is how much I need to take.  Thanks for answering my questions and I look forward to hearing back from you.  Thanks.  Kojo  "

## 2022-02-16 NOTE — NURSING NOTE
Asthma Assessment Entered On:  8/13/2021 11:13 AM CDT    Performed On:  8/13/2021 11:13 AM CDT by Jackie Alvarez   Asthma Severity :   Mild Persistent   Jackie Alvarez - 8/13/2021 11:13 AM CDT   Asthma Precipitating Factors Grid   Exercise :   Yes   Jackie Alvarez - 8/13/2021 11:13 AM CDT

## 2022-02-16 NOTE — PROGRESS NOTES
Patient:   AYAKA ALCANTAR            MRN: [id number]            FIN: [id number]               Age:   37 years     Sex:  Female     :  1980   Associated Diagnoses:   Well adult exam; Mild major depression   Author:   Inocente Gutierrez MD      Report Summary   DiagnosisCourse:  Well controlled. Orders  Will decide between Nexplanon and IUD.  Counseled:  Patient.    Visit Information      Date of Service: 10/27/2017 03:00 pm  [facility]  Encounter#: [id number]      Primary Care Provider (PCP):  Inocente Gutierrez MD    NPI# [id number]      Referring Provider:  Inocente Gutierrez MD# [id number]   Visit type:  Annual exam.    Source of history:  Self.    History limitation:  None.       Chief Complaint   10/27/2017 3:18 PM CDT   Annual px     Patient presents today for a general physical.      Well Adult History   Well Adult History   The general health status is good.  The patient's diet is described as balanced.  The effect on daily activities is no change in activity level, no change in eating habits and no change in sexual activity.  Additional pertinent history: seat belt use, daily caffeine use, tobacco use none and alcohol use socially.        Review of Systems   Constitutional:  No fever, No chills, No sweats.    Eye:  No blurring, No double vision.    Respiratory:  No shortness of breath, No cough, No wheezing.    Cardiovascular:  No chest pain, No palpitations, No peripheral edema.    Breast:  Negative.    Gastrointestinal:  No nausea, No vomiting, No diarrhea, No constipation.    Genitourinary:  No dysuria.    Gynecologic:  Negative.    Hematology/Lymphatics:  No bruising tendency.    Endocrine:  No excessive thirst, No polyuria, No cold intolerance, No heat intolerance.    Musculoskeletal:  No joint pain.    Integumentary:  No rash.    Neurologic:  Not alert and oriented X4, No confusion, No numbness, No tingling, No headache.    Psychiatric:   Depression.    All other systems reviewed and negative      Health Status   Allergies:    Allergic Reactions (Selected)  No Known Medication Allergies   Medications:  (Selected)   Prescriptions  Prescribed  diazePAM 5 mg oral tablet: 1 tab(s) ( 5 mg ), PO, QID, PRN: for anxiety, # 12 tab(s), 1 Refill(s), Type: Maintenance, Pharmacy: [facility] #[id number], 1 tab(s) po qid,PRN:for anxiety  sertraline 50 mg oral tablet: 1 tab(s) ( 50 mg ), PO, Daily, # 90 tab(s), 1 Refill(s), Type: Maintenance, Pharmacy: [facility] #[id number]   Problem list:    All Problems  Abnormal Pap Smear / ICD-9-.9 / Confirmed  Diabetes Mellitus, Gestational / ICD-9-.80 / Confirmed  Mild major depression / SNOMED CT 612427003 / Confirmed  Obese / ICD-9-.00 / Probable  Resolved: Pregnancy / SNOMED CT 659734432  Resolved: Pregnancy / SNOMED CT 949022496  Resolved: Pregnancy / SNOMED CT 289933190  Resolved: Pregnancy / SNOMED CT 566559112  Resolved: Pregnancy / SNOMED CT 275518806      Histories   Past Medical History:    Active  Abnormal Pap Smear (ICD-9-.9)  Diabetes Mellitus, Gestational (ICD-9-.80)  Resolved  Pregnancy (SNOMED CT 214308839):  Resolved on 8/18/1997 at 17 years.  Pregnancy (SNOMED CT 252921667):  Resolved on 8/12/2003 at 23 years.  Pregnancy (SNOMED CT 038509096):  Resolved on 1/8/2005 at 24 years.  Pregnancy (SNOMED CT 588870816):  Resolved on 7/15/2008 at 28 years.  Pregnancy (SNOMED CT 494947037):  Resolved on 10/6/2010 at 30 years.   Family History:    Diabetes mellitus  Father (Jose)  Asthma  Daughter (Bibi)     Procedure history:    LEEP (SNOMED CT 35816630) on 2/7/2000 at 19 Years.  Colposcopy.  Excision of birthmark on face.   Social History:        Alcohol Assessment: Current            1 x per month, 3 drinks/episode average.      Tobacco Assessment: Denies Tobacco Use            Never      Substance Abuse Assessment: Denies Substance Abuse            Never      Employment and Education  Assessment            Marketing      Home and Environment Assessment            Marital status: .  Spouse/Partner name: Eleanor      Nutrition and Health Assessment            Type of diet: Regular.      Exercise and Physical Activity Assessment: Regular exercise            Exercise frequency: 3-4 times/week.  Exercise type: Running, Weight lifting.      Sexual Assessment            Sexually active: Yes.  Sexual orientation: Heterosexual.      Other Assessment            Regular menses.  Menstrual duration 6 days.  Cycle interval 30 days.  History of abnormal pap smear.  ,        Alcohol Assessment: Current            1 x per month, 3 drinks/episode average.      Tobacco Assessment: Denies Tobacco Use            Never      Substance Abuse Assessment: Denies Substance Abuse            Never      Employment and Education Assessment            Marketing      Home and Environment Assessment            Marital status: .  Spouse/Partner name: Eleanor      Nutrition and Health Assessment            Type of diet: Regular.      Exercise and Physical Activity Assessment: Regular exercise            Exercise frequency: 3-4 times/week.  Exercise type: Running, Weight lifting.      Sexual Assessment            Sexually active: Yes.  Sexual orientation: Heterosexual.      Other Assessment            Regular menses.  Menstrual duration 6 days.  Cycle interval 30 days.  History of abnormal pap smear.        Physical Examination   Vital Signs   10/27/2017 3:18 PM CDT Temperature Tympanic 97.9 DegF    Peripheral Pulse Rate 88 bpm    Pulse Site Radial artery    HR Method Manual    Systolic Blood Pressure 120 mmHg    Diastolic Blood Pressure 72 mmHg    Mean Arterial Pressure 88 mmHg    BP Site Right arm    BP Method Manual      Measurements from flowsheet : Measurements   10/27/2017 3:18 PM CDT Height Measured - Standard 60.75 in    Weight Measured - Standard 218 lb    BSA 2.06 m2    Body Mass Index 41.53 kg/m2      General:   Alert and oriented, No acute distress.    Eye:  Pupils are equal, round and reactive to light, Extraocular movements are intact, Normal conjunctiva.    HENT:  Normocephalic, Tympanic membranes are clear, Normal hearing, Oral mucosa is moist, No pharyngeal erythema.    Neck:  Supple, No carotid bruit, No lymphadenopathy, No thyromegaly.    Respiratory:  Lungs are clear to auscultation, Breath sounds are equal.    Cardiovascular:  Regular rhythm, No murmur, Good pulses equal in all extremities, No edema.    Breast:  No mass, No tenderness, No discharge.    Gastrointestinal:  Soft, Non-tender, Normal bowel sounds, No organomegaly.    Genitourinary:  Normal genitalia for age and sex, No inguinal tenderness, No lesions.         Groin/ inguinal region: Within normal limits.         Labia: Within normal limits.         Cervix: No lesions.         Uterus: Within normal limits.         Ovaries: Within normal limits.         Adnexa: Within normal limits.    Musculoskeletal:  Normal range of motion.    Integumentary:  Warm, Dry, Pink.    Neurologic:  Alert, Oriented, Normal sensory, Normal motor function, No focal deficits.    Psychiatric:  Cooperative, Appropriate mood & affect, Normal judgment, Patient's PHQ9 and CAGE questionnaire reviewed and discussed with patient..       Impression and Plan   Diagnosis     Well adult exam (YWU25-XH Z00.00).     Course:  Well controlled.    Orders     Will decide between Nexplanon and IUD.     Diagnosis     Mild major depression (ZBD31-QX F32.0).     Course:  Improving.    Orders     Orders   Pharmacy:  sertraline 50 mg oral tablet (Prescribe): 2 tab(s) ( 100 mg ), PO, Daily, x 90 day(s), # 180 tab(s), 3 Refill(s), Type: Maintenance, Pharmacy: [facility] #[id number], 2 tab(s) po daily,x90 day(s)  sertraline 50 mg oral tablet (Modify): 1 tab(s) ( 50 mg ), PO, Daily, x 90 day(s), # 90 tab(s), 1 Refill(s), Type: Hard Stop, Pharmacy: [facility] #[id number]  diazePAM 5 mg oral tablet (Complete).      Counseled:  Patient.

## 2022-03-02 VITALS
SYSTOLIC BLOOD PRESSURE: 107 MMHG | HEIGHT: 61 IN | DIASTOLIC BLOOD PRESSURE: 73 MMHG | WEIGHT: 206.4 LBS | BODY MASS INDEX: 38.97 KG/M2

## 2022-03-02 NOTE — LETTER
(Inserted Image. Unable to display)   January 04, 2022    AYAKA ALCANTAR  [address] [place] [id number]            Dear AYAKA,      Thank you for selecting [facility] for your healthcare needs.    Your Healthcare Provider has recommended that you schedule a diabetes management appointment with our Certified Diabetes Educator, Daniela Adhikari RD, CD, CDE.     Please bring your glucose meter and your blood glucose diary to your appointment.    Available services include:  - Education about diabetes with guidance and support   - Education on the 7 Self Care Behaviors for Diabetes Management:     Healthy Eating   portion control, label readings, carbohydrate recommendations, heart healthy guidelines, meal planning    Being Active - Physical activity guidelines     Monitoring   blood glucose targets, evaluation of blood glucose readings and lab results    Taking Medication   medication management    Problem Solving   discuss any concerns/ questions     Healthy Coping   disease progression and management    Reducing Risks   prevention strategies to help avoid potential complications related to uncontrolled diabetes  - Weight loss strategies      (FYI   Regarding office visit: In some instances, a video visit may be offered as an option.)        To schedule an appointment or if you have further questions, please contact your clinic at [phone].      Powered by [employer]    Sincerely,    Daniela Adhikari RD, CD, CDE

## 2022-03-02 NOTE — NURSING NOTE
Quick Intake Entered On:  1/19/2022 9:12 AM CST    Performed On:  1/19/2022 9:11 AM CST by Daniela Adhikari               Summary   Menstrual Status :   Menarcheal   Weight Measured :   206.4 lb(Converted to: 206 lb 6 oz, 93.621 kg)    Height Measured :   60.75 in(Converted to: 5 ft 1 in, 154.30 cm)    Body Mass Index :   39.32 kg/m2 (HI)    Body Surface Area :   2 m2   Systolic Blood Pressure :   107 mmHg   Diastolic Blood Pressure :   73 mmHg   Mean Arterial Pressure :   84 mmHg   Daniela Adhikari - 1/19/2022 9:11 AM CST

## 2022-03-02 NOTE — TELEPHONE ENCOUNTER
Entered by Joanie Dillon CMA on January 28, 2022 11:03:08 AM CST  ---------------------  From: Joanie Dillon CMA   To[facility]    Sent: 1/28/2022 11:03:08 AM CST  Subject: Medication Management     ** Submitted: **  Order:fluticasone-salmeterol (Advair Diskus 250 mcg-50 mcg inhalation powder)  1 puff(s)  Inhale  bid  Qty:  60 blister        Days Supply:  30        Refills:  0          Substitutions Allowed     Route To Pharmacy - [facility]    Signed by Joanie Dillon CMA  1/28/2022 5:02:00 PM [facility]    ** Submitted: **  Complete:fluticasone-salmeterol (Advair Diskus 250 mcg-50 mcg inhalation powder)   Signed by Joanie Dillon CMA  1/28/2022 5:02:00 PM [facility]    ** Not Approved:  **  fluticasone-salmeterol (Advair Diskus 250 mcg-50 mcg/dose powder for inhalation)  INHALE ONE PUFF TWICE DAILY  Qty:  60 blister        Days Supply:  30        Refills:  1          Substitutions Allowed     Route To [facility]   Note from Pharmacy:  This prescription was filled on 1/3/2022. Any refills authorized will be placed on file.  Signed by Joanie Dillon CMA          last visit 11/2021 chronic disease with CHT  RTC 4/2022  asthma assessment 8/2021      ------------------------------------------  From: [facility]  To: Matt ANNA [place]  Sent: January 27, 2022 8:02:57 AM CST  Subject: Medication Management  Due: January 21, 2022 11:05:10 AM CST     ** On Hold Pending Signature **     Drug: fluticasone-salmeterol (Advair Diskus 250 mcg-50 mcg inhalation powder), 1 puff(s) Inhale bid  Quantity: 60 EA  Days Supply: 0  Refills: 0  Substitutions Allowed  Notes from Pharmacy:     Dispensed Drug: fluticasone-salmeterol (Advair Diskus 250 mcg-50 mcg inhalation powder), INHALE ONE PUFF TWICE DAILY  Quantity: 60 blister  Days Supply:  30  Refills: 1  Substitutions Allowed  Notes from Pharmacy: This prescription was filled on 1/3/2022. Any refills authorized will be placed on file.  ------------------------------------------

## 2022-04-29 ENCOUNTER — LAB (OUTPATIENT)
Dept: LAB | Facility: CLINIC | Age: 42
End: 2022-04-29
Payer: COMMERCIAL

## 2022-04-29 DIAGNOSIS — E11.9 TYPE 2 DIABETES MELLITUS WITHOUT COMPLICATION, WITHOUT LONG-TERM CURRENT USE OF INSULIN (H): Primary | ICD-10-CM

## 2022-04-29 DIAGNOSIS — E11.9 TYPE 2 DIABETES MELLITUS WITHOUT COMPLICATION, WITHOUT LONG-TERM CURRENT USE OF INSULIN (H): ICD-10-CM

## 2022-04-29 PROBLEM — F32.0 MILD MAJOR DEPRESSION (H): Status: ACTIVE | Noted: 2022-04-29

## 2022-04-29 PROBLEM — E66.9 OBESITY: Status: ACTIVE | Noted: 2022-04-29

## 2022-04-29 PROBLEM — Z86.19 HISTORY OF VARICELLA: Status: ACTIVE | Noted: 2022-04-29

## 2022-04-29 LAB
ANION GAP SERPL CALCULATED.3IONS-SCNC: 5 MMOL/L (ref 3–14)
BUN SERPL-MCNC: 12 MG/DL (ref 7–30)
CALCIUM SERPL-MCNC: 9.1 MG/DL (ref 8.5–10.1)
CHLORIDE BLD-SCNC: 110 MMOL/L (ref 94–109)
CHOLEST SERPL-MCNC: 203 MG/DL
CO2 SERPL-SCNC: 26 MMOL/L (ref 20–32)
CREAT SERPL-MCNC: 0.76 MG/DL (ref 0.52–1.04)
CREAT UR-MCNC: 219 MG/DL
ERYTHROCYTE [DISTWIDTH] IN BLOOD BY AUTOMATED COUNT: 12.9 % (ref 10–15)
FASTING STATUS PATIENT QL REPORTED: ABNORMAL
GFR SERPL CREATININE-BSD FRML MDRD: >90 ML/MIN/1.73M2
GLUCOSE BLD-MCNC: 115 MG/DL (ref 70–99)
HBA1C MFR BLD: 5.7 % (ref 0–5.6)
HCT VFR BLD AUTO: 43.6 % (ref 35–47)
HDLC SERPL-MCNC: 57 MG/DL
HGB BLD-MCNC: 14.4 G/DL (ref 11.7–15.7)
LDLC SERPL CALC-MCNC: 121 MG/DL
MCH RBC QN AUTO: 29.8 PG (ref 26.5–33)
MCHC RBC AUTO-ENTMCNC: 33 G/DL (ref 31.5–36.5)
MCV RBC AUTO: 90 FL (ref 78–100)
MICROALBUMIN UR-MCNC: 7 MG/L
MICROALBUMIN/CREAT UR: 3.2 MG/G CR (ref 0–25)
NONHDLC SERPL-MCNC: 146 MG/DL
PLATELET # BLD AUTO: 349 10E3/UL (ref 150–450)
POTASSIUM BLD-SCNC: 4.1 MMOL/L (ref 3.4–5.3)
RBC # BLD AUTO: 4.83 10E6/UL (ref 3.8–5.2)
SODIUM SERPL-SCNC: 141 MMOL/L (ref 133–144)
TRIGL SERPL-MCNC: 125 MG/DL
WBC # BLD AUTO: 5.5 10E3/UL (ref 4–11)

## 2022-04-29 PROCEDURE — 82043 UR ALBUMIN QUANTITATIVE: CPT | Performed by: FAMILY MEDICINE

## 2022-04-29 PROCEDURE — 85027 COMPLETE CBC AUTOMATED: CPT | Performed by: FAMILY MEDICINE

## 2022-04-29 PROCEDURE — 36415 COLL VENOUS BLD VENIPUNCTURE: CPT | Performed by: FAMILY MEDICINE

## 2022-04-29 PROCEDURE — 83036 HEMOGLOBIN GLYCOSYLATED A1C: CPT | Performed by: FAMILY MEDICINE

## 2022-04-29 PROCEDURE — 80061 LIPID PANEL: CPT | Performed by: FAMILY MEDICINE

## 2022-04-29 PROCEDURE — 80048 BASIC METABOLIC PNL TOTAL CA: CPT | Performed by: FAMILY MEDICINE

## 2022-06-23 DIAGNOSIS — E11.9 TYPE 2 DIABETES MELLITUS WITHOUT COMPLICATIONS (H): ICD-10-CM

## 2022-06-24 NOTE — TELEPHONE ENCOUNTER
Routing refill request to provider for review/approval because:  Please advise on dose of Ozempic. Per Ruba, injecting 1mg weekly; refill request is 3mg. Pt is overdue for visit with Chey Adhikari.   A1c 4/29/22.    Last Written Prescription Date:  12/8/21 Ozempic injecting 1mg weekly.   Last Fill Quantity: 3ml,  # refills: 6   Last office visit: 11/11/21

## 2022-06-29 RX ORDER — SEMAGLUTIDE 1.34 MG/ML
INJECTION, SOLUTION SUBCUTANEOUS
Qty: 3 ML | Refills: 6 | Status: SHIPPED | OUTPATIENT
Start: 2022-06-29 | End: 2023-01-11

## 2022-11-16 ENCOUNTER — MYC MEDICAL ADVICE (OUTPATIENT)
Dept: FAMILY MEDICINE | Facility: CLINIC | Age: 42
End: 2022-11-16

## 2022-11-16 DIAGNOSIS — J45.21 INTERMITTENT ASTHMA WITH ACUTE EXACERBATION, UNSPECIFIED ASTHMA SEVERITY: Primary | ICD-10-CM

## 2022-11-18 ENCOUNTER — OFFICE VISIT (OUTPATIENT)
Dept: FAMILY MEDICINE | Facility: CLINIC | Age: 42
End: 2022-11-18
Payer: COMMERCIAL

## 2022-11-18 VITALS
SYSTOLIC BLOOD PRESSURE: 102 MMHG | DIASTOLIC BLOOD PRESSURE: 62 MMHG | OXYGEN SATURATION: 96 % | BODY MASS INDEX: 39.05 KG/M2 | WEIGHT: 205 LBS | TEMPERATURE: 98.2 F | RESPIRATION RATE: 20 BRPM | HEART RATE: 87 BPM

## 2022-11-18 DIAGNOSIS — E66.01 MORBID OBESITY (H): ICD-10-CM

## 2022-11-18 DIAGNOSIS — J45.41 MODERATE PERSISTENT ASTHMA WITH ACUTE EXACERBATION: Primary | ICD-10-CM

## 2022-11-18 PROCEDURE — 99214 OFFICE O/P EST MOD 30 MIN: CPT | Performed by: FAMILY MEDICINE

## 2022-11-18 RX ORDER — FLUTICASONE PROPIONATE AND SALMETEROL 100; 50 UG/1; UG/1
1 POWDER RESPIRATORY (INHALATION) EVERY 12 HOURS
Qty: 60 EACH | Refills: 1 | Status: SHIPPED | OUTPATIENT
Start: 2022-11-18 | End: 2023-01-09

## 2022-11-18 RX ORDER — PREDNISONE 10 MG/1
TABLET ORAL
Qty: 42 TABLET | Refills: 0 | Status: SHIPPED | OUTPATIENT
Start: 2022-11-18 | End: 2022-12-08

## 2022-11-18 RX ORDER — FLUTICASONE PROPIONATE AND SALMETEROL 250; 50 UG/1; UG/1
1 POWDER RESPIRATORY (INHALATION) DAILY
COMMUNITY
Start: 2022-01-28 | End: 2022-12-02

## 2022-11-18 RX ORDER — ALBUTEROL SULFATE 90 UG/1
2 AEROSOL, METERED RESPIRATORY (INHALATION) EVERY 4 HOURS PRN
COMMUNITY
Start: 2022-11-09 | End: 2023-01-27

## 2022-11-18 ASSESSMENT — PATIENT HEALTH QUESTIONNAIRE - PHQ9
10. IF YOU CHECKED OFF ANY PROBLEMS, HOW DIFFICULT HAVE THESE PROBLEMS MADE IT FOR YOU TO DO YOUR WORK, TAKE CARE OF THINGS AT HOME, OR GET ALONG WITH OTHER PEOPLE: NOT DIFFICULT AT ALL
SUM OF ALL RESPONSES TO PHQ QUESTIONS 1-9: 1
SUM OF ALL RESPONSES TO PHQ QUESTIONS 1-9: 1

## 2022-11-18 ASSESSMENT — ASTHMA QUESTIONNAIRES
QUESTION_1 LAST FOUR WEEKS HOW MUCH OF THE TIME DID YOUR ASTHMA KEEP YOU FROM GETTING AS MUCH DONE AT WORK, SCHOOL OR AT HOME: NONE OF THE TIME
EMERGENCY_ROOM_LAST_YEAR_TOTAL: TWO
QUESTION_4 LAST FOUR WEEKS HOW OFTEN HAVE YOU USED YOUR RESCUE INHALER OR NEBULIZER MEDICATION (SUCH AS ALBUTEROL): ONE OR TWO TIMES PER DAY
QUESTION_3 LAST FOUR WEEKS HOW OFTEN DID YOUR ASTHMA SYMPTOMS (WHEEZING, COUGHING, SHORTNESS OF BREATH, CHEST TIGHTNESS OR PAIN) WAKE YOU UP AT NIGHT OR EARLIER THAN USUAL IN THE MORNING: ONCE OR TWICE
ACT_TOTALSCORE: 13
QUESTION_5 LAST FOUR WEEKS HOW WOULD YOU RATE YOUR ASTHMA CONTROL: NOT CONTROLLED AT ALL
QUESTION_2 LAST FOUR WEEKS HOW OFTEN HAVE YOU HAD SHORTNESS OF BREATH: MORE THAN ONCE A DAY
ACT_TOTALSCORE: 13

## 2022-11-18 NOTE — PROGRESS NOTES
"  Assessment & Plan     Moderate persistent asthma with acute exacerbation    - fluticasone-salmeterol (ADVAIR) 100-50 MCG/ACT inhaler; Inhale 1 puff into the lungs every 12 hours  - predniSONE (DELTASONE) 10 MG tablet; Take 4 tablets (40 mg) by mouth daily for 4 days, THEN 3 tablets (30 mg) daily for 4 days, THEN 2 tablets (20 mg) daily for 4 days, THEN 1 tablet (10 mg) daily for 4 days, THEN 0.5 tablets (5 mg) daily for 4 days.    Morbid obesity (H)  Discussed diet and exercise     MED REC REQUIRED  Post Medication Reconciliation Status:       BMI:   Estimated body mass index is 39.05 kg/m  as calculated from the following:    Height as of 1/19/22: 1.543 m (5' 0.75\").    Weight as of this encounter: 93 kg (205 lb).   Weight management plan: Discussed healthy diet and exercise guidelines    Return in about 2 weeks (around 12/2/2022).    Inocente Gutierrez MD  [facility]    Ragini Ling is a 42 year old, presenting for the following health issues:  FU After ER Visit      History of Present Illness     Asthma:  She presents for follow up of asthma.  She has some cough, no wheezing, and no shortness of breath. She is using a relief medication daily. She typically misses taking her controller medication 7 time(s) per week.Patient is aware of the following triggers: unaware of any triggers. The patient has had a visit to the Emergency Room, Urgent Care or Hospital due to asthma since the last clinic visit. She has been to the Emergency Room or Urgent Care 2 times.She has had a Hospitalization 0 times.    She eats 0-1 servings of fruits and vegetables daily.She consumes 1 sweetened beverage(s) daily.She exercises with enough effort to increase her heart rate 30 to 60 minutes per day.  She exercises with enough effort to increase her heart rate 3 or less days per week. She is missing 7 dose(s) of medications per week.    Today's PHQ-9         PHQ-9 Total Score: 1    PHQ-9 Q9 " Thoughts of better off dead/self-harm past 2 weeks :   Not at all    How difficult have these problems made it for you to do your work, take care of things at home, or get along with other people: Not difficult at all       Asthma Follow-Up    Was ACT completed today?    Yes    ACT Total Scores 11/18/2022   ACT TOTAL SCORE (Goal Greater than or Equal to 20) 13   In the past 12 months, how many times did you visit the emergency room for your asthma without being admitted to the hospital? 2   In the past 12 months, how many times were you hospitalized overnight because of your asthma? 0         How many days per week do you miss taking your asthma controller medication?  0    Please describe any recent triggers for your asthma: exercise or sports    Have you had any Emergency Room Visits, Urgent Care Visits, or Hospital Admissions since your last office visit?  Yes  Number of ER or Urgent Care visits for asthma: 2        Review of Systems   CONSTITUTIONAL:NEGATIVE for fever, chills, change in weight  RESP:NEGATIVE for significant cough or SOB and POSITIVE for cough-non productive and wheezing  CV: NEGATIVE for chest pain, palpitations or peripheral edema  MUSCULOSKELETAL: NEGATIVE for significant arthralgias or myalgia      Objective    /62 (BP Location: Right arm, Patient Position: Sitting)   Pulse 87   Temp 98.2  F (36.8  C) (Tympanic)   Resp 20   Wt 93 kg (205 lb)   SpO2 96%   BMI 39.05 kg/m    Body mass index is 39.05 kg/m .  Physical Exam   GENERAL: healthy, alert and no distress  NECK: no adenopathy, no asymmetry, masses, or scars and thyroid normal to palpation  RESP: lungs clear to auscultation - no rales, rhonchi or wheezes, expiratory wheezes throughout and decreased breath sounds throughout  CV: regular rate and rhythm, normal S1 S2, no S3 or S4, no murmur, click or rub, no peripheral edema and peripheral pulses strong  ABDOMEN: soft, nontender, no hepatosplenomegaly, no masses and bowel sounds  normal  MS: no gross musculoskeletal defects noted, no edema

## 2022-11-21 DIAGNOSIS — J45.909 ASTHMA: Primary | ICD-10-CM

## 2022-12-02 ENCOUNTER — OFFICE VISIT (OUTPATIENT)
Dept: FAMILY MEDICINE | Facility: CLINIC | Age: 42
End: 2022-12-02
Payer: COMMERCIAL

## 2022-12-02 VITALS
BODY MASS INDEX: 39.43 KG/M2 | WEIGHT: 207 LBS | RESPIRATION RATE: 20 BRPM | DIASTOLIC BLOOD PRESSURE: 70 MMHG | OXYGEN SATURATION: 96 % | SYSTOLIC BLOOD PRESSURE: 108 MMHG | HEART RATE: 79 BPM

## 2022-12-02 DIAGNOSIS — J45.41 MODERATE PERSISTENT ASTHMA WITH ACUTE EXACERBATION: ICD-10-CM

## 2022-12-02 DIAGNOSIS — E78.00 PURE HYPERCHOLESTEROLEMIA: ICD-10-CM

## 2022-12-02 DIAGNOSIS — E11.9 TYPE 2 DIABETES MELLITUS WITHOUT COMPLICATION, WITHOUT LONG-TERM CURRENT USE OF INSULIN (H): Primary | ICD-10-CM

## 2022-12-02 LAB — HBA1C MFR BLD: 6.5 % (ref 0–5.6)

## 2022-12-02 PROCEDURE — 36415 COLL VENOUS BLD VENIPUNCTURE: CPT | Performed by: FAMILY MEDICINE

## 2022-12-02 PROCEDURE — 99214 OFFICE O/P EST MOD 30 MIN: CPT | Performed by: FAMILY MEDICINE

## 2022-12-02 PROCEDURE — 83036 HEMOGLOBIN GLYCOSYLATED A1C: CPT | Performed by: FAMILY MEDICINE

## 2022-12-02 RX ORDER — ATORVASTATIN CALCIUM 20 MG/1
20 TABLET, FILM COATED ORAL DAILY
Qty: 90 TABLET | Refills: 3 | Status: SHIPPED | OUTPATIENT
Start: 2022-12-02 | End: 2023-03-22

## 2022-12-02 ASSESSMENT — ASTHMA QUESTIONNAIRES
QUESTION_4 LAST FOUR WEEKS HOW OFTEN HAVE YOU USED YOUR RESCUE INHALER OR NEBULIZER MEDICATION (SUCH AS ALBUTEROL): ONE OR TWO TIMES PER DAY
QUESTION_5 LAST FOUR WEEKS HOW WOULD YOU RATE YOUR ASTHMA CONTROL: NOT CONTROLLED AT ALL
ACT_TOTALSCORE: 11
QUESTION_2 LAST FOUR WEEKS HOW OFTEN HAVE YOU HAD SHORTNESS OF BREATH: MORE THAN ONCE A DAY
QUESTION_1 LAST FOUR WEEKS HOW MUCH OF THE TIME DID YOUR ASTHMA KEEP YOU FROM GETTING AS MUCH DONE AT WORK, SCHOOL OR AT HOME: SOME OF THE TIME
ACT_TOTALSCORE: 11
EMERGENCY_ROOM_LAST_YEAR_TOTAL: TWO
QUESTION_3 LAST FOUR WEEKS HOW OFTEN DID YOUR ASTHMA SYMPTOMS (WHEEZING, COUGHING, SHORTNESS OF BREATH, CHEST TIGHTNESS OR PAIN) WAKE YOU UP AT NIGHT OR EARLIER THAN USUAL IN THE MORNING: ONCE OR TWICE

## 2022-12-02 ASSESSMENT — ENCOUNTER SYMPTOMS
WHEEZING: 1
SHORTNESS OF BREATH: 1

## 2022-12-02 NOTE — PROGRESS NOTES
Assessment & Plan     Type 2 diabetes mellitus without complication, without long-term current use of insulin (H)  Doing well  - Hemoglobin A1c; Future    Moderate persistent asthma with acute exacerbation  Advised how to take inhalers    Pure hypercholesterolemia  Added  Atorvastatin    Return in about 4 weeks (around 12/30/2022).    Inocente Gutierrez MD  [facility]    Ragini Ling is a 42 year old, presenting for the following health issues:  Asthma (Follow up)      History of Present Illness     Asthma:  She presents for follow up of asthma.  She has no cough, some wheezing, and some shortness of breath. She is using a relief medication 2-3 times per day. She does not miss any doses of her controller medication throughout the week.Patient is aware of the following triggers: same as previous visit. The patient has not had a visit to the Emergency Room, Urgent Care or Hospital due to asthma since the last clinic visit.     She eats 2-3 servings of fruits and vegetables daily.She consumes 1 sweetened beverage(s) daily.She exercises with enough effort to increase her heart rate 10 to 19 minutes per day.  She exercises with enough effort to increase her heart rate 3 or less days per week.   She is taking medications regularly.       Asthma Follow-Up    Was ACT completed today?    Yes    ACT Total Scores 12/2/2022   ACT TOTAL SCORE (Goal Greater than or Equal to 20) 11   In the past 12 months, how many times did you visit the emergency room for your asthma without being admitted to the hospital? 2   In the past 12 months, how many times were you hospitalized overnight because of your asthma? 0         How many days per week do you miss taking your asthma controller medication?  0    Please describe any recent triggers for your asthma:     Have you had any Emergency Room Visits, Urgent Care Visits, or Hospital Admissions since your last office visit?  No    Diabetes  Follow-up      How often are you checking your blood sugar? Not at all    What concerns do you have today about your diabetes? None     Do you have any of these symptoms? (Select all that apply)  No numbness or tingling in feet.  No redness, sores or blisters on feet.  No complaints of excessive thirst.  No reports of blurry vision.  No significant changes to weight.    Have you had a diabetic eye exam in the last 12 months? No        BP Readings from Last 2 Encounters:   12/02/22 108/70   11/18/22 102/62     Hemoglobin A1C   Date Value   04/29/2022 5.7 % (H)   09/30/2021 7.3 (H)     LDL Cholesterol Calculated   Date Value   04/29/2022 121 mg/dL (H)   09/30/2021 116 (H)                 Review of Systems   Respiratory: Positive for shortness of breath and wheezing.             Objective    /70 (BP Location: Right arm, Patient Position: Sitting)   Pulse 79   Resp 20   Wt 93.9 kg (207 lb)   SpO2 96%   BMI 39.43 kg/m    Body mass index is 39.43 kg/m .  Physical Exam   GENERAL: healthy, alert and no distress  NECK: no adenopathy, no asymmetry, masses, or scars and thyroid normal to palpation  RESP: lungs clear to auscultation - no rales, rhonchi or wheezes and expiratory wheezes throughout  CV: regular rate and rhythm, normal S1 S2, no S3 or S4, no murmur, click or rub, no peripheral edema and peripheral pulses strong  ABDOMEN: soft, nontender, no hepatosplenomegaly, no masses and bowel sounds normal  MS: no gross musculoskeletal defects noted, no edema

## 2022-12-24 ENCOUNTER — HEALTH MAINTENANCE LETTER (OUTPATIENT)
Age: 42
End: 2022-12-24

## 2023-01-06 DIAGNOSIS — J45.41 MODERATE PERSISTENT ASTHMA WITH ACUTE EXACERBATION: ICD-10-CM

## 2023-01-09 RX ORDER — CEPHALEXIN 250 MG/1
CAPSULE ORAL
Qty: 3 EACH | Refills: 3 | Status: SHIPPED | OUTPATIENT
Start: 2023-01-09 | End: 2023-01-10

## 2023-01-09 NOTE — TELEPHONE ENCOUNTER
"      Last office visit: 12/2/2022     Future Appointments 1/9/2023 - 7/8/2023      Date Visit Type Length Department Provider     1/10/2023  3:30 PM OFFICE VISIT 30 min [facility] FP/IM/Inocente Walsh MD    Location Instructions:     [facility], one block north of [facility] and the [facility]. The clinic shares a building with the [facility]; use the clinic s parking lot and entrance on the building s south side.               1/27/2023  8:00 AM PULMONARY FUNCTION TEST 60 min [facility] PULMONOLOGY [facility] PFT RM 1              1/27/2023  9:00 AM NEW 60 min [facility] PULMONOLOGY Margarita Mack MD                   Requested Prescriptions   Pending Prescriptions Disp Refills     ADVAIR DISKUS 100-50 MCG/ACT inhaler [Pharmacy Med Name: Advair Diskus 100 mcg-50 mcg/dose powder for inhalation]  1     Sig: Inhale 1 puff into the lungs every 12 hours       Inhaled Steroids Protocol Failed - 1/6/2023  8:03 AM        Failed - Asthma control assessment score within normal limits in last 6 months     Please review ACT score.           Passed - Patient is age 12 or older        Passed - Medication is active on med list        Passed - Recent (6 mo) or future (30 days) visit within the authorizing provider's specialty     Patient had office visit in the last 6 months or has a visit in the next 30 days with authorizing provider or within the authorizing provider's specialty.  See \"Patient Info\" tab in inbasket, or \"Choose Columns\" in Meds & Orders section of the refill encounter.           Long-Acting Beta Agonist Inhalers Protocol  Failed - 1/6/2023  8:03 AM        Failed - Asthma control assessment score within normal limits in last 6 months     Please review ACT score.           Passed - Patient is age 12 or older        Passed - Medication is active on med list        Passed - Recent (6 mo) or future (30 days) visit within " "the authorizing provider's specialty     Patient had office visit in the last 6 months or has a visit in the next 30 days with authorizing provider or within the authorizing provider's specialty.  See \"Patient Info\" tab in inbasket, or \"Choose Columns\" in Meds & Orders section of the refill encounter.                       "

## 2023-01-10 ENCOUNTER — OFFICE VISIT (OUTPATIENT)
Dept: FAMILY MEDICINE | Facility: CLINIC | Age: 43
End: 2023-01-10
Payer: COMMERCIAL

## 2023-01-10 VITALS
RESPIRATION RATE: 20 BRPM | TEMPERATURE: 97.8 F | HEART RATE: 74 BPM | SYSTOLIC BLOOD PRESSURE: 96 MMHG | DIASTOLIC BLOOD PRESSURE: 52 MMHG | OXYGEN SATURATION: 98 %

## 2023-01-10 DIAGNOSIS — J45.41 MODERATE PERSISTENT ASTHMA WITH ACUTE EXACERBATION: Primary | ICD-10-CM

## 2023-01-10 DIAGNOSIS — E11.9 TYPE 2 DIABETES MELLITUS WITHOUT COMPLICATIONS (H): ICD-10-CM

## 2023-01-10 DIAGNOSIS — F32.0 MILD MAJOR DEPRESSION (H): ICD-10-CM

## 2023-01-10 DIAGNOSIS — E11.9 TYPE 2 DIABETES MELLITUS WITHOUT COMPLICATION, WITHOUT LONG-TERM CURRENT USE OF INSULIN (H): ICD-10-CM

## 2023-01-10 DIAGNOSIS — E66.01 MORBID OBESITY (H): ICD-10-CM

## 2023-01-10 PROCEDURE — 99214 OFFICE O/P EST MOD 30 MIN: CPT | Performed by: FAMILY MEDICINE

## 2023-01-10 RX ORDER — IMIPRAMINE HYDROCHLORIDE 25 MG/1
TABLET ORAL
COMMUNITY
Start: 2022-11-09 | End: 2023-11-16

## 2023-01-10 RX ORDER — FLUTICASONE PROPIONATE AND SALMETEROL 250; 50 UG/1; UG/1
1 POWDER RESPIRATORY (INHALATION) EVERY 12 HOURS
Qty: 3 EACH | Refills: 3 | Status: SHIPPED | OUTPATIENT
Start: 2023-02-10 | End: 2023-03-22

## 2023-01-10 ASSESSMENT — ASTHMA QUESTIONNAIRES
QUESTION_2 LAST FOUR WEEKS HOW OFTEN HAVE YOU HAD SHORTNESS OF BREATH: NOT AT ALL
QUESTION_1 LAST FOUR WEEKS HOW MUCH OF THE TIME DID YOUR ASTHMA KEEP YOU FROM GETTING AS MUCH DONE AT WORK, SCHOOL OR AT HOME: NONE OF THE TIME
EMERGENCY_ROOM_LAST_YEAR_TOTAL: ONE
QUESTION_4 LAST FOUR WEEKS HOW OFTEN HAVE YOU USED YOUR RESCUE INHALER OR NEBULIZER MEDICATION (SUCH AS ALBUTEROL): ONE OR TWO TIMES PER DAY
QUESTION_3 LAST FOUR WEEKS HOW OFTEN DID YOUR ASTHMA SYMPTOMS (WHEEZING, COUGHING, SHORTNESS OF BREATH, CHEST TIGHTNESS OR PAIN) WAKE YOU UP AT NIGHT OR EARLIER THAN USUAL IN THE MORNING: NOT AT ALL
ACT_TOTALSCORE: 19
QUESTION_5 LAST FOUR WEEKS HOW WOULD YOU RATE YOUR ASTHMA CONTROL: POORLY CONTROLLED
ACT_TOTALSCORE: 19

## 2023-01-10 NOTE — PROGRESS NOTES
Assessment & Plan     Mild major depression (H)  Stable controlled    Morbid obesity (H)  unchanged    Type 2 diabetes mellitus without complication, without long-term current use of insulin (H)  Controlled by prescription medication prescribed by me and up to date.      Moderate persistent asthma with acute exacerbation  No controlled enough  - fluticasone-salmeterol (ADVAIR) 250-50 MCG/ACT inhaler; Inhale 1 puff into the lungs every 12 hours  - Nebulizer and Supplies Order    Return in about 3 months (around 4/10/2023) for Follow up.    Inocente Gutierrez MD  [facility]    Ragini Varma is a 42 year old, presenting for the following health issues:  Asthma      History of Present Illness     Asthma:  She presents for follow up of asthma.  She has some cough, some wheezing, and no shortness of breath. She is using a relief medication daily. She does not miss any doses of her controller medication throughout the week.Patient is aware of the following triggers: exercise or sports. The patient has not had a visit to the Emergency Room, Urgent Care or Hospital due to asthma since the last clinic visit.     She eats 0-1 servings of fruits and vegetables daily.She consumes 1 sweetened beverage(s) daily.She exercises with enough effort to increase her heart rate 10 to 19 minutes per day.  She exercises with enough effort to increase her heart rate 3 or less days per week.   She is taking medications regularly.       Asthma Follow-Up    Was ACT completed today?    Yes    ACT Total Scores 1/10/2023   ACT TOTAL SCORE (Goal Greater than or Equal to 20) 19   In the past 12 months, how many times did you visit the emergency room for your asthma without being admitted to the hospital? 1   In the past 12 months, how many times were you hospitalized overnight because of your asthma? 0       How many days per week do you miss taking your asthma controller medication?  0    Please describe any  recent triggers for your asthma: exercise or sports    Have you had any Emergency Room Visits, Urgent Care Visits, or Hospital Admissions since your last office visit?  No    Asthma Follow-Up    Was ACT completed today?    Yes    ACT Total Scores 1/10/2023   ACT TOTAL SCORE (Goal Greater than or Equal to 20) 19   In the past 12 months, how many times did you visit the emergency room for your asthma without being admitted to the hospital? 1   In the past 12 months, how many times were you hospitalized overnight because of your asthma? 0       How many days per week do you miss taking your asthma controller medication?  0    Please describe any recent triggers for your asthma: exercise or sports    Have you had any Emergency Room Visits, Urgent Care Visits, or Hospital Admissions since your last office visit?  Yes  Number of ER or Urgent Care visits for asthma: 1    Diabetes Follow-up    How often are you checking your blood sugar? A few times a week  What time of day are you checking your blood sugars (select all that apply)?  Before and after meals  Have you had any blood sugars above 200?  No  Have you had any blood sugars below 70?  No    BP Readings from Last 2 Encounters:   01/10/23 96/52   12/02/22 108/70     Hemoglobin A1C (%)   Date Value   12/02/2022 6.5 (H)   04/29/2022 5.7 (H)     LDL Cholesterol Calculated   Date Value   04/29/2022 121 mg/dL (H)   09/30/2021 116 (H)         Depression Followup    How are you doing with your depression since your last visit? No change    Are you having other symptoms that might be associated with depression? No    Have you had a significant life event?  OTHER: liffe     Are you feeling anxious or having panic attacks?   No    Do you have any concerns with your use of alcohol or other drugs? No    Social History     Tobacco Use     Smoking status: Never     Smokeless tobacco: Never   Vaping Use     Vaping Use: Never used     PHQ 11/18/2022   PHQ-9 Total Score 1   Q9:  Thoughts of better off dead/self-harm past 2 weeks Not at all     No flowsheet data found.      Suicide Assessment Five-step Evaluation and Treatment (SAFE-T)        Review of Systems   Constitutional, HEENT, cardiovascular, pulmonary, gi and gu systems are negative, except as otherwise noted.      Objective    BP 96/52 (BP Location: Right arm, Patient Position: Sitting)   Pulse 74   Temp 97.8  F (36.6  C) (Tympanic)   Resp 20   SpO2 98%   There is no height or weight on file to calculate BMI.  Physical Exam   GENERAL: healthy, alert and no distress  NECK: no adenopathy, no asymmetry, masses, or scars and thyroid normal to palpation  RESP: lungs clear to auscultation - no rales, rhonchi or wheezes and expiratory wheezes throughout  CV: regular rate and rhythm, normal S1 S2, no S3 or S4, no murmur, click or rub, no peripheral edema and peripheral pulses strong  ABDOMEN: soft, nontender, no hepatosplenomegaly, no masses and bowel sounds normal  MS: no gross musculoskeletal defects noted, no edema

## 2023-01-10 NOTE — PROGRESS NOTES
DME (Durable Medical Equipment) Orders and Documentation  Orders Placed This Encounter   Procedures     Nebulizer and Supplies Order      The patient was assessed and it was determined the patient is in need of the following listed DME Supplies/Equipment. Please complete supporting documentation below to demonstrate medical necessity.      Nebulizer Documentation  The patient was seen 01/10/2023. After assessment, the patient will need to be treated with ongoing nebulizer for treatment/management of moderate persistent asthma.

## 2023-01-11 RX ORDER — SEMAGLUTIDE 1.34 MG/ML
INJECTION, SOLUTION SUBCUTANEOUS
Qty: 3 ML | Refills: 6 | Status: SHIPPED | OUTPATIENT
Start: 2023-01-11 | End: 2023-05-24

## 2023-01-11 NOTE — TELEPHONE ENCOUNTER
Prescription approved per [facility] Refill Protocol.    Last Written Prescription Date: 6/29/22  Last Fill Quantity: 3ml,  # refills: 6   Last office visit: 12/2/2022 with prescribing provider

## 2023-01-27 ENCOUNTER — OFFICE VISIT (OUTPATIENT)
Dept: PULMONOLOGY | Facility: CLINIC | Age: 43
End: 2023-01-27
Attending: FAMILY MEDICINE
Payer: COMMERCIAL

## 2023-01-27 ENCOUNTER — ALLIED HEALTH/NURSE VISIT (OUTPATIENT)
Dept: PULMONOLOGY | Facility: CLINIC | Age: 43
End: 2023-01-27
Attending: INTERNAL MEDICINE
Payer: COMMERCIAL

## 2023-01-27 VITALS
OXYGEN SATURATION: 98 % | HEART RATE: 90 BPM | DIASTOLIC BLOOD PRESSURE: 82 MMHG | HEIGHT: 61 IN | WEIGHT: 209 LBS | BODY MASS INDEX: 39.46 KG/M2 | SYSTOLIC BLOOD PRESSURE: 118 MMHG

## 2023-01-27 DIAGNOSIS — J30.89 SEASONAL ALLERGIC RHINITIS DUE TO OTHER ALLERGIC TRIGGER: Primary | ICD-10-CM

## 2023-01-27 DIAGNOSIS — J45.40 MODERATE PERSISTENT ASTHMA WITHOUT COMPLICATION: ICD-10-CM

## 2023-01-27 DIAGNOSIS — J45.909 ASTHMA: ICD-10-CM

## 2023-01-27 DIAGNOSIS — R06.2 INSPIRATORY WHEEZE ON EXAMINATION: ICD-10-CM

## 2023-01-27 LAB — HGB BLD-MCNC: 14.5 G/DL

## 2023-01-27 PROCEDURE — 94729 DIFFUSING CAPACITY: CPT | Performed by: INTERNAL MEDICINE

## 2023-01-27 PROCEDURE — 94060 EVALUATION OF WHEEZING: CPT | Performed by: INTERNAL MEDICINE

## 2023-01-27 PROCEDURE — 85018 HEMOGLOBIN: CPT

## 2023-01-27 PROCEDURE — 99204 OFFICE O/P NEW MOD 45 MIN: CPT | Performed by: INTERNAL MEDICINE

## 2023-01-27 PROCEDURE — 94726 PLETHYSMOGRAPHY LUNG VOLUMES: CPT | Performed by: INTERNAL MEDICINE

## 2023-01-27 RX ORDER — MONTELUKAST SODIUM 10 MG/1
10 TABLET ORAL AT BEDTIME
Qty: 30 TABLET | Refills: 11 | Status: SHIPPED | OUTPATIENT
Start: 2023-01-27 | End: 2023-03-22

## 2023-01-27 RX ORDER — ALBUTEROL SULFATE 90 UG/1
2 AEROSOL, METERED RESPIRATORY (INHALATION) EVERY 4 HOURS PRN
Qty: 18 G | Refills: 11 | Status: ON HOLD | OUTPATIENT
Start: 2023-01-27 | End: 2023-04-19

## 2023-01-27 RX ORDER — BUDESONIDE AND FORMOTEROL FUMARATE DIHYDRATE 160; 4.5 UG/1; UG/1
2 AEROSOL RESPIRATORY (INHALATION) 2 TIMES DAILY
Qty: 6 G | Refills: 11 | Status: SHIPPED | OUTPATIENT
Start: 2023-01-27 | End: 2023-11-16

## 2023-01-27 NOTE — PROGRESS NOTES
Pulmonary Clinic Follow-Up          Assessment/Plan:     42 year old female with a history of asthma, seasonal allergies, DM II - presenting to establish care and address ongoing cough, wheezing, SOB.      Moderate persistent asthma  Pulmonary function testing without obstructive or restrictive process, DLCO elevated.  Worsening cough, wheezing, SOB over past 5 years.  Worsening seasonal allergies as well.  ACT score 19, improved from 11 in December.  Currently on Advair 250, recently increased 2 weeks ago.  Plan:  - Finish Advair inhaler twice daily.  After this is finished, switch to Symbicort 160/4.5 two puffs BID, rinse mouth after use.  - continue albuterol PRN  - need to control allergies, see below  - use peak flow meter, bring numbers to next visit  - she is due for annual influenza and COVID bivalent booster, recommended these today, she will look into this.    Seasonal allergies  No hx of allergies, but has had worsening itchy eyes, sinus congestion, cough, wheezing.  Worse in spring.  Moved into older house 5 years ago, symptoms have worsened since then.  Plan:  - allergy referral  - start singulair 10mg daily  - start flonase  - trial saline rinses/spray    Flattening of inspiratory flow loop  Noted per PFTs today.  She has had symptoms of tightening in throat.  Inspiratory wheezes noted per exam today, as well as stridor in upper airway.  No hx of surgeries, intubation.  Concern for vocal cord dysfunction.  Plan:  - referral to ENT  - high res chest CT    Follow-up  - one month      Thais Horton CNP  [facility]  [phone]       CC:     Asthma     HPI:     42 year old female with a history of asthma, seasonal allergies, DM II - presenting to establish care and address ongoing cough, wheezing, SOB.      She reports having pneumonia about 5 years ago, was quite ill.  Since that time, has had worsening cough, wheezing, SOB.  She also had COVID in spring of 2021,  wheezing and cough continues to worsen since that time.    Trialed flovent in 2021, no improvement.    She was seen in urgent care in 7/2022 after staying [facility], was treated w/prednisone at that time.  Seen in the ED in [place] on 11/9/22 with asthma exacerbation triggered by URI.  Improved w/duoneb, sent home with prednisone burst x4 days.  At visit with PCP, she noted no improvement, so PCP put on 21 days of prednisone.  She did feel improved after this.  Prescribed advair 150 by PCP beginning of December, but she was only taking daily.  At follow-up, still had MORALES and wheezing, maybe little better so PCP increased to 250, which she has been on x2 weeks.  Symptoms a little better.     Hx of seasonal allergies in mother, asthma in daughters.    She endorses worsening seasonal allergies past few years - sneezing, itchy eyes, sinus congestion, mostly in spring.  Only trialed claritin intermittently for this.  5 years ago she did move into her great grandmothers house.  They do change filter in furnace frequently, but she is unsure on if there are issues with mold or damp basement.    Denies issues with GERD.      ACT Total Scores 11/18/2022 12/2/2022 1/10/2023   ACT TOTAL SCORE (Goal Greater than or Equal to 20) 13 11 19   In the past 12 months, how many times did you visit the emergency room for your asthma without being admitted to the hospital? 2 2 1   In the past 12 months, how many times were you hospitalized overnight because of your asthma? 0 0 0       Medical records reviewed for this visit include ED provider notes, UC notes, PCP notes.     ROS:     A 12-system review was obtained and was negative with the exception of the symptoms endorsed in the HPI.       Medical hx:       PMH:  Past Medical History:   Diagnosis Date     Other and unspecified ovarian cyst 9/23/98    ultrasound done       PSH:  No past surgical history on file.      Allergies:  Allergies   Allergen Reactions     No Known Drug  "Allergies        Family Hx:  No family history on file.    Social Hx:  Social History     Socioeconomic History     Marital status:      Spouse name: Not on file     Number of children: Not on file     Years of education: Not on file     Highest education level: Not on file   Occupational History     Not on file   Tobacco Use     Smoking status: Never     Smokeless tobacco: Never   Vaping Use     Vaping Use: Never used   Substance and Sexual Activity     Alcohol use: Not on file     Drug use: Not on file     Sexual activity: Not on file   Other Topics Concern     Not on file   Social History Narrative     Not on file     Social Determinants of Health     Financial Resource Strain: Not on file   Food Insecurity: Not on file   Transportation Needs: Not on file   Physical Activity: Not on file   Stress: Not on file   Social Connections: Not on file   Intimate Partner Violence: Not on file   Housing Stability: Not on file       Current Meds:  Current Outpatient Medications   Medication Sig Dispense Refill     albuterol (PROAIR HFA/PROVENTIL HFA/VENTOLIN HFA) 108 (90 Base) MCG/ACT inhaler Inhale 2 puffs into the lungs every 4 hours as needed for shortness of breath or wheezing 18 g 11     atorvastatin (LIPITOR) 20 MG tablet Take 1 tablet (20 mg) by mouth daily 90 tablet 3     budesonide-formoterol (SYMBICORT) 160-4.5 MCG/ACT Inhaler Inhale 2 puffs into the lungs 2 times daily 6 g 11     [START ON 2/10/2023] fluticasone-salmeterol (ADVAIR) 250-50 MCG/ACT inhaler Inhale 1 puff into the lungs every 12 hours 3 each 3     montelukast (SINGULAIR) 10 MG tablet Take 1 tablet (10 mg) by mouth At Bedtime 30 tablet 11     OZEMPIC, 1 MG/DOSE, 4 MG/3ML SOPN INJECT 1MG SUBCUTANEOUSLY EVERY WEEK *rotate injection sites* 3 mL 6     Spacer/Aero-Holding Chambers (AEROCHAMBER WITH MOUTHPIECE) MISC USE AS DIRECTED WITH VENTOLIN INHALER            Physical Exam:     /82 (BP Location: Left arm)   Pulse 90   Ht 1.552 m (5' 1.1\") "   Wt 94.8 kg (209 lb)   SpO2 98%   BMI 39.36 kg/m    Gen: adult female, appears in NAD  HEENT: slight erythema to bilateral conjunctivae.    CV: RRR, no M/G/R  Resp:  Expiratory and inspiratory wheezes throughout.  Slight stridor noted in upper airway.   Respirations even and unlabored.  On RA.   Skin: no apparent rashes on visible skin  Ext: no cyanosis, clubbing or edema  Neuro: alert and answering questions appropriately       Data:     Labs:  Reviewed, no eosinophils on file    Imaging studies:  I have personally reviewed all pertinent imaging studies and PFT results unless otherwise noted.    EXAM: CT CHEST PE STUDY  LOCATION: [facility] MEDICAL IMAGING  DATE/TIME: 9/7/2021   INDICATION: Sob (shortness Of Breath)  COMPARISON: None.  FINDINGS:  ANGIOGRAM CHEST: Pulmonary arteries are normal caliber and negative for pulmonary emboli. Thoracic aorta is negative for dissection.  LUNGS AND PLEURA: Normal.  MEDIASTINUM/AXILLAE: Normal.  CORONARY ARTERY CALCIFICATION: None.  UPPER ABDOMEN: Normal.  MUSCULOSKELETAL: Benign bone island left humeral head. Minimal degenerative change thoracic spine.    IMPRESSION:  No findings to account for shortness of breath. Negative for PE.       Pulmonary Function Testing 1/27/23:  FEV1/FVC is 83% and is normal.  FEV1 is 3.22 L (108% predicted) and is normal.  FVC is 3.22 L (108% predicted) and normal.  There was no improvement in spirometry after a single inhaled dose of bronchodilator.  TLC is 4.56 L (102% predicted) and is normal.  RV is 1.30 L (85% predicted) and is normal.  DLCO is 124% predicted and is normal when it is corrected for hemoglobin.  Flow volume loops indicate flattening of the inspiratory limb.

## 2023-01-28 LAB
DLCOCOR-%PRED-PRE: 124 %
DLCOCOR-PRE: 24.18 ML/MIN/MMHG
DLCOUNC-%PRED-PRE: 128 %
DLCOUNC-PRE: 24.96 ML/MIN/MMHG
DLCOUNC-PRED: 19.49 ML/MIN/MMHG
ERV-%PRED-PRE: 119 %
ERV-PRE: 0.51 L
ERV-PRED: 0.43 L
EXPTIME-PRE: 7.32 SEC
FEF2575-%PRED-POST: 94 %
FEF2575-%PRED-PRE: 110 %
FEF2575-POST: 2.53 L/SEC
FEF2575-PRE: 2.95 L/SEC
FEF2575-PRED: 2.67 L/SEC
FEFMAX-%PRED-PRE: 66 %
FEFMAX-PRE: 4.28 L/SEC
FEFMAX-PRED: 6.46 L/SEC
FEV1-%PRED-PRE: 108 %
FEV1-PRE: 2.67 L
FEV1FEV6-PRE: 83 %
FEV1FEV6-PRED: 83 %
FEV1FVC-PRE: 83 %
FEV1FVC-PRED: 83 %
FEV1SVC-PRE: 82 %
FEV1SVC-PRED: 76 %
FIFMAX-PRE: 2.61 L/SEC
FRCPLETH-%PRED-PRE: 78 %
FRCPLETH-PRE: 1.98 L
FRCPLETH-PRED: 2.52 L
FVC-%PRED-PRE: 108 %
FVC-PRE: 3.22 L
FVC-PRED: 2.98 L
IC-%PRED-PRE: 91 %
IC-PRE: 2.58 L
IC-PRED: 2.81 L
RVPLETH-%PRED-PRE: 85 %
RVPLETH-PRE: 1.3 L
RVPLETH-PRED: 1.51 L
TLCPLETH-%PRED-PRE: 102 %
TLCPLETH-PRE: 4.56 L
TLCPLETH-PRED: 4.45 L
VA-%PRED-PRE: 99 %
VA-PRE: 4.33 L
VC-%PRED-PRE: 100 %
VC-PRE: 3.26 L
VC-PRED: 3.24 L

## 2023-01-30 ENCOUNTER — HOSPITAL ENCOUNTER (OUTPATIENT)
Dept: CT IMAGING | Facility: CLINIC | Age: 43
Discharge: HOME | End: 2023-01-30
Attending: NURSE PRACTITIONER | Admitting: NURSE PRACTITIONER
Payer: COMMERCIAL

## 2023-01-30 ENCOUNTER — OFFICE VISIT (OUTPATIENT)
Dept: ALLERGY | Facility: CLINIC | Age: 43
End: 2023-01-30
Payer: COMMERCIAL

## 2023-01-30 VITALS
BODY MASS INDEX: 39.46 KG/M2 | RESPIRATION RATE: 16 BRPM | HEIGHT: 61 IN | HEART RATE: 82 BPM | WEIGHT: 209 LBS | OXYGEN SATURATION: 98 %

## 2023-01-30 DIAGNOSIS — J30.1 SEASONAL ALLERGIC RHINITIS DUE TO POLLEN: ICD-10-CM

## 2023-01-30 DIAGNOSIS — J45.41 MODERATE PERSISTENT ASTHMA WITH ACUTE EXACERBATION: ICD-10-CM

## 2023-01-30 DIAGNOSIS — J30.89 SEASONAL ALLERGIC RHINITIS DUE TO OTHER ALLERGIC TRIGGER: ICD-10-CM

## 2023-01-30 DIAGNOSIS — R06.09 OTHER FORM OF DYSPNEA: Primary | ICD-10-CM

## 2023-01-30 PROCEDURE — 71250 CT THORAX DX C-: CPT

## 2023-01-30 PROCEDURE — 95004 PERQ TESTS W/ALRGNC XTRCS: CPT | Performed by: ALLERGY & IMMUNOLOGY

## 2023-01-30 PROCEDURE — 99203 OFFICE O/P NEW LOW 30 MIN: CPT | Mod: 25 | Performed by: ALLERGY & IMMUNOLOGY

## 2023-01-30 NOTE — PROGRESS NOTES
Ragini Varma is a 42 year old, presenting for the following health issues:  Allergy Consult and Asthma Consult (Pulmonology referred, never had allergies or asthma now has both)      HPI     Chief complaint:  Allergies    History of present illness: This is a pleasant 42-year-old woman I was asked to see for evaluation by Thais Horton here today for evaluation of allergies.  Patient states as an adult, she has developed allergies and asthma.  She states she never had them growing up.  She states during the spring she will have sneezing nasal congestion especially when they are trying to open her.  She takes claritin as needed but she is not sure if this helps if she states she does not like to take medication regularly.  No nasal sprays or rinses.  Recently was suggested to her to try montelukast but she has not yet picked this up from the pharmacy.  She has been diagnosed with asthma.  She states this occurred as an adult as well.  She is currently under the evaluation of pulmonary for this.  Of note, she was in the emergency room in November for an asthma exacerbation.  This was thought to be triggered by an upper respiratory tract infection.  She is currently using Advair 250/50 1 puff twice daily, however, she is going to transition to Symbicort.  She has an albuterol inhaler reports this does not seem to help.  She states she notes breathlessness frequently and wheezing with coming from her throat rather than her chest.  She states when her breathing is at its worst it can wake her up from sleep, however, this is rare.  She states that even walking from the parking lot on a cold day like this will trigger her symptoms and she will have some difficulty recovering.  She states she cannot walk up stairs due to the breathlessness and wheezing.  She is not sure if controller medications are helping.  Recent pulmonary function test demonstrated normal lung function and flattening of the inspiratory  "limb.  She has a referral to ENT.    Past medical history: Diabetes mellitus.  History of COVID in the spring 2021    Social history: She works as a  and an HR, non-smoker, lives in a home that is built in the 1960s, no mold damage, no pets, central air, basement    Family history: Daughters with asthma and allergies and mom has been        Review of Systems   Constitutional, HEENT, cardiovascular, pulmonary, gi and gu systems are negative, except as otherwise noted.      Objective    Pulse 82   Resp 16   Ht 1.552 m (5' 1.1\")   Wt 94.8 kg (209 lb)   SpO2 98%   BMI 39.36 kg/m    Body mass index is 39.36 kg/m .  Physical Exam   Gen: Pleasant female not in acute distress  HEENT: Eyes no erythema of the bulbar or palpebral conjunctiva, no edema. Ears: No deformities or lesions nose: No congestion, mucosa normal. Mouth: Throat clear, no lip or tongue edema.   Cardiac: Regular rate and rhythm, no murmurs, rubs or gallops  Respiratory: Clear to auscultation bilaterally, no adventitious breath sounds  Lymph: No visible supraclavicular or cervical lymphadenopathy  Neck: No masses lesions or swelling  Skin: No rashes or lesions  Psych: Alert and oriented times 3      At today s visit the patient/parent and I engaged in an informed consent discussion about allergy testing.  We discussed skin testing, blood testing,  and the alternative of not undergoing any testing. The patient has a preference for skin testing. We then discussed the risks and benefits of skin testing.  The patient understands skin testing risks can include, but are not limited to, urticaria, angioedema, shortness of breath, and severe anaphylaxis.  The benefits include, but are not limited, to evaluation for allergens causing symptoms.  After answering the patients/parents questions they have agreed to proceed with skin testing.    30 percutaneous test were undertaken today environmental skin test panel.  Positive histamine control with a " positive test to tree pollen and small to pigweed pollen.  Please see scanned photograph.    Impression report and plan:    1.  Allergic rhinitis to tree pollen  2.  Shortness of breath/wheezing    Allergy testing was positive only for tree pollen.  For this reason, I do not think she is to start montelukast at this time.  Recommended cetirizine 10 mg during the spring.  She will continue to follow with pulmonary physicians.  She has more upper airway symptoms I suspect either an upper airway lesion versus vocal cord dysfunction is causing the majority of her breathing symptoms. She will follow-up with ENT. Follow as needed.

## 2023-01-30 NOTE — LETTER
1/30/2023         RE: Anuradha Brown  [address] [id number]        Dear Colleague,    Thank you for referring your patient, Anuradha Brown, to the [facility]. Please see a copy of my visit note below.          Ragini Varma is a 42 year old, presenting for the following health issues:  Allergy Consult and Asthma Consult (Pulmonology referred, never had allergies or asthma now has both)      HPI     Chief complaint:  Allergies    History of present illness: This is a pleasant 42-year-old woman I was asked to see for evaluation by Thais Horton here today for evaluation of allergies.  Patient states as an adult, she has developed allergies and asthma.  She states she never had them growing up.  She states during the spring she will have sneezing nasal congestion especially when they are trying to open her.  She takes claritin as needed but she is not sure if this helps if she states she does not like to take medication regularly.  No nasal sprays or rinses.  Recently was suggested to her to try montelukast but she has not yet picked this up from the pharmacy.  She has been diagnosed with asthma.  She states this occurred as an adult as well.  She is currently under the evaluation of pulmonary for this.  Of note, she was in the emergency room in November for an asthma exacerbation.  This was thought to be triggered by an upper respiratory tract infection.  She is currently using Advair 250/50 1 puff twice daily, however, she is going to transition to Symbicort.  She has an albuterol inhaler reports this does not seem to help.  She states she notes breathlessness frequently and wheezing with coming from her throat rather than her chest.  She states when her breathing is at its worst it can wake her up from sleep, however, this is rare.  She states that even walking from the parking lot on a cold day like this will trigger her symptoms and she will have  "some difficulty recovering.  She states she cannot walk up stairs due to the breathlessness and wheezing.  She is not sure if controller medications are helping.  Recent pulmonary function test demonstrated normal lung function and flattening of the inspiratory limb.  She has a referral to ENT.    Past medical history: Diabetes mellitus.  History of COVID in the spring 2021    Social history: She works as a  and an HR, non-smoker, lives in a home that is built in the 1960s, no mold damage, no pets, central air, basement    Family history: Daughters with asthma and allergies and mom has been        Review of Systems   Constitutional, HEENT, cardiovascular, pulmonary, gi and gu systems are negative, except as otherwise noted.     Objective    Pulse 82   Resp 16   Ht 1.552 m (5' 1.1\")   Wt 94.8 kg (209 lb)   SpO2 98%   BMI 39.36 kg/m    Body mass index is 39.36 kg/m .  Physical Exam   Gen: Pleasant female not in acute distress  HEENT: Eyes no erythema of the bulbar or palpebral conjunctiva, no edema. Ears: No deformities or lesions nose: No congestion, mucosa normal. Mouth: Throat clear, no lip or tongue edema.   Cardiac: Regular rate and rhythm, no murmurs, rubs or gallops  Respiratory: Clear to auscultation bilaterally, no adventitious breath sounds  Lymph: No visible supraclavicular or cervical lymphadenopathy  Neck: No masses lesions or swelling  Skin: No rashes or lesions  Psych: Alert and oriented times 3      At today s visit the patient/parent and I engaged in an informed consent discussion about allergy testing.  We discussed skin testing, blood testing,  and the alternative of not undergoing any testing. The patient has a preference for skin testing. We then discussed the risks and benefits of skin testing.  The patient understands skin testing risks can include, but are not limited to, urticaria, angioedema, shortness of breath, and severe anaphylaxis.  The benefits include, but are not limited, " to evaluation for allergens causing symptoms.  After answering the patients/parents questions they have agreed to proceed with skin testing.    30 percutaneous test were undertaken today environmental skin test panel.  Positive histamine control with a positive test to tree pollen and small to pigweed pollen.  Please see scanned photograph.    Impression report and plan:   1.  Allergic rhinitis to tree pollen  2.  Shortness of breath/wheezing    Allergy testing was positive only for tree pollen.  For this reason, I do not think she is to start montelukast at this time.  Recommended cetirizine 10 mg during the spring.  She will continue to follow with pulmonary physicians.  She has more upper airway symptoms I suspect either an upper airway lesion versus vocal cord dysfunction is causing the majority of her breathing symptoms. She will follow-up with ENT. Follow as needed.            Again, thank you for allowing me to participate in the care of your patient.        Sincerely,        Cherelle DAVID MD

## 2023-01-31 NOTE — TELEPHONE ENCOUNTER
FUTURE VISIT INFORMATION      FUTURE VISIT INFORMATION:    Date: 3/24/2023    Time: 9:30 AM    Location: [facility]  REFERRAL INFORMATION:    Referring provider: Dr. Mack    Referring providers clinic:  [facility]    Reason for visit/diagnosis: Seasonal Allergic Rhinitis hoffman to allergic trigger    RECORDS REQUESTED FROM:       Clinic name Comments Records Status Imaging Status   [facility] 1/27/23 - PULM OV with Dr. Mack [facility] 1/30/23 - ALLERGY OV with Dr. Tuttle [facility] 1/10/23 - PCC OV with Dr. Sifuentes [facility]    [facility] - Procedure 1/27/23 - PFT [facility] - Imaging 1/30/23 - CT Chest [facility] PACs   [facility] 11/9/22 - ED OV with Dr. Stauffer [facility] 7/2/22 -  [facility]

## 2023-03-14 NOTE — TELEPHONE ENCOUNTER
Called and verified with patient she would like to continue with the Ozempic 1 mg dose.  The refill request is for 3 mL which is correct Pen is 4 mg/ 3 mL (4 doses per pen) 4 (moderate pain)

## 2023-03-22 ENCOUNTER — OFFICE VISIT (OUTPATIENT)
Dept: PULMONOLOGY | Facility: CLINIC | Age: 43
End: 2023-03-22
Payer: COMMERCIAL

## 2023-03-22 VITALS
DIASTOLIC BLOOD PRESSURE: 76 MMHG | BODY MASS INDEX: 40.68 KG/M2 | SYSTOLIC BLOOD PRESSURE: 116 MMHG | WEIGHT: 216 LBS | OXYGEN SATURATION: 98 % | HEART RATE: 84 BPM

## 2023-03-22 DIAGNOSIS — J45.20 MILD INTERMITTENT ASTHMA WITHOUT COMPLICATION: ICD-10-CM

## 2023-03-22 DIAGNOSIS — J38.3 VOCAL CORD DISEASE: ICD-10-CM

## 2023-03-22 PROCEDURE — 99213 OFFICE O/P EST LOW 20 MIN: CPT | Performed by: INTERNAL MEDICINE

## 2023-03-22 ASSESSMENT — ASTHMA QUESTIONNAIRES
QUESTION_4 LAST FOUR WEEKS HOW OFTEN HAVE YOU USED YOUR RESCUE INHALER OR NEBULIZER MEDICATION (SUCH AS ALBUTEROL): NOT AT ALL
ACT_TOTALSCORE: 20
QUESTION_1 LAST FOUR WEEKS HOW MUCH OF THE TIME DID YOUR ASTHMA KEEP YOU FROM GETTING AS MUCH DONE AT WORK, SCHOOL OR AT HOME: NONE OF THE TIME
ACT_TOTALSCORE: 20
QUESTION_2 LAST FOUR WEEKS HOW OFTEN HAVE YOU HAD SHORTNESS OF BREATH: ONCE A DAY
EMERGENCY_ROOM_LAST_YEAR_TOTAL: ONE
QUESTION_3 LAST FOUR WEEKS HOW OFTEN DID YOUR ASTHMA SYMPTOMS (WHEEZING, COUGHING, SHORTNESS OF BREATH, CHEST TIGHTNESS OR PAIN) WAKE YOU UP AT NIGHT OR EARLIER THAN USUAL IN THE MORNING: NOT AT ALL
QUESTION_5 LAST FOUR WEEKS HOW WOULD YOU RATE YOUR ASTHMA CONTROL: SOMEWHAT CONTROLLED

## 2023-03-22 NOTE — PROGRESS NOTES
Pulmonary Clinic Follow-up Visit    Assessment:  42-year-old female with mild allergies and asthma with flow-volume loop concerning for vocal cord dysfunction or anomaly given significantly flattened inspiratory limb.         Plan:  Continue Symbicort with spacer  ENT appointment pending, hoping this will be fruitful in elucidating the cause of her symptoms  Consider methacholine challenge after ENT evaluation if symptoms persist.      Margarita Mack MD  Pulmonary/Critical Care    ----------------------------    CCx: Asthma, stridor    HPI: 42yoF with adult onset allergies and asthma. She has previously been seen in January 2023. . Last isit with Dr. Tuttle, 1/30/23 found positive allergy results to tree pollen and pigweed pollen. Recommended not using Singulair given relatively few allergens. Recommended follow up with ENT out of concern for vocal cord issues as  of her symptoms given her very abnormal flow volume loop.    Unfortunatelhy this appointment has not occurred as it was cancelled and rescheduled. Patient understandably frustrated.     Wheeze, cough, and SOB with any activity, talking for long periods of time will trigger symptoms as well.   Symbicort seems to be less irritating to her throat than the advair. There is less discomfort and difficulty swallowing.  Lots of anxiety around using the peak flow meter, frustrated her numbers are not good.     Rhinorrhea but does not seem to cause PND     Has not been needing albuterol.     Current Regimen: Symbicort, singulair  ACT: previously 19: 20    ROS:  12-point review performed and notable for  Wheezing, shortness of breath, fractured knee cap after fall.  The remainder reviewed and negative.       Current Meds:  Current Outpatient Medications   Medication Sig Dispense Refill     albuterol (PROAIR HFA/PROVENTIL HFA/VENTOLIN HFA) 108 (90 Base) MCG/ACT inhaler Inhale 2 puffs into the lungs every 4 hours as needed for shortness of breath or wheezing 18 g  11     atorvastatin (LIPITOR) 20 MG tablet Take 1 tablet (20 mg) by mouth daily 90 tablet 3     budesonide-formoterol (SYMBICORT) 160-4.5 MCG/ACT Inhaler Inhale 2 puffs into the lungs 2 times daily 6 g 11     fluticasone-salmeterol (ADVAIR) 250-50 MCG/ACT inhaler Inhale 1 puff into the lungs every 12 hours 3 each 3     montelukast (SINGULAIR) 10 MG tablet Take 1 tablet (10 mg) by mouth At Bedtime 30 tablet 11     OZEMPIC, 1 MG/DOSE, 4 MG/3ML SOPN INJECT 1MG SUBCUTANEOUSLY EVERY WEEK *rotate injection sites* 3 mL 6     Spacer/Aero-Holding Chambers (AEROCHAMBER WITH MOUTHPIECE) MISC USE AS DIRECTED WITH VENTOLIN INHALER         Physical Exam:  /76 (BP Location: Right arm, Patient Position: Chair, Cuff Size: Adult Large)   Pulse 84   Wt 98 kg (216 lb)   SpO2 98%   BMI 40.68 kg/m    Gen: alert, oriented, no distress  HEENT: no lymphadenopathy  Neck: Trachea midline, No Accessory muscle use  CV: RRR, no M/G/R  Resp: expiratory stridor appreciated at the level of the vocal cords transmitting to lung space.   Abd: soft, nontender, no palpable organomegaly  Skin: no apparent rashes  Ext: no cyanosis, clubbing or edema  Neuro: alert, nonfocal    Labs:  CBC RESULTS:   Recent Labs   Lab Test 01/27/23  0842 04/29/22  1043   WBC  --  5.5   RBC  --  4.83   HGB 14.5 14.4   HCT  --  43.6   MCV  --  90   MCH  --  29.8   MCHC  --  33.0   RDW  --  12.9   PLT  --  349     Sodium   Date Value Ref Range Status   04/29/2022 141 133 - 144 mmol/L Final     Potassium   Date Value Ref Range Status   04/29/2022 4.1 3.4 - 5.3 mmol/L Final     Chloride   Date Value Ref Range Status   04/29/2022 110 (H) 94 - 109 mmol/L Final     Carbon Dioxide (CO2)   Date Value Ref Range Status   04/29/2022 26 20 - 32 mmol/L Final     Anion Gap   Date Value Ref Range Status   04/29/2022 5 3 - 14 mmol/L Final     Glucose   Date Value Ref Range Status   04/29/2022 115 (H) 70 - 99 mg/dL Final     Urea Nitrogen   Date Value Ref Range Status   04/29/2022 12  7 - 30 mg/dL Final     Creatinine   Date Value Ref Range Status   04/29/2022 0.76 0.52 - 1.04 mg/dL Final     GFR Estimate   Date Value Ref Range Status   04/29/2022 >90 >60 mL/min/1.73m2 Final     Comment:     Effective December 21, 2021 eGFRcr in adults is calculated using the 2021 CKD-EPI creatinine equation which includes age and gender (Jesse et al., NEJ, DOI: 10.1056/PJUKyd1877387)     Calcium   Date Value Ref Range Status   04/29/2022 9.1 8.5 - 10.1 mg/dL Final         Imaging studies:  Personally reviewed image/s and Personally reviewed impression/s  EXAM: CT CHEST HI-RESOLUTION WO CONTRAST  LOCATION: [facility]  DATE/TIME: 1/30/2023 6:28 AM     INDICATION: Shortness of breath and wheezing.  COMPARISON: None.  TECHNIQUE: High resolution images were obtained through the chest during inspiration with select expiratory views. Prone imaging was performed. Multiplanar reformats were obtained. Dose reduction techniques were used.  CONTRAST: None.     FINDINGS:   LUNGS AND PLEURA: Lungs are clear. No groundglass, reticulation or honeycombing. No airway thickening or bronchiectasis. Expiration images demonstrate no significant gas trapping or central airway collapse. No pleural effusion or pneumothorax.     MEDIASTINUM/AXILLAE: No adenopathy. Normal heart size. No pericardial effusion. Normal caliber aorta and pulmonary arteries.     CORONARY ARTERY CALCIFICATION: None.     UPPER ABDOMEN: Nothing acute.     MUSCULOSKELETAL: Nothing acute.                                                                      IMPRESSION:   1.  No findings to explain symptoms.  2.  No interstitial lung disease.  3.  No significant airway findings by CT.      PFT's  FEV1/FVC is 83 and is normal.  FEV1 is 2.67 L (108% predicted) and is normal.  FVC is 3.22 L (108% predicted) and normal.  There was no improvement in spirometry after a single inhaled dose of bronchodilator.  TLC is 4.56 L (102% predicted) and is  normal.  RV is 1.3 L (85% predicted) and is normal.  DLCO is 124 % predicted and is normal when it was corrected for hemoglobin.  Impression: normal PFTs.

## 2023-03-24 ENCOUNTER — OFFICE VISIT (OUTPATIENT)
Dept: OTOLARYNGOLOGY | Facility: CLINIC | Age: 43
End: 2023-03-24
Payer: COMMERCIAL

## 2023-03-24 ENCOUNTER — PRE VISIT (OUTPATIENT)
Dept: OTOLARYNGOLOGY | Facility: CLINIC | Age: 43
End: 2023-03-24

## 2023-03-24 DIAGNOSIS — J30.89 SEASONAL ALLERGIC RHINITIS DUE TO OTHER ALLERGIC TRIGGER: ICD-10-CM

## 2023-03-24 DIAGNOSIS — J38.6 SUBGLOTTIC STENOSIS: ICD-10-CM

## 2023-03-24 DIAGNOSIS — R06.02 SHORTNESS OF BREATH: Primary | ICD-10-CM

## 2023-03-24 PROCEDURE — 31579 LARYNGOSCOPY TELESCOPIC: CPT | Mod: 52 | Performed by: SPEECH-LANGUAGE PATHOLOGIST

## 2023-03-24 PROCEDURE — 92524 BEHAVRAL QUALIT ANALYS VOICE: CPT | Mod: GN | Performed by: SPEECH-LANGUAGE PATHOLOGIST

## 2023-03-24 NOTE — PROGRESS NOTES
[facility] VOICE [facility]  Lucas Duncan Jr., M.D., F.A.C.S.  Cherelle Rahman M.D., M.P.H.  Haylie Polo M.D.  Flor Staples, Ph.D., CCC-SLP  Ayo Barber, Ph.D., [facility]-SLP  Hannah Kulkarni M.M. (voice), M.A., CCC-SLP  Jeana Smith M.S., CCC-SLP  Lizzie Huffman M.S., CCC-SLP  CADY Bustos (voice), M.S., CCC-SLP    [facility]  VOICE/SPEECH/BREATHING EVALUATION AND LARYNGEAL EXAMINATION REPORT    Patient: Anuradha Brown  Date of Visit: 3/24/2023    Clinician: Flor Staples, Ph.D., CCC/SLP  Referring Physician: Dr. Byrne, pulmonologist    CHIEF COMPLAINT:  dyspnea    HISTORY  Anuradha Brown is a 42 year old presenting today for evaluation of dyspnea.    Salient details of her history are as follows:    Several year history of shortness of breath, possibly worsening        BREATHING    Onset/ inciting factors: gradual onset of breathing problems several years ago, probably around COVID time, although she knows she didn't have COIVD    Felt her breathing was noisy, which she thought was in her throat    Mentioned it at her yearly physical (2021); started being treated for asthma    Continues to assert that the difficulty breathing felt like it was in her throat, and is not in her lungs    November 2022 a nocturnal episode of probable laryngospasm; also couldn't talk    Didn't use inhaler because she was sure it wouldn't help    Went to ER; two nebs, gave prednisone; sent away    No further nocturnal episodes since then    Continued to assert that it's not getting better    Progression: unclear; still using a steroid inhaler    History of intervention:    Treated for asthma for past two years with no benefit    Treated with prednisone and then inhalers    No previous Hx asthma    Had been using expiratory muscle strength , but couldn't get good enough    Follow-up with pulmonologists; now flow volume loops show flattened inspiratory loops    told to discontinue EMST    Referred to  "allergy and ENT    Allergy workup with Dr. Cherelle Tuttle showed seasonal spring allergies; one MD had Rx'd Singulair, Dr. Tuttle said don't take it    No ENT workup yet    Current Symptoms:     Never chest pain; never feels anything in her lungs    Breathing is noisy even with light exertion like walking up steps; can't go to gym    Endorses and demonstrates stridor    Denies wheezing    There's a whistling sounds when she gets dressed    For a while, couldn't do a full yawn; she would gag; now she can yawn since being on steroid inhalers    Both breathing in and out are hard    Improves with: resting    Worsens with: talking, moving, sometimes eating    Has learned to live with a lot of these symptoms    COUGH/THROAT CLEARING    Onset/ inciting factors: Long-term cough; \"maybe always had a chronic cough;\"     Progression: stable    History of intervention:    Coughed a lot more when she was on the prednisone; this resolved    Current Symptoms:    Daily cough and throat clear    Dry; feels like upper airway, not lungs    Improves with: nothing; doesn't take anything for it     Maybe gum or salivary stimulation    Worsens with: unknown    VOICE    Sometimes gets hoarse for some unknown reason, probably associated with talking a lot    Generally speaking voice is OK    SWALLOWING    No complaints    OTHER PERTINENT HISTORY    Unknown; Please refer to chart for additional history    OBJECTIVE FINDINGS  PERCEPTUAL EVALUATION (14458)  VOICE/ SPEECH/ NON-COMMUNICATIVE LARYNGEAL BEHAVIORS EVALUATION  An evaluation of the voice and breathing was accomplished today; salient features are as follows:      Breathing pattern:     Chest elevation for a deep inhalation    There is almost a whistling sound on deep inhalation; not consistent with stridor    Cough/ Throat clear:    Not observed today    No overt tension is evident.    Voice quality is characterized by:    Clear, robust quality    Noisy inhalations during all of " conversational speech    No roughness, breathiness, strain, or transient vocal disturbances    Habitual pitch was not formally tested, but is judged to be WNL and appropriate    Loudness is WNL and appropriate for the setting    LARYNGEAL EXAMINATION (72240)  Endoscopic laryngeal examination was performed by:  Flor Staples, Ph.D., CCC-SLP  Type of exam:   Flexible endoscopy with chip-tip technology  Topical anesthetic was applied  The endoscope was passed through the left nare    This exam shows:  Laryngeal and Vocal Fold Mucosa    Essentially healthy laryngeal mucosa    No remarkable signs of reflux    Presence of secretions is unremarkable.    There is an obvious subglottic stenosis  o Apparent scar tissue in the center of the immediate subglottis, leading what appears to be a small slit for an airway  o There is also a circular area of scar tissue more posteriorly  o There may be an area of airway posterior and inferior on the left, that is obscured from view by the left arytenoid  - I did not advance the scope past the vocal folds    Status of vocal fold mucosa:   o Within normal limits, with no lesions and straight vibratory margins    Neurological and Functional Integrity of the Larynx    Vocal folds appear mobile but assessment of neurologic function was not done in today's exam    Vocal fold abduction is complete, the airway at the level of the glottis is normal and patent    No assessment of glottic closure was done in today's exam     Supraglottic Function and Therapy Probes    No assessment of supraglottic function was done in today's exam    Stroboscopy was not warranted.          I reviewed this laryngeal exam with Ms. Brown today, and provided pertinent explanations:    Endoscopic findings are consistent with audio-perceptual assessment and patient Hx/complaints.    her symptoms are accounted for by the obvious subglottic stenosis    I will refer Ms. Brown emergently to Dr. Polo for treatment  of the stenosis    Other aspects of the laryngeal exam are assumed to be normal, given the completely normal voice quality    No speech therapy is warranted at this time; she is referred for medical treatment    ASSESSMENT / PLAN  IMPRESSIONS:  This evaluation has resulted in the following diagnosis/diagnoses for Ms. Brown  Shortness Of Breath (R06.02)  Subglottic Stenosis (J38.6)       Laryngeal evaluation demonstrated obvious subglottic stenosis    Perceptual evaluation demonstrated normal voice quality, but with noisy inhalation, as well as a whistling sound on deep inhalation that is not consistent with stridor  RECOMMENDATIONS:     Immediate medical treatment is recommended, and I have facilitated a visit with Dr. Polo.     No speech therapy is warranted at this time.  Therefore there are no goals, and Ms. Brown is discharged from this servi    TOTAL SERVICE TIME: 70 minutes  EVALUATION OF VOICE AND RESONANCE (41242)  ENDOSCOPIC LARYNGEAL EXAMINATION WITHOUT STROBOSCOPY (38472)      Flor Staples, Ph.D., [facility]-SLP  Speech-Language Pathologist  Director, [facility]  She/her/hers  [phone]

## 2023-03-24 NOTE — LETTER
3/24/2023       RE: Anuradha Brown  [address] [address]     Dear Colleague,    Thank you for referring your patient, Anuradha Brown, to the [facility] VOICE CLINIC [place] [facility]. Please see a copy of my visit note below.    [facility]  Lucas Duncan Jr., M.D., F.A.C.S.  Cherelle Rahman M.D., M.P.H.  Haylie Polo M.D.  Flor Staples, Ph.D., CCC-SLP  Ayo Barber, Ph.D., [facility]-SLP  Hannah Kulkarni M.M. (voice), M.A., CCC-SLP  Jeana Smith M.S., CCC-SLP  Lizzie Huffman M.S., CCC-SLP  CADY Bustos (voice), M.S., CCC-SLP    [facility]  VOICE/SPEECH/BREATHING EVALUATION AND LARYNGEAL EXAMINATION REPORT    Patient: Anuradha Brown  Date of Visit: 3/24/2023    Clinician: Flor Staples, Ph.D., CCC/SLP  Referring Physician: Dr. Byrne, pulmonologist    CHIEF COMPLAINT:  dyspnea    HISTORY  Anuradha Brown is a 42 year old presenting today for evaluation of dyspnea.    Salient details of her history are as follows:    Several year history of shortness of breath, possibly worsening        BREATHING    Onset/ inciting factors: gradual onset of breathing problems several years ago, probably around COVID time, although she knows she didn't have COIVD    Felt her breathing was noisy, which she thought was in her throat    Mentioned it at her yearly physical (2021); started being treated for asthma    Continues to assert that the difficulty breathing felt like it was in her throat, and is not in her lungs    November 2022 a nocturnal episode of probable laryngospasm; also couldn't talk    Didn't use inhaler because she was sure it wouldn't help    Went to ER; two nebs, gave prednisone; sent away    No further nocturnal episodes since then    Continued to assert that it's not getting better    Progression: unclear; still using a steroid inhaler    History of intervention:    Treated for asthma for past  "two years with no benefit    Treated with prednisone and then inhalers    No previous Hx asthma    Had been using expiratory muscle strength , but couldn't get good enough    Follow-up with pulmonologists; now flow volume loops show flattened inspiratory loops    told to discontinue EMST    Referred to allergy and ENT    Allergy workup with Dr. Cherelle Tuttle showed seasonal spring allergies; one MD had Rx'd Singulair, Dr. Tuttle said don't take it    No ENT workup yet    Current Symptoms:     Never chest pain; never feels anything in her lungs    Breathing is noisy even with light exertion like walking up steps; can't go to gym    Endorses and demonstrates stridor    Denies wheezing    There's a whistling sounds when she gets dressed    For a while, couldn't do a full yawn; she would gag; now she can yawn since being on steroid inhalers    Both breathing in and out are hard    Improves with: resting    Worsens with: talking, moving, sometimes eating    Has learned to live with a lot of these symptoms    COUGH/THROAT CLEARING    Onset/ inciting factors: Long-term cough; \"maybe always had a chronic cough;\"     Progression: stable    History of intervention:    Coughed a lot more when she was on the prednisone; this resolved    Current Symptoms:    Daily cough and throat clear    Dry; feels like upper airway, not lungs    Improves with: nothing; doesn't take anything for it     Maybe gum or salivary stimulation    Worsens with: unknown    VOICE    Sometimes gets hoarse for some unknown reason, probably associated with talking a lot    Generally speaking voice is OK    SWALLOWING    No complaints    OTHER PERTINENT HISTORY    Unknown; Please refer to chart for additional history    OBJECTIVE FINDINGS  PERCEPTUAL EVALUATION (98656)  VOICE/ SPEECH/ NON-COMMUNICATIVE LARYNGEAL BEHAVIORS EVALUATION  An evaluation of the voice and breathing was accomplished today; salient features are as follows:      Breathing pattern: "     Chest elevation for a deep inhalation    There is almost a whistling sound on deep inhalation; not consistent with stridor    Cough/ Throat clear:    Not observed today    No overt tension is evident.    Voice quality is characterized by:    Clear, robust quality    Noisy inhalations during all of conversational speech    No roughness, breathiness, strain, or transient vocal disturbances    Habitual pitch was not formally tested, but is judged to be WNL and appropriate    Loudness is WNL and appropriate for the setting    LARYNGEAL EXAMINATION (59465)  Endoscopic laryngeal examination was performed by:  Flor Staples, Ph.D., CCC-SLP  Type of exam:   Flexible endoscopy with chip-tip technology  Topical anesthetic was applied  The endoscope was passed through the left nare    This exam shows:  Laryngeal and Vocal Fold Mucosa    Essentially healthy laryngeal mucosa    No remarkable signs of reflux    Presence of secretions is unremarkable.    There is an obvious subglottic stenosis  o Apparent scar tissue in the center of the immediate subglottis, leading what appears to be a small slit for an airway  o There is also a circular area of scar tissue more posteriorly  o There may be an area of airway posterior and inferior on the left, that is obscured from view by the left arytenoid  - I did not advance the scope past the vocal folds    Status of vocal fold mucosa:   o Within normal limits, with no lesions and straight vibratory margins    Neurological and Functional Integrity of the Larynx    Vocal folds appear mobile but assessment of neurologic function was not done in today's exam    Vocal fold abduction is complete, the airway at the level of the glottis is normal and patent    No assessment of glottic closure was done in today's exam     Supraglottic Function and Therapy Probes    No assessment of supraglottic function was done in today's exam    Stroboscopy was not warranted.          I reviewed this  laryngeal exam with Ms. Brown today, and provided pertinent explanations:    Endoscopic findings are consistent with audio-perceptual assessment and patient Hx/complaints.    her symptoms are accounted for by the obvious subglottic stenosis    I will refer Ms. Brown emergently to Dr. Polo for treatment of the stenosis    Other aspects of the laryngeal exam are assumed to be normal, given the completely normal voice quality    No speech therapy is warranted at this time; she is referred for medical treatment    ASSESSMENT / PLAN  IMPRESSIONS:  This evaluation has resulted in the following diagnosis/diagnoses for Ms. Brown  Shortness Of Breath (R06.02)  Subglottic Stenosis (J38.6)       Laryngeal evaluation demonstrated obvious subglottic stenosis    Perceptual evaluation demonstrated normal voice quality, but with noisy inhalation, as well as a whistling sound on deep inhalation that is not consistent with stridor  RECOMMENDATIONS:     Immediate medical treatment is recommended, and I have facilitated a visit with Dr. Polo.     No speech therapy is warranted at this time.  Therefore there are no goals, and Ms. Brown is discharged from this servi    TOTAL SERVICE TIME: 70 minutes  EVALUATION OF VOICE AND RESONANCE (74280)  ENDOSCOPIC LARYNGEAL EXAMINATION WITHOUT STROBOSCOPY (10465)      Flor Staples, Ph.D., [facility]-SLP  Speech-Language Pathologist  Director, [facility]  She/her/hers  [phone]

## 2023-03-27 ENCOUNTER — TELEPHONE (OUTPATIENT)
Dept: OTOLARYNGOLOGY | Facility: CLINIC | Age: 43
End: 2023-03-27
Payer: COMMERCIAL

## 2023-03-27 NOTE — TELEPHONE ENCOUNTER
FUTURE VISIT INFORMATION:      FUTURE VISIT INFORMATION:    Date: 3/28/23    Time: 2 PM    Location: [facility]  REFERRAL INFORMATION:    Referring provider:     Referring providers clinic:     Reason for visit/diagnosis:  subglottic stenosis.ref:corrina staples    RECORDS REQUESTED FROM:       Clinic name Comments Records Status Imaging Status   [facility] 3/24/23 - note Flor Staples, SLP [facility] [place] 1/27/23 - [facility] - ALLERGY OV with Dr. Tuttle [facility] [place] 1/10/23 - PCC OV with Dr. Sifuentes [facility]     [facility] - Procedure 1/27/23 - PFT [facility]     [facility] - Imaging 1/30/23 - CT Chest [facility] PACS   [facility] 11/9/22 - ED OV with Dr. Stauffer  9/7/21 - CT chest  [facility] PACS   [facility] 7/2/22 - UC OV with MUKESH Arguelles [facility]

## 2023-03-27 NOTE — TELEPHONE ENCOUNTER
[facility] Call Center    Phone Message    May a detailed message be left on voicemail: yes     Reason for Call: Other: Per pt she says she can't see her test results and she wants to know how soon she can schedule her sx. Please call to discuss. Thank you    Action Taken: Message routed to:  [facility] ([facility] ENT    Travel Screening: Not Applicable

## 2023-03-28 ENCOUNTER — LAB (OUTPATIENT)
Dept: LAB | Facility: CLINIC | Age: 43
End: 2023-03-28
Payer: COMMERCIAL

## 2023-03-28 ENCOUNTER — TRANSFERRED RECORDS (OUTPATIENT)
Dept: HEALTH INFORMATION MANAGEMENT | Facility: CLINIC | Age: 43
End: 2023-03-28

## 2023-03-28 ENCOUNTER — PREP FOR PROCEDURE (OUTPATIENT)
Dept: OTOLARYNGOLOGY | Facility: CLINIC | Age: 43
End: 2023-03-28

## 2023-03-28 ENCOUNTER — OFFICE VISIT (OUTPATIENT)
Dept: OTOLARYNGOLOGY | Facility: CLINIC | Age: 43
End: 2023-03-28
Payer: COMMERCIAL

## 2023-03-28 ENCOUNTER — PRE VISIT (OUTPATIENT)
Dept: OTOLARYNGOLOGY | Facility: CLINIC | Age: 43
End: 2023-03-28

## 2023-03-28 VITALS
WEIGHT: 214.8 LBS | SYSTOLIC BLOOD PRESSURE: 121 MMHG | DIASTOLIC BLOOD PRESSURE: 90 MMHG | HEART RATE: 81 BPM | HEIGHT: 61 IN | BODY MASS INDEX: 40.55 KG/M2 | OXYGEN SATURATION: 98 %

## 2023-03-28 DIAGNOSIS — J38.6 SUBGLOTTIC STENOSIS: Primary | ICD-10-CM

## 2023-03-28 DIAGNOSIS — R06.02 SHORTNESS OF BREATH: ICD-10-CM

## 2023-03-28 LAB
CRP SERPL-MCNC: 3.69 MG/L
ERYTHROCYTE [SEDIMENTATION RATE] IN BLOOD BY WESTERGREN METHOD: 13 MM/HR (ref 0–20)
HBA1C MFR BLD: 6.1 %
RETINOPATHY: NEGATIVE

## 2023-03-28 PROCEDURE — 86036 ANCA SCREEN EACH ANTIBODY: CPT | Mod: 90 | Performed by: PATHOLOGY

## 2023-03-28 PROCEDURE — 86140 C-REACTIVE PROTEIN: CPT | Performed by: PATHOLOGY

## 2023-03-28 PROCEDURE — 86038 ANTINUCLEAR ANTIBODIES: CPT | Mod: 90 | Performed by: PATHOLOGY

## 2023-03-28 PROCEDURE — 83036 HEMOGLOBIN GLYCOSYLATED A1C: CPT | Mod: 90 | Performed by: PATHOLOGY

## 2023-03-28 PROCEDURE — 85652 RBC SED RATE AUTOMATED: CPT | Performed by: PATHOLOGY

## 2023-03-28 PROCEDURE — 83876 ASSAY MYELOPEROXIDASE: CPT | Mod: 90 | Performed by: PATHOLOGY

## 2023-03-28 PROCEDURE — 31575 DIAGNOSTIC LARYNGOSCOPY: CPT | Performed by: OTOLARYNGOLOGY

## 2023-03-28 PROCEDURE — 36415 COLL VENOUS BLD VENIPUNCTURE: CPT | Performed by: PATHOLOGY

## 2023-03-28 PROCEDURE — 86037 ANCA TITER EACH ANTIBODY: CPT | Mod: 90 | Performed by: PATHOLOGY

## 2023-03-28 PROCEDURE — 83516 IMMUNOASSAY NONANTIBODY: CPT | Mod: 90 | Performed by: PATHOLOGY

## 2023-03-28 PROCEDURE — 99000 SPECIMEN HANDLING OFFICE-LAB: CPT | Performed by: PATHOLOGY

## 2023-03-28 PROCEDURE — 99204 OFFICE O/P NEW MOD 45 MIN: CPT | Mod: 25 | Performed by: OTOLARYNGOLOGY

## 2023-03-28 RX ORDER — METHYLPREDNISOLONE 4 MG
TABLET, DOSE PACK ORAL
Qty: 21 TABLET | Refills: 0 | Status: SHIPPED | OUTPATIENT
Start: 2023-03-28 | End: 2023-04-06

## 2023-03-28 ASSESSMENT — PAIN SCALES - GENERAL: PAINLEVEL: NO PAIN (0)

## 2023-03-28 NOTE — LETTER
3/28/2023       RE: Anuradha Brown  [address] [address]     Dear Colleague,    Thank you for referring your patient, Anuradha Brown, to the [facility] [place] at [facility]. Please see a copy of my visit note below.        [facility]   at the [facility]   Otolaryngology Clinic     Patient: Anuradha Brown    MRN: [id number]    : 1980    Age/Gender: 42 year old female  Date of Service: 3/28/2023  Rendering Provider:   Haylie Polo MD     Referring Provider   PCP: Inocente Gutierrez  Referring Physician: No referring provider defined for this encounter.  Reason for Consultation   Dyspnea  History   HISTORY OF PRESENT ILLNESS: Ms. Brown is a 42 year old female who presents to us today with dyspnea.      She presents today for evaluation. She reports:  - no history of surgery  - has diabetes, takes Ozempic  - most recent A1C 6.5  - denies autoimmune issues    Dysphonia  - voice gets raspy with prolonged talking    Dysphagia  - denies    Dyspnea  - this started several years ago  - around COVID time  - even getting dressed in the morning, get out of breath  - hears whistling  - walking up stairs causes shortness of breath  - woke up middle night, couldn't breath, had to go to ER  - inhalers haven't helped despite taking it twice a day  - out of breath with prolonged talking  - coughed up phlegm on prednisone  - feels symptoms have worsened recently  - couldn't complete a yawn before due to shortness of breath  - now, after inhalers, able to do so     Throat clearing/cough  - 2 years of this  - gradually worsening, especially with exertion  - cold air triggers cough  - was on steroid inhalers  - able to sometimes control cough    GERD/LPRD   - denies heartburn  - does not wake up at night with coughing  - denies waking up with sore throat    PAST MEDICAL HISTORY:   Past Medical  History:   Diagnosis Date     Other and unspecified ovarian cyst 9/23/98    ultrasound done       PAST SURGICAL HISTORY: No past surgical history on file.    CURRENT MEDICATIONS:   Current Outpatient Medications:      albuterol (PROAIR HFA/PROVENTIL HFA/VENTOLIN HFA) 108 (90 Base) MCG/ACT inhaler, Inhale 2 puffs into the lungs every 4 hours as needed for shortness of breath or wheezing, Disp: 18 g, Rfl: 11     budesonide-formoterol (SYMBICORT) 160-4.5 MCG/ACT Inhaler, Inhale 2 puffs into the lungs 2 times daily, Disp: 6 g, Rfl: 11     OZEMPIC, 1 MG/DOSE, 4 MG/3ML SOPN, INJECT 1MG SUBCUTANEOUSLY EVERY WEEK *rotate injection sites*, Disp: 3 mL, Rfl: 6     Spacer/Aero-Holding Chambers (AEROCHAMBER WITH MOUTHPIECE) MISC, USE AS DIRECTED WITH VENTOLIN INHALER, Disp: , Rfl:     ALLERGIES: No known drug allergies    SOCIAL HISTORY:    Social History     Socioeconomic History     Marital status:      Spouse name: Not on file     Number of children: Not on file     Years of education: Not on file     Highest education level: Not on file   Occupational History     Not on file   Tobacco Use     Smoking status: Never     Smokeless tobacco: Never   Vaping Use     Vaping Use: Never used   Substance and Sexual Activity     Alcohol use: Not on file     Drug use: Not on file     Sexual activity: Not on file   Other Topics Concern     Not on file   Social History Narrative     Not on file     Social Determinants of Health     Financial Resource Strain: Not on file   Food Insecurity: Not on file   Transportation Needs: Not on file   Physical Activity: Not on file   Stress: Not on file   Social Connections: Not on file   Intimate Partner Violence: Not on file   Housing Stability: Not on file         FAMILY HISTORY: No family history on file.  Non-contributory for problems with anesthesia    REVIEW OF SYSTEMS:   The patient was asked a 14 point review of systems regarding constitutional symptoms, eye symptoms, ears, nose, mouth,  throat symptoms, cardiovascular symptoms, respiratory symptoms, gastrointestinal symptoms, genitourinary symptoms, musculoskeletal symptoms, integumentary symptoms, neurological symptoms, psychiatric symptoms, endocrine symptoms, hematologic/lymphatic symptoms, and allergic/ immunologic symptoms.   The pertinent factors have been included in the HPI and below.  Patient Supplied Answers to Review of Systems  [facility] ENT ROS 3/23/2023   Constitutional: Weight gain   Neurology: Numbness   Ears, Nose, Throat: Hoarseness   Cardiopulmonary: Cough, Breathing problems, Wheezing   Allergy/Immunology: Allergies or hay fever       Physical Examination   The patient underwent a physical examination as described below. The pertinent positive and negative findings are summarized after the description of the examination.  Constitutional: The patient's developmental and nutritional status was assessed. The patient's voice quality was assessed.  Head and Face: The head and face were inspected for deformities. The sinuses were palpated. The salivary glands were palpated. Facial muscle strength was assessed bilaterally.  Eyes: Extraocular movements and primary gaze alignment were assessed.  Ears, Nose, Mouth and Throat: The ears and nose were examined for deformities. The nasal septum, mucosa, and turbinates were inspected by anterior rhinoscopy. The lips, teeth, and gums were examined for abnormalities. The oral mucosa, tongue, palate, tonsils, lateral and posterior pharynx were inspected for the presence of asymmetry or mucosal lesions.    Neck: The tracheal position was noted, and the neck mass palpated to determine if there were any asymmetries, abnormal neck masses, thyromegally, or thyroid nodules.  Respiratory: The nature of the breathing and chest expansion/symmetry was observed.  Cardiovascular: The patient was examined to determine the presence of any edema or jugular venous distension.  Abdomen: The contour of the abdomen was  noted.  Lymphatic: The patient was examined for infraclavicular lymphadenopathy.  Musculoskeletal: The patient was inspected for the presence of skeletal deformities.  Extremities: The extremities were examined for any clubbing or cyanosis.  Skin: The skin was examined for inflammatory or neoplastic conditions.  Neurologic: The patient's orientation, mood, and affect were noted. The cranial nerve  functions were examined.  Other pertinent positive and negative findings on physical examination:      OC/OP: no lesions, uvula midline, soft palate elevates symmetrically   Neck: no lesions, no TH tenderness to palpation  All other physical examination findings were within normal limits and noncontributory.  Procedures   Flexible laryngoscopy (CPT 94197)      Pre-procedure diagnosis: subglottic stenosis  Post-procedure diagnosis: same as above  Indication for procedure: Ms. Brown is a 42 year old female with see above  Procedure(s): Fiberoptic Laryngoscopy    Details of Procedure: After informed consent was obtained, the patient was seated in the examination chair.  The areas of the nasopharynx as well as the hypopharynx were anesthetized with topical 4% lidocaine with 0.25% phenylephrine atomizer.  Examination of the base of tongue was performed first.  Attention was directed to any evidence of masses in the area or evidence of leukoplakia or candidal infection.  Attention was directed to the epiglottis where its size and position was determined and its movement on phonation of the vowel  e .  The piriform sinuses were then inspected for any mass lesions or pooling of secretions.  Attention was then directed to the larynx. The vocal folds were inspected for infection or any areas of leukoplakia, for masses, polypoid degeneration, or hemorrhage.  Having done this, the arytenoids and vocal processes were inspected for erythema or evidence of granuloma formation.  The posterior commissure was then inspected for evidence of  "inflammatory changes in the mucosa and heaping up of mucosal tissue. The patient was then instructed to say the vowel  e .  Adduction of vocal folds to the midline was observed for any evidence of paresis or paralysis of the larynx or asymmetry in rotation of the larynx to the left or right. The patient was asked to breathe and the degree of abduction was noted bilaterally.  Subglottic view of the larynx was obtained for any additional mass lesions or mucosal changes.  Finally the post cricoid was examined for evidence of pooling of secretions, as well as the pharyngeal wall mucosa.   Anesthesia type: 0.25% phenylephrine    Findings:  Anatomic/physiological deviations: RNC, grade 3 80% stenosis   Right vocal process: No restriction of mobility   Left vocal process: No restriction of mobility  Glottal gap: Complete glottal closure  Supraglottic structures: Normal  Hypopharynx: Normal     Estimated Blood Loss: minimal  Complications: None  Disposition: Patient tolerated the procedure well          Review of Relevant Clinical Data   I personally reviewed:  Notes:   3/24/23 - note Flor Staples, SLP  VOICE    Sometimes gets hoarse for some unknown reason, probably associated with t alking a lot    gernerall speaking voice is OK     COUGH/THROAT CLEARING    Onset/ inciting factors: \"myabe always had a chronic cough;\" long-term    Progression: stable    History of intervention:  ? Coughed a lot more when she was on the prednisone; this resolved    Current Symptoms:  ? Daily cough and throat clear  ? Dry; feels like upper airway, not lungs    Improves with: nothing; doesn't take anything for it   ? Maybe gum or salivary stimulation    Worsens with: unknown     SWALLOWING    No complaints     BREATHING    Onset/ inciting factors: gradual onset of breathing problems several years ago, probably around COVID time, but knew she didn't have COIVD  ? Felt her breathing was noisy, which she thought was in her " throat  ? Mentioned it at her yearly physical (2021); started being t reated for asthma  ? November 2022 a nocturnal episode of probable laryngospasm; also couldn't talk  - Didn't use inhaler because she was sure it wouldn't help  - No more nocturnal episodes  - Went to ER; two nebs, gave prednisone; sent away  ? Continued to say it's not getting better    Progression: unclear; still using a steroid inhaler    History of intervention:  ? Treated for asthma for past two years with no benefit  - Treated with prednisone and t hen inhalers  ? No previous Hx asthma  ? Had b een using expiratory muscle strength traineer, but couldn't get good enough  ? Follow-up with pulmonologists; now flow volume loops show flattened inspiratory loops  - told to discontinue EMST  ? Referred to allergy and ENT  ? Allergy workup showed seasonal spring allergies; one Dr. Zulema Vela, Dr. Tuttle said don't take it  ? No ENT workup yet    Current Symptoms:   ? Never chest pain; never feels anything in her lungs  ? Breathing is noisy even with light exertion like walking up steps; can't go to gym  ? Endorses and demonstrates stridor  - Denies wheezing  - There's a whistling sounds when she gets dressed  ? For a while, couldn't do a full yawn; she would gag; now she can gag since being on steroid inhalers  ? Both breathing in and out are hard    Improves with: resting    Worsens with: talking, moving, sometimes eating    Has learned to live with a lot of these symptoms    1/30/23 - ALLERGY OV with Dr. Tuttle  HPI:  This is a pleasant 42-year-old woman I was asked to see for evaluation by Thais Horton here today for evaluation of allergies.  Patient states as an adult, she has developed allergies and asthma.  She states she never had them growing up.  She states during the spring she will have sneezing nasal congestion especially when they are trying to open her.  She takes claritin as needed but she is not sure if this helps if she states  she does not like to take medication regularly.  No nasal sprays or rinses.  Recently was suggested to her to try montelukast but she has not yet picked this up from the pharmacy.  She has been diagnosed with asthma.  She states this occurred as an adult as well.  She is currently under the evaluation of pulmonary for this.  Of note, she was in the emergency room in November for an asthma exacerbation.  This was thought to be triggered by an upper respiratory tract infection.  She is currently using Advair 250/50 1 puff twice daily, however, she is going to transition to Symbicort.  She has an albuterol inhaler reports this does not seem to help.  She states she notes breathlessness frequently and wheezing with coming from her throat rather than her chest.  She states when her breathing is at its worst it can wake her up from sleep, however, this is rare.  She states that even walking from the parking lot on a cold day like this will trigger her symptoms and she will have some difficulty recovering.  She states she cannot walk up stairs due to the breathlessness and wheezing.  She is not sure if controller medications are helping.  Recent pulmonary function test demonstrated normal lung function and flattening of the inspiratory limb.  She has a referral to ENT.    Plan:  1.  Allergic rhinitis to tree pollen  2.  Shortness of breath/wheezing     Allergy testing was positive only for tree pollen.  For this reason, I do not think she is to start montelukast at this time.  Recommended cetirizine 10 mg during the spring.  She will continue to follow with pulmonary physicians.  She has more upper airway symptoms I suspect either an upper airway lesion versus vocal cord dysfunction is causing the majority of her breathing symptoms. She will follow-up with ENT. Follow as needed.    1/27/23 - PULM OV with Dr. Mack  Plan:  42 year old female with a history of asthma, seasonal allergies, DM II - presenting to establish care  and address ongoing cough, wheezing, SOB.       Moderate persistent asthma  Pulmonary function testing without obstructive or restrictive process, DLCO elevated.  Worsening cough, wheezing, SOB over past 5 years.  Worsening seasonal allergies as well.  ACT score 19, improved from 11 in December.  Currently on Advair 250, recently increased 2 weeks ago.  Plan:  - Finish Advair inhaler twice daily.  After this is finished, switch to Symbicort 160/4.5 two puffs BID, rinse mouth after use.  - continue albuterol PRN  - need to control allergies, see below  - use peak flow meter, bring numbers to next visit  - she is due for annual influenza and COVID bivalent booster, recommended these today, she will look into this.     Seasonal allergies  No hx of allergies, but has had worsening itchy eyes, sinus congestion, cough, wheezing.  Worse in spring.  Moved into older house 5 years ago, symptoms have worsened since then.  Plan:  - allergy referral  - start singulair 10mg daily  - start flonase  - trial saline rinses/spray     Flattening of inspiratory flow loop  Noted per PFTs today.  She has had symptoms of tightening in throat.  Inspiratory wheezes noted per exam today, as well as stridor in upper airway.  No hx of surgeries, intubation.  Concern for vocal cord dysfunction.  Plan:  - referral to ENT  - high res chest CT    Radiology:   1/30/23 - CT Chest  IMPRESSION:   1.  No findings to explain symptoms.  2.  No interstitial lung disease.  3.  No significant airway findings by CT.    The images were reviewed and interpreted of CT chest 1/30/23.    CT chest does not cut high enough to subglottis    Procedures:   1/27/23 - PFT  Impression:  Full Pulmonary Function Test is abnormal. Spirometry is normal. Lung volumes normal. Diffusion capacity normal.     Flow volume loop shows flattening of inspiratory limb suggesting extrathoracic variable obstruction. Investigation of vocal cords is appropriate. Stridor heard during exam.  "    Labs:  Lab Results   Component Value Date    TSH 1.06 08/13/2021     Lab Results   Component Value Date     04/29/2022    CO2 26 04/29/2022    BUN 12 04/29/2022     Lab Results   Component Value Date    WBC 5.5 04/29/2022    HGB 14.5 01/27/2023    HCT 43.6 04/29/2022    MCV 90 04/29/2022     04/29/2022     No results found for: PT, PTT, INR  No results found for: DRAKE  No components found for: RHEUMATOIDFACTOR,  RF  No results found for: CRP  No components found for: CKTOT, URICACID  No components found for: C3, C4, DSDNAAB, NDNAABIFA  No results found for: MPOAB    Patient reported Quality of Life (QOL) Measures   Patient Supplied Answers To VHI Questionnaire  Voice Handicap Index (VHI-10) 3/23/2023   My voice makes it difficult for people to hear me 2   People have difficulty understanding me in a noisy room 0   My voice difficulties restrict my personal and social life.  1   I feel left out of conversations because of my voice 0   My voice problem causes me to lose income 0   I feel as though I have to strain to produce voice 2   The clarity of my voice is unpredictable 2   My voice problem upsets me 0   My voice makes me feel handicapped 0   People ask, \"What's wrong with your voice?\" 0   VHI-10 7         Patient Supplied Answers To EAT Questionnaire  No flowsheet data found.      Patient Supplied Answers To CSI Questionnaire  No flowsheet data found.      Patient Supplied Answers to Dyspnea Index Questionnaire:  Dyspnea Index 3/23/2023   1. I have trouble getting air in. 1   2. I feel tightness in my throat when I am having boni breathing problem. 2   3. It takes more effort to breathe than it used to. 3   4. Change in weather affect my breathing problem. 3   5. My breathing gets worse with stress. 2   6. I make sound/noise breathing in 1   7. I have to strain to breathe. 2   8. My shortness of breath gets worse with exercise or physical activity 4   9. My breathing problem makes me feel " stressed. 2   10. My breathing problem casuses me to restrict my personal and social life. 2   Dyspnea Index Total Score 22       Impression & Plan     IMPRESSION: Ms. Brown is a 42 year old female who is being seen for the followin. Dyspnea  - due to subglottic stenosis   - no intubation history   - has diabetes, on Ozempic, most recent A1C is 6.5 (22)  - denies history of autoimmune disease  - CT chest 23 normal  - PFTs 23 is abnormal, stridor heard on exam  - denies reflux   - BMI is 40  - patient symptoms with shortness of breath with exertion and prolonged talking, one severe episode woke her from sleep and resulted in ER visit, on steroid inhalers but doesn't help  - scope shows grade 3 80% stenosis  - discussed etiology of trauma (intubation), autoimmune, diabetes or idiopathic. In this case this is most likely idiopathic given no prior intubation, but also has component of diabetes, need to rule out autoimmune issues  - discussed treatment with observation, conservative management with oral abx/steroids/reflux precautions, steroid injections, endoscopic procedures, open resection and bypass procedure with tracheotomy  - patient elects to proceed with endoscopic surgery  Plan  - schedule surgery  - will obtain autoimmune labs  - Gi/reflux denies, will need to address at follow up  - weight management, will need to address at follow up  - idiopathic subglottic stenosis Facebook group  - augmentin/medrol if needed for worsening breathing     RETURN VISIT: after surgery    Thank you for the kind referral and for allowing me to share in the care of Ms. Brown. If you have any questions, please do not hesitate to contact me.    Haylie Polo MD    Laryngology    [facility]  Department of  Otolaryngology - Head and Neck Surgery  Clinics & [facility] Center  [address] [place] [id number]  Appointment line: [phone]  Fax:  [phone]  [email or web address]       I, Maria Esther Yost, am serving as a scribe to document services personally performed by Haylie Polo MD at this visit, based upon the provider's statements to me. All documentation has been reviewed by the aforementioned provider prior to being entered into the official medical record.         Again, thank you for allowing me to participate in the care of your patient.      Sincerely,    Haylie Polo MD

## 2023-03-28 NOTE — PATIENT INSTRUCTIONS
1.  You were seen in the ENT Clinic today by Dr. Polo. If you have any questions or concerns after your appointment, please call [phone]. Press option #1 for scheduling related needs. Press option #3 for Nurse advice.    2.  Dr. Ploo has recommended the following:   - Surgery 4/19   -Augmentin/Medrol as needed before the surgery   -[employer] support group    3.  Plan is to return to clinic 5/18 at 1:00 pm      Suzanne Ochoa  [phone]  [facility] - Otolaryngology

## 2023-03-28 NOTE — PROGRESS NOTES
[facility]   at the [facility]   Otolaryngology Clinic     Patient: Anuradha Brown    MRN: [id number]    : 1980    Age/Gender: 42 year old female  Date of Service: 3/28/2023  Rendering Provider:   Haylie Polo MD     Referring Provider   PCP: Inocente Gutierrez  Referring Physician: No referring provider defined for this encounter.  Reason for Consultation   Dyspnea  History   HISTORY OF PRESENT ILLNESS: Ms. Brown is a 42 year old female who presents to us today with dyspnea.      She presents today for evaluation. She reports:  - no history of surgery  - has diabetes, takes Ozempic  - most recent A1C 6.5  - denies autoimmune issues    Dysphonia  - voice gets raspy with prolonged talking    Dysphagia  - denies    Dyspnea  - this started several years ago  - around COVID time  - even getting dressed in the morning, get out of breath  - hears whistling  - walking up stairs causes shortness of breath  - woke up middle night, couldn't breath, had to go to ER  - inhalers haven't helped despite taking it twice a day  - out of breath with prolonged talking  - coughed up phlegm on prednisone  - feels symptoms have worsened recently  - couldn't complete a yawn before due to shortness of breath  - now, after inhalers, able to do so     Throat clearing/cough  - 2 years of this  - gradually worsening, especially with exertion  - cold air triggers cough  - was on steroid inhalers  - able to sometimes control cough    GERD/LPRD   - denies heartburn  - does not wake up at night with coughing  - denies waking up with sore throat    PAST MEDICAL HISTORY:   Past Medical History:   Diagnosis Date     Other and unspecified ovarian cyst 98    ultrasound done       PAST SURGICAL HISTORY: No past surgical history on file.    CURRENT MEDICATIONS:   Current Outpatient Medications:      albuterol (PROAIR HFA/PROVENTIL HFA/VENTOLIN HFA) 108 (90 Base) MCG/ACT inhaler, Inhale 2 puffs into the  lungs every 4 hours as needed for shortness of breath or wheezing, Disp: 18 g, Rfl: 11     budesonide-formoterol (SYMBICORT) 160-4.5 MCG/ACT Inhaler, Inhale 2 puffs into the lungs 2 times daily, Disp: 6 g, Rfl: 11     OZEMPIC, 1 MG/DOSE, 4 MG/3ML SOPN, INJECT 1MG SUBCUTANEOUSLY EVERY WEEK *rotate injection sites*, Disp: 3 mL, Rfl: 6     Spacer/Aero-Holding Chambers (AEROCHAMBER WITH MOUTHPIECE) MISC, USE AS DIRECTED WITH VENTOLIN INHALER, Disp: , Rfl:     ALLERGIES: No known drug allergies    SOCIAL HISTORY:    Social History     Socioeconomic History     Marital status:      Spouse name: Not on file     Number of children: Not on file     Years of education: Not on file     Highest education level: Not on file   Occupational History     Not on file   Tobacco Use     Smoking status: Never     Smokeless tobacco: Never   Vaping Use     Vaping Use: Never used   Substance and Sexual Activity     Alcohol use: Not on file     Drug use: Not on file     Sexual activity: Not on file   Other Topics Concern     Not on file   Social History Narrative     Not on file     Social Determinants of Health     Financial Resource Strain: Not on file   Food Insecurity: Not on file   Transportation Needs: Not on file   Physical Activity: Not on file   Stress: Not on file   Social Connections: Not on file   Intimate Partner Violence: Not on file   Housing Stability: Not on file         FAMILY HISTORY: No family history on file.  Non-contributory for problems with anesthesia    REVIEW OF SYSTEMS:   The patient was asked a 14 point review of systems regarding constitutional symptoms, eye symptoms, ears, nose, mouth, throat symptoms, cardiovascular symptoms, respiratory symptoms, gastrointestinal symptoms, genitourinary symptoms, musculoskeletal symptoms, integumentary symptoms, neurological symptoms, psychiatric symptoms, endocrine symptoms, hematologic/lymphatic symptoms, and allergic/ immunologic symptoms.   The pertinent factors  have been included in the HPI and below.  Patient Supplied Answers to Review of Systems   ENT ROS 3/23/2023   Constitutional: Weight gain   Neurology: Numbness   Ears, Nose, Throat: Hoarseness   Cardiopulmonary: Cough, Breathing problems, Wheezing   Allergy/Immunology: Allergies or hay fever       Physical Examination   The patient underwent a physical examination as described below. The pertinent positive and negative findings are summarized after the description of the examination.  Constitutional: The patient's developmental and nutritional status was assessed. The patient's voice quality was assessed.  Head and Face: The head and face were inspected for deformities. The sinuses were palpated. The salivary glands were palpated. Facial muscle strength was assessed bilaterally.  Eyes: Extraocular movements and primary gaze alignment were assessed.  Ears, Nose, Mouth and Throat: The ears and nose were examined for deformities. The nasal septum, mucosa, and turbinates were inspected by anterior rhinoscopy. The lips, teeth, and gums were examined for abnormalities. The oral mucosa, tongue, palate, tonsils, lateral and posterior pharynx were inspected for the presence of asymmetry or mucosal lesions.    Neck: The tracheal position was noted, and the neck mass palpated to determine if there were any asymmetries, abnormal neck masses, thyromegally, or thyroid nodules.  Respiratory: The nature of the breathing and chest expansion/symmetry was observed.  Cardiovascular: The patient was examined to determine the presence of any edema or jugular venous distension.  Abdomen: The contour of the abdomen was noted.  Lymphatic: The patient was examined for infraclavicular lymphadenopathy.  Musculoskeletal: The patient was inspected for the presence of skeletal deformities.  Extremities: The extremities were examined for any clubbing or cyanosis.  Skin: The skin was examined for inflammatory or neoplastic  conditions.  Neurologic: The patient's orientation, mood, and affect were noted. The cranial nerve  functions were examined.  Other pertinent positive and negative findings on physical examination:      OC/OP: no lesions, uvula midline, soft palate elevates symmetrically   Neck: no lesions, no TH tenderness to palpation  All other physical examination findings were within normal limits and noncontributory.  Procedures   Flexible laryngoscopy (CPT 36463)      Pre-procedure diagnosis: subglottic stenosis  Post-procedure diagnosis: same as above  Indication for procedure: Ms. Brown is a 42 year old female with see above  Procedure(s): Fiberoptic Laryngoscopy    Details of Procedure: After informed consent was obtained, the patient was seated in the examination chair.  The areas of the nasopharynx as well as the hypopharynx were anesthetized with topical 4% lidocaine with 0.25% phenylephrine atomizer.  Examination of the base of tongue was performed first.  Attention was directed to any evidence of masses in the area or evidence of leukoplakia or candidal infection.  Attention was directed to the epiglottis where its size and position was determined and its movement on phonation of the vowel  e .  The piriform sinuses were then inspected for any mass lesions or pooling of secretions.  Attention was then directed to the larynx. The vocal folds were inspected for infection or any areas of leukoplakia, for masses, polypoid degeneration, or hemorrhage.  Having done this, the arytenoids and vocal processes were inspected for erythema or evidence of granuloma formation.  The posterior commissure was then inspected for evidence of inflammatory changes in the mucosa and heaping up of mucosal tissue. The patient was then instructed to say the vowel  e .  Adduction of vocal folds to the midline was observed for any evidence of paresis or paralysis of the larynx or asymmetry in rotation of the larynx to the left or right. The  "patient was asked to breathe and the degree of abduction was noted bilaterally.  Subglottic view of the larynx was obtained for any additional mass lesions or mucosal changes.  Finally the post cricoid was examined for evidence of pooling of secretions, as well as the pharyngeal wall mucosa.   Anesthesia type: 0.25% phenylephrine    Findings:  Anatomic/physiological deviations: RNC, grade 3 80% stenosis   Right vocal process: No restriction of mobility   Left vocal process: No restriction of mobility  Glottal gap: Complete glottal closure  Supraglottic structures: Normal  Hypopharynx: Normal     Estimated Blood Loss: minimal  Complications: None  Disposition: Patient tolerated the procedure well          Review of Relevant Clinical Data   I personally reviewed:  Notes:   3/24/23 - note Flor Staples, SLP  VOICE    Sometimes gets hoarse for some unknown reason, probably associated with t alking a lot    gernerall speaking voice is OK     COUGH/THROAT CLEARING    Onset/ inciting factors: \"myabe always had a chronic cough;\" long-term    Progression: stable    History of intervention:  ? Coughed a lot more when she was on the prednisone; this resolved    Current Symptoms:  ? Daily cough and throat clear  ? Dry; feels like upper airway, not lungs    Improves with: nothing; doesn't take anything for it   ? Maybe gum or salivary stimulation    Worsens with: unknown     SWALLOWING    No complaints     BREATHING    Onset/ inciting factors: gradual onset of breathing problems several years ago, probably around COVID time, but knew she didn't have COIVD  ? Felt her breathing was noisy, which she thought was in her throat  ? Mentioned it at her yearly physical (2021); started being t reated for asthma  ? November 2022 a nocturnal episode of probable laryngospasm; also couldn't talk  - Didn't use inhaler because she was sure it wouldn't help  - No more nocturnal episodes  - Went to ER; two nebs, gave prednisone; sent " away  ? Continued to say it's not getting better    Progression: unclear; still using a steroid inhaler    History of intervention:  ? Treated for asthma for past two years with no benefit  - Treated with prednisone and t hen inhalers  ? No previous Hx asthma  ? Had b een using expiratory muscle strength traineer, but couldn't get good enough  ? Follow-up with pulmonologists; now flow volume loops show flattened inspiratory loops  - told to discontinue EMST  ? Referred to allergy and ENT  ? Allergy workup showed seasonal spring allergies; one Dr. Zulema Vela, Dr. Tuttle said don't take it  ? No ENT workup yet    Current Symptoms:   ? Never chest pain; never feels anything in her lungs  ? Breathing is noisy even with light exertion like walking up steps; can't go to gym  ? Endorses and demonstrates stridor  - Denies wheezing  - There's a whistling sounds when she gets dressed  ? For a while, couldn't do a full yawn; she would gag; now she can gag since being on steroid inhalers  ? Both breathing in and out are hard    Improves with: resting    Worsens with: talking, moving, sometimes eating    Has learned to live with a lot of these symptoms    1/30/23 - ALLERGY OV with Dr. Tuttle  HPI:  This is a pleasant 42-year-old woman I was asked to see for evaluation by Thais Horton here today for evaluation of allergies.  Patient states as an adult, she has developed allergies and asthma.  She states she never had them growing up.  She states during the spring she will have sneezing nasal congestion especially when they are trying to open her.  She takes claritin as needed but she is not sure if this helps if she states she does not like to take medication regularly.  No nasal sprays or rinses.  Recently was suggested to her to try montelukast but she has not yet picked this up from the pharmacy.  She has been diagnosed with asthma.  She states this occurred as an adult as well.  She is currently under the evaluation of  pulmonary for this.  Of note, she was in the emergency room in November for an asthma exacerbation.  This was thought to be triggered by an upper respiratory tract infection.  She is currently using Advair 250/50 1 puff twice daily, however, she is going to transition to Symbicort.  She has an albuterol inhaler reports this does not seem to help.  She states she notes breathlessness frequently and wheezing with coming from her throat rather than her chest.  She states when her breathing is at its worst it can wake her up from sleep, however, this is rare.  She states that even walking from the parking lot on a cold day like this will trigger her symptoms and she will have some difficulty recovering.  She states she cannot walk up stairs due to the breathlessness and wheezing.  She is not sure if controller medications are helping.  Recent pulmonary function test demonstrated normal lung function and flattening of the inspiratory limb.  She has a referral to ENT.    Plan:  1.  Allergic rhinitis to tree pollen  2.  Shortness of breath/wheezing     Allergy testing was positive only for tree pollen.  For this reason, I do not think she is to start montelukast at this time.  Recommended cetirizine 10 mg during the spring.  She will continue to follow with pulmonary physicians.  She has more upper airway symptoms I suspect either an upper airway lesion versus vocal cord dysfunction is causing the majority of her breathing symptoms. She will follow-up with ENT. Follow as needed.    1/27/23 - PULM OV with Dr. Mack  Plan:  42 year old female with a history of asthma, seasonal allergies, DM II - presenting to establish care and address ongoing cough, wheezing, SOB.       Moderate persistent asthma  Pulmonary function testing without obstructive or restrictive process, DLCO elevated.  Worsening cough, wheezing, SOB over past 5 years.  Worsening seasonal allergies as well.  ACT score 19, improved from 11 in December.   Currently on Advair 250, recently increased 2 weeks ago.  Plan:  - Finish Advair inhaler twice daily.  After this is finished, switch to Symbicort 160/4.5 two puffs BID, rinse mouth after use.  - continue albuterol PRN  - need to control allergies, see below  - use peak flow meter, bring numbers to next visit  - she is due for annual influenza and COVID bivalent booster, recommended these today, she will look into this.     Seasonal allergies  No hx of allergies, but has had worsening itchy eyes, sinus congestion, cough, wheezing.  Worse in spring.  Moved into older house 5 years ago, symptoms have worsened since then.  Plan:  - allergy referral  - start singulair 10mg daily  - start flonase  - trial saline rinses/spray     Flattening of inspiratory flow loop  Noted per PFTs today.  She has had symptoms of tightening in throat.  Inspiratory wheezes noted per exam today, as well as stridor in upper airway.  No hx of surgeries, intubation.  Concern for vocal cord dysfunction.  Plan:  - referral to ENT  - high res chest CT    Radiology:   1/30/23 - CT Chest  IMPRESSION:   1.  No findings to explain symptoms.  2.  No interstitial lung disease.  3.  No significant airway findings by CT.    The images were reviewed and interpreted of CT chest 1/30/23.    CT chest does not cut high enough to subglottis    Procedures:   1/27/23 - PFT  Impression:  Full Pulmonary Function Test is abnormal. Spirometry is normal. Lung volumes normal. Diffusion capacity normal.     Flow volume loop shows flattening of inspiratory limb suggesting extrathoracic variable obstruction. Investigation of vocal cords is appropriate. Stridor heard during exam.     Labs:  Lab Results   Component Value Date    TSH 1.06 08/13/2021     Lab Results   Component Value Date     04/29/2022    CO2 26 04/29/2022    BUN 12 04/29/2022     Lab Results   Component Value Date    WBC 5.5 04/29/2022    HGB 14.5 01/27/2023    HCT 43.6 04/29/2022    MCV 90 04/29/2022  "    2022     No results found for: PT, PTT, INR  No results found for: DRAKE  No components found for: RHEUMATOIDFACTOR,  RF  No results found for: CRP  No components found for: CKTOT, URICACID  No components found for: C3, C4, DSDNAAB, NDNAABIFA  No results found for: MPOAB    Patient reported Quality of Life (QOL) Measures   Patient Supplied Answers To VHI Questionnaire  Voice Handicap Index (VHI-10) 3/23/2023   My voice makes it difficult for people to hear me 2   People have difficulty understanding me in a noisy room 0   My voice difficulties restrict my personal and social life.  1   I feel left out of conversations because of my voice 0   My voice problem causes me to lose income 0   I feel as though I have to strain to produce voice 2   The clarity of my voice is unpredictable 2   My voice problem upsets me 0   My voice makes me feel handicapped 0   People ask, \"What's wrong with your voice?\" 0   VHI-10 7         Patient Supplied Answers To EAT Questionnaire  No flowsheet data found.      Patient Supplied Answers To CSI Questionnaire  No flowsheet data found.      Patient Supplied Answers to Dyspnea Index Questionnaire:  Dyspnea Index 3/23/2023   1. I have trouble getting air in. 1   2. I feel tightness in my throat when I am having boni breathing problem. 2   3. It takes more effort to breathe than it used to. 3   4. Change in weather affect my breathing problem. 3   5. My breathing gets worse with stress. 2   6. I make sound/noise breathing in 1   7. I have to strain to breathe. 2   8. My shortness of breath gets worse with exercise or physical activity 4   9. My breathing problem makes me feel stressed. 2   10. My breathing problem casuses me to restrict my personal and social life. 2   Dyspnea Index Total Score 22       Impression & Plan     IMPRESSION: Ms. Brown is a 42 year old female who is being seen for the followin. Dyspnea  - due to subglottic stenosis   - no intubation history "   - has diabetes, on Ozempic, most recent A1C is 6.5 (12/2/22)  - denies history of autoimmune disease  - CT chest 1/30/23 normal  - PFTs 1/27/23 is abnormal, stridor heard on exam  - denies reflux   - BMI is 40  - patient symptoms with shortness of breath with exertion and prolonged talking, one severe episode woke her from sleep and resulted in ER visit, on steroid inhalers but doesn't help  - scope shows grade 3 80% stenosis  - discussed etiology of trauma (intubation), autoimmune, diabetes or idiopathic. In this case this is most likely idiopathic given no prior intubation, but also has component of diabetes, need to rule out autoimmune issues  - discussed treatment with observation, conservative management with oral abx/steroids/reflux precautions, steroid injections, endoscopic procedures, open resection and bypass procedure with tracheotomy  - patient elects to proceed with endoscopic surgery  Plan  - schedule surgery  - will obtain autoimmune labs  - Gi/reflux denies, will need to address at follow up  - weight management, will need to address at follow up  - idiopathic subglottic stenosis Facebook group  - augmentin/medrol if needed for worsening breathing     RETURN VISIT: after surgery    Thank you for the kind referral and for allowing me to share in the care of Ms. Brown. If you have any questions, please do not hesitate to contact me.    Haylie Polo MD    Laryngology    [facility]  Department of  Otolaryngology - Head and Neck Surgery  Clinics & [facility] Center  [address] [place] [address]  Appointment line: [phone]  Fax: [phone]  [email or web address]       Maria Esther ADKINS, am serving as a scribe to document services personally performed by Haylie Polo MD at this visit, based upon the provider's statements to me. All documentation has been reviewed by the aforementioned provider prior to being entered  into the official medical record.

## 2023-03-29 LAB
ANA SER QL IF: NEGATIVE
ANCA AB PATTERN SER IF-IMP: NORMAL
C-ANCA TITR SER IF: NORMAL {TITER}

## 2023-03-31 LAB
MYELOPEROXIDASE AB SER IA-ACNC: <0.3 U/ML
MYELOPEROXIDASE AB SER IA-ACNC: NEGATIVE
PROTEINASE3 AB SER IA-ACNC: <1 U/ML
PROTEINASE3 AB SER IA-ACNC: NEGATIVE

## 2023-04-06 ENCOUNTER — OFFICE VISIT (OUTPATIENT)
Dept: FAMILY MEDICINE | Facility: CLINIC | Age: 43
End: 2023-04-06
Payer: COMMERCIAL

## 2023-04-06 VITALS
HEIGHT: 61 IN | BODY MASS INDEX: 40.4 KG/M2 | HEART RATE: 87 BPM | TEMPERATURE: 98 F | DIASTOLIC BLOOD PRESSURE: 66 MMHG | RESPIRATION RATE: 16 BRPM | WEIGHT: 214 LBS | SYSTOLIC BLOOD PRESSURE: 110 MMHG | OXYGEN SATURATION: 97 %

## 2023-04-06 DIAGNOSIS — E66.01 MORBID OBESITY (H): ICD-10-CM

## 2023-04-06 DIAGNOSIS — J38.6 SUBGLOTTIC STENOSIS NOT DUE TO SURGERY: Primary | ICD-10-CM

## 2023-04-06 DIAGNOSIS — F32.0 MILD MAJOR DEPRESSION (H): ICD-10-CM

## 2023-04-06 DIAGNOSIS — E11.9 TYPE 2 DIABETES MELLITUS WITHOUT COMPLICATION, WITHOUT LONG-TERM CURRENT USE OF INSULIN (H): ICD-10-CM

## 2023-04-06 DIAGNOSIS — Z01.818 PREOP GENERAL PHYSICAL EXAM: ICD-10-CM

## 2023-04-06 PROBLEM — J34.89 SINUS DRAINAGE: Status: ACTIVE | Noted: 2021-04-07

## 2023-04-06 PROBLEM — J02.9 SORE THROAT: Status: ACTIVE | Noted: 2021-04-07

## 2023-04-06 PROBLEM — R06.2 WHEEZING: Status: ACTIVE | Noted: 2021-04-07

## 2023-04-06 PROBLEM — Z20.822 EXPOSURE TO COVID-19 VIRUS: Status: ACTIVE | Noted: 2021-04-07

## 2023-04-06 LAB
ANION GAP SERPL CALCULATED.3IONS-SCNC: 12 MMOL/L (ref 7–15)
BUN SERPL-MCNC: 15.9 MG/DL (ref 6–20)
CALCIUM SERPL-MCNC: 9.7 MG/DL (ref 8.6–10)
CHLORIDE SERPL-SCNC: 105 MMOL/L (ref 98–107)
CREAT SERPL-MCNC: 0.99 MG/DL (ref 0.51–0.95)
DEPRECATED HCO3 PLAS-SCNC: 25 MMOL/L (ref 22–29)
ERYTHROCYTE [DISTWIDTH] IN BLOOD BY AUTOMATED COUNT: 12.3 % (ref 10–15)
GFR SERPL CREATININE-BSD FRML MDRD: 72 ML/MIN/1.73M2
GLUCOSE SERPL-MCNC: 135 MG/DL (ref 70–99)
HCT VFR BLD AUTO: 43.1 % (ref 35–47)
HGB BLD-MCNC: 14.3 G/DL (ref 11.7–15.7)
MCH RBC QN AUTO: 29.5 PG (ref 26.5–33)
MCHC RBC AUTO-ENTMCNC: 33.2 G/DL (ref 31.5–36.5)
MCV RBC AUTO: 89 FL (ref 78–100)
PLATELET # BLD AUTO: 322 10E3/UL (ref 150–450)
POTASSIUM SERPL-SCNC: 4.3 MMOL/L (ref 3.4–5.3)
RBC # BLD AUTO: 4.85 10E6/UL (ref 3.8–5.2)
SODIUM SERPL-SCNC: 142 MMOL/L (ref 136–145)
WBC # BLD AUTO: 6 10E3/UL (ref 4–11)

## 2023-04-06 PROCEDURE — 36415 COLL VENOUS BLD VENIPUNCTURE: CPT | Performed by: FAMILY MEDICINE

## 2023-04-06 PROCEDURE — 80048 BASIC METABOLIC PNL TOTAL CA: CPT | Performed by: FAMILY MEDICINE

## 2023-04-06 PROCEDURE — 85027 COMPLETE CBC AUTOMATED: CPT | Performed by: FAMILY MEDICINE

## 2023-04-06 PROCEDURE — 99214 OFFICE O/P EST MOD 30 MIN: CPT | Performed by: FAMILY MEDICINE

## 2023-04-06 RX ORDER — NEOMYCIN SULFATE, POLYMYXIN B SULFATE AND DEXAMETHASONE 3.5; 10000; 1 MG/ML; [USP'U]/ML; MG/ML
SUSPENSION/ DROPS OPHTHALMIC
COMMUNITY
Start: 2023-03-28 | End: 2023-11-16

## 2023-04-06 NOTE — PROGRESS NOTES
[address] [address]  Phone: [phone]  Fax: [phone]  Primary Provider: Constantine Gutierrez  Pre-op Performing Provider: CONSTANTINE GUTIERREZ       PREOPERATIVE EVALUATION:  Today's date: 4/6/2023    Anuradha Brown is a 42 year old female who presents for a preoperative evaluation.      4/6/2023    12:50 PM   Additional Questions   Roomed by SRud   Accompanied by n/a     Surgical Information:  Surgery/Procedure: Otolaryngology  Surgery Location: [facility]  Surgeon: Christ  Surgery Date: 04/19/2023  Time of Surgery: TBD  Where patient plans to recover: At home with family  Fax number for surgical facility: Note does not need to be faxed, will be available electronically in Epic.    Assessment & Plan     The proposed surgical procedure is considered INTERMEDIATE risk.    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    - CBC with platelets; Future  - Basic metabolic panel  (Ca, Cl, CO2, Creat, Gluc, K, Na, BUN); Future    Preop general physical exam    - CBC with platelets; Future  - Basic metabolic panel  (Ca, Cl, CO2, Creat, Gluc, K, Na, BUN); Future    Type 2 diabetes mellitus without complication, without long-term current use of insulin (H)  Stable Controlled    Morbid obesity (H)  stable    Mild major depression (H)  stable             Risks and Recommendations:  The patient has the following additional risks and recommendations for perioperative complications:   - No identified additional risk factors other than previously addressed    Medication Instructions:  Patient is to take all scheduled medications on the day of surgery    RECOMMENDATION:  APPROVAL GIVEN to proceed with proposed procedure pending review of diagnostic evaluation.    Subjective     HPI related to upcoming procedure: Increased wheezing not responsive to normal care.  Further investigation showed subglottic stenosis.  Now presents for surgical excision.        4/6/2023     12:44 PM   Preop Questions   1. Have you ever had a heart attack or stroke? No   2. Have you ever had surgery on your heart or blood vessels, such as a stent placement, a coronary artery bypass, or surgery on an artery in your head, neck, heart, or legs? No   3. Do you have chest pain with activity? No   4. Do you have a history of  heart failure? No   5. Do you currently have a cold, bronchitis or symptoms of other infection? UNKNOWN - No different than usual   6. Do you have a cough, shortness of breath, or wheezing? YES - No different than usual   7. Do you or anyone in your family have previous history of blood clots? No   8. Do you or does anyone in your family have a serious bleeding problem such as prolonged bleeding following surgeries or cuts? No   9. Have you ever had problems with anemia or been told to take iron pills? No   10. Have you had any abnormal blood loss such as black, tarry or bloody stools, or abnormal vaginal bleeding? No   11. Have you ever had a blood transfusion? No   12. Are you willing to have a blood transfusion if it is medically needed before, during, or after your surgery? Yes   13. Have you or any of your relatives ever had problems with anesthesia? No   14. Do you have sleep apnea, excessive snoring or daytime drowsiness? No   15. Do you have any artifical heart valves or other implanted medical devices like a pacemaker, defibrillator, or continuous glucose monitor? No   16. Do you have artificial joints? No   17. Are you allergic to latex? No   18. Is there any chance that you may be pregnant? No       Health Care Directive:  Patient does not have a Health Care Directive or Living Will: Discussed advance care planning with patient; information given to patient to review.    Preoperative Review of :   reviewed - no record of controlled substances prescribed.      Status of Chronic Conditions:  DEPRESSION - Patient has a long history of Depression of moderate severity  requiring medication for control with recent symptoms being stable..Current symptoms of depression include anxiety.     DIABETES - Patient has a longstanding history of DiabetesType Type II . Patient is being treated with Ozempic and denies significant side effects. Control has been good. Complicating factors include but are not limited to: none.       Review of Systems  CONSTITUTIONAL: NEGATIVE for fever, chills, change in weight  INTEGUMENTARY/SKIN: NEGATIVE for worrisome rashes, moles or lesions  EYES: NEGATIVE for vision changes or irritation  ENT/MOUTH: NEGATIVE for ear, mouth and throat problems  RESP:NEGATIVE for significant cough or SOB, cough-non productive and stridor  CV: NEGATIVE for chest pain, palpitations or peripheral edema  GI: NEGATIVE for nausea, abdominal pain, heartburn, or change in bowel habits  : NEGATIVE for frequency, dysuria, or hematuria  MUSCULOSKELETAL: NEGATIVE for significant arthralgias or myalgia  NEURO: NEGATIVE for weakness, dizziness or paresthesias  ENDOCRINE: NEGATIVE for temperature intolerance, skin/hair changes  HEME: NEGATIVE for bleeding problems  PSYCHIATRIC: NEGATIVE for changes in mood or affect    Patient Active Problem List    Diagnosis Date Noted     Mild intermittent asthma without complication 03/22/2023     Priority: Medium     Vocal cord disease 03/22/2023     Priority: Medium     Seasonal allergic rhinitis due to pollen 01/30/2023     Priority: Medium     Morbid obesity (H) 11/18/2022     Priority: Medium     Abnormal cytology finding 04/29/2022     Priority: Medium     History of varicella 04/29/2022     Priority: Medium     Mild major depression (H) 04/29/2022     Priority: Medium     Obesity 04/29/2022     Priority: Medium     Type 2 diabetes mellitus (H) 04/29/2022     Priority: Medium      Past Medical History:   Diagnosis Date     Other and unspecified ovarian cyst 9/23/98    ultrasound done     No past surgical history on file.  Current Outpatient  "Medications   Medication Sig Dispense Refill     albuterol (PROAIR HFA/PROVENTIL HFA/VENTOLIN HFA) 108 (90 Base) MCG/ACT inhaler Inhale 2 puffs into the lungs every 4 hours as needed for shortness of breath or wheezing 18 g 11     budesonide-formoterol (SYMBICORT) 160-4.5 MCG/ACT Inhaler Inhale 2 puffs into the lungs 2 times daily 6 g 11     neomycin-polymyxin-dexamethasone (MAXITROL) 3.5-82919-5.1 SUSP ophthalmic susp INSTILL ONE DROP TO LEFT EYE FOUR TIMES DAILY FOR ONE WEEK, THEN THREE TIMES DAILY FOR ONE WEEK, THEN TWICE DAILY FOR ONE WEEK.       OZEMPIC, 1 MG/DOSE, 4 MG/3ML SOPN INJECT 1MG SUBCUTANEOUSLY EVERY WEEK *rotate injection sites* 3 mL 6     Spacer/Aero-Holding Chambers (AEROCHAMBER WITH MOUTHPIECE) MISC USE AS DIRECTED WITH VENTOLIN INHALER       amoxicillin-clavulanate (AUGMENTIN) 875-125 MG tablet Take 1 tablet by mouth 2 times daily (Patient not taking: Reported on 4/6/2023) 56 tablet 0     methylPREDNISolone (MEDROL DOSEPAK) 4 MG tablet therapy pack Follow package directions. (Patient not taking: Reported on 4/6/2023) 21 tablet 0       Allergies   Allergen Reactions     No Known Drug Allergies         Social History     Tobacco Use     Smoking status: Never     Smokeless tobacco: Never   Vaping Use     Vaping status: Never Used   Substance Use Topics     Alcohol use: Not on file     No family history on file.  History   Drug Use Not on file         Objective     /66 (BP Location: Right arm, Patient Position: Sitting)   Pulse 87   Temp 98  F (36.7  C) (Tympanic)   Resp 16   Ht 1.549 m (5' 1\")   Wt 97.1 kg (214 lb)   LMP 03/22/2023 (Exact Date)   SpO2 97%   BMI 40.43 kg/m      Physical Exam    GENERAL APPEARANCE: healthy, alert and no distress     EYES: EOMI, PERRL     HENT: ear canals and TM's normal and nose and mouth without ulcers or lesions     NECK: no adenopathy, no asymmetry, masses, or scars and thyroid normal to palpation     RESP: lungs clear to auscultation - no rales, " rhonchi or wheezes     CV: regular rates and rhythm, normal S1 S2, no S3 or S4 and no murmur, click or rub     ABDOMEN:  soft, nontender, no HSM or masses and bowel sounds normal     MS: extremities normal- no gross deformities noted, no evidence of inflammation in joints, FROM in all extremities.     SKIN: no suspicious lesions or rashes     NEURO: Normal strength and tone, sensory exam grossly normal, mentation intact and speech normal     PSYCH: mentation appears normal. and affect normal/bright     LYMPHATICS: No cervical adenopathy    Recent Labs   Lab Test 03/28/23  1536 01/27/23  0842 12/02/22  1502 04/29/22  1043 09/30/21  0803   HGB  --  14.5  --  14.4 14.3   PLT  --   --   --  349 323   NA  --   --   --  141 138   POTASSIUM  --   --   --  4.1 4.3   CR  --   --   --  0.76 0.84   A1C 6.1*  --  6.5* 5.7* 7.3*        Diagnostics:  CBC Basic.   No EKG required for low risk surgery (cataract, skin procedure, breast biopsy, etc).    Revised Cardiac Risk Index (RCRI):  The patient has the following serious cardiovascular risks for perioperative complications:   - No serious cardiac risks = 0 points     RCRI Interpretation: 0 points: Class I (very low risk - 0.4% complication rate)           Signed Electronically by: Inocente Gutierrez MD  Copy of this evaluation report is provided to requesting physician.

## 2023-04-18 ENCOUNTER — ANESTHESIA EVENT (OUTPATIENT)
Dept: SURGERY | Facility: CLINIC | Age: 43
End: 2023-04-18
Payer: COMMERCIAL

## 2023-04-19 ENCOUNTER — ANESTHESIA (OUTPATIENT)
Dept: SURGERY | Facility: CLINIC | Age: 43
End: 2023-04-19
Payer: COMMERCIAL

## 2023-04-19 ENCOUNTER — HOSPITAL ENCOUNTER (OUTPATIENT)
Facility: CLINIC | Age: 43
Discharge: HOME | End: 2023-04-19
Attending: OTOLARYNGOLOGY | Admitting: OTOLARYNGOLOGY
Payer: COMMERCIAL

## 2023-04-19 VITALS
WEIGHT: 215.76 LBS | RESPIRATION RATE: 15 BRPM | SYSTOLIC BLOOD PRESSURE: 111 MMHG | OXYGEN SATURATION: 98 % | HEIGHT: 60 IN | BODY MASS INDEX: 42.36 KG/M2 | HEART RATE: 88 BPM | TEMPERATURE: 98.4 F | DIASTOLIC BLOOD PRESSURE: 67 MMHG

## 2023-04-19 DIAGNOSIS — J38.6 SUBGLOTTIC STENOSIS: Primary | ICD-10-CM

## 2023-04-19 LAB
GLUCOSE BLDC GLUCOMTR-MCNC: 140 MG/DL (ref 70–99)
GLUCOSE BLDC GLUCOMTR-MCNC: 180 MG/DL (ref 70–99)

## 2023-04-19 PROCEDURE — 360N000077 HC SURGERY LEVEL 4, PER MIN: Performed by: OTOLARYNGOLOGY

## 2023-04-19 PROCEDURE — 250N000011 HC RX IP 250 OP 636: Performed by: ANESTHESIOLOGY

## 2023-04-19 PROCEDURE — 370N000017 HC ANESTHESIA TECHNICAL FEE, PER MIN: Performed by: OTOLARYNGOLOGY

## 2023-04-19 PROCEDURE — 31641 BRONCHOSCOPY TREAT BLOCKAGE: CPT | Mod: GC | Performed by: OTOLARYNGOLOGY

## 2023-04-19 PROCEDURE — 82962 GLUCOSE BLOOD TEST: CPT | Mod: 91

## 2023-04-19 PROCEDURE — 250N000011 HC RX IP 250 OP 636: Performed by: OTOLARYNGOLOGY

## 2023-04-19 PROCEDURE — 272N000001 HC OR GENERAL SUPPLY STERILE: Performed by: OTOLARYNGOLOGY

## 2023-04-19 PROCEDURE — 250N000009 HC RX 250: Performed by: OTOLARYNGOLOGY

## 2023-04-19 PROCEDURE — 250N000013 HC RX MED GY IP 250 OP 250 PS 637: Performed by: STUDENT IN AN ORGANIZED HEALTH CARE EDUCATION/TRAINING PROGRAM

## 2023-04-19 PROCEDURE — 31625 BRONCHOSCOPY W/BIOPSY(S): CPT | Mod: 51 | Performed by: OTOLARYNGOLOGY

## 2023-04-19 PROCEDURE — 88305 TISSUE EXAM BY PATHOLOGIST: CPT | Mod: 26 | Performed by: STUDENT IN AN ORGANIZED HEALTH CARE EDUCATION/TRAINING PROGRAM

## 2023-04-19 PROCEDURE — 258N000003 HC RX IP 258 OP 636

## 2023-04-19 PROCEDURE — 250N000011 HC RX IP 250 OP 636

## 2023-04-19 PROCEDURE — 710N000009 HC RECOVERY PHASE 1, LEVEL 1, PER MIN: Performed by: OTOLARYNGOLOGY

## 2023-04-19 PROCEDURE — 710N000012 HC RECOVERY PHASE 2, PER MINUTE: Performed by: OTOLARYNGOLOGY

## 2023-04-19 PROCEDURE — 31573 LARGSC W/THER INJECTION: CPT | Mod: 51 | Performed by: OTOLARYNGOLOGY

## 2023-04-19 PROCEDURE — 250N000009 HC RX 250

## 2023-04-19 PROCEDURE — C1726 CATH, BAL DIL, NON-VASCULAR: HCPCS | Performed by: OTOLARYNGOLOGY

## 2023-04-19 PROCEDURE — 88305 TISSUE EXAM BY PATHOLOGIST: CPT | Mod: TC | Performed by: OTOLARYNGOLOGY

## 2023-04-19 PROCEDURE — 31630 BRONCHOSCOPY DILATE/FX REPR: CPT | Mod: 51 | Performed by: OTOLARYNGOLOGY

## 2023-04-19 PROCEDURE — 999N000141 HC STATISTIC PRE-PROCEDURE NURSING ASSESSMENT: Performed by: OTOLARYNGOLOGY

## 2023-04-19 RX ORDER — OXYCODONE HYDROCHLORIDE 5 MG/1
5 TABLET ORAL
Status: COMPLETED | OUTPATIENT
Start: 2023-04-19 | End: 2023-04-19

## 2023-04-19 RX ORDER — AMOXICILLIN 250 MG
1-2 CAPSULE ORAL 2 TIMES DAILY
Qty: 30 TABLET | Refills: 0 | Status: SHIPPED | OUTPATIENT
Start: 2023-04-19 | End: 2023-11-16

## 2023-04-19 RX ORDER — ONDANSETRON 4 MG/1
4 TABLET, ORALLY DISINTEGRATING ORAL
Status: DISCONTINUED | OUTPATIENT
Start: 2023-04-19 | End: 2023-04-19 | Stop reason: HOSPADM

## 2023-04-19 RX ORDER — ONDANSETRON 4 MG/1
4 TABLET, ORALLY DISINTEGRATING ORAL EVERY 8 HOURS PRN
Qty: 4 TABLET | Refills: 0 | Status: SHIPPED | OUTPATIENT
Start: 2023-04-19 | End: 2023-11-16

## 2023-04-19 RX ORDER — FENTANYL CITRATE 50 UG/ML
50 INJECTION, SOLUTION INTRAMUSCULAR; INTRAVENOUS EVERY 5 MIN PRN
Status: DISCONTINUED | OUTPATIENT
Start: 2023-04-19 | End: 2023-04-19 | Stop reason: HOSPADM

## 2023-04-19 RX ORDER — OXYMETAZOLINE HYDROCHLORIDE 0.05 G/100ML
SPRAY NASAL PRN
Status: DISCONTINUED | OUTPATIENT
Start: 2023-04-19 | End: 2023-04-19 | Stop reason: HOSPADM

## 2023-04-19 RX ORDER — SODIUM CHLORIDE, SODIUM LACTATE, POTASSIUM CHLORIDE, CALCIUM CHLORIDE 600; 310; 30; 20 MG/100ML; MG/100ML; MG/100ML; MG/100ML
INJECTION, SOLUTION INTRAVENOUS CONTINUOUS PRN
Status: DISCONTINUED | OUTPATIENT
Start: 2023-04-19 | End: 2023-04-19

## 2023-04-19 RX ORDER — PROPOFOL 10 MG/ML
INJECTION, EMULSION INTRAVENOUS CONTINUOUS PRN
Status: DISCONTINUED | OUTPATIENT
Start: 2023-04-19 | End: 2023-04-19

## 2023-04-19 RX ORDER — PROPOFOL 10 MG/ML
INJECTION, EMULSION INTRAVENOUS PRN
Status: DISCONTINUED | OUTPATIENT
Start: 2023-04-19 | End: 2023-04-19

## 2023-04-19 RX ORDER — ACETAMINOPHEN 325 MG/1
650 TABLET ORAL
Status: DISCONTINUED | OUTPATIENT
Start: 2023-04-19 | End: 2023-04-19 | Stop reason: HOSPADM

## 2023-04-19 RX ORDER — LIDOCAINE HYDROCHLORIDE 20 MG/ML
INJECTION, SOLUTION INFILTRATION; PERINEURAL PRN
Status: DISCONTINUED | OUTPATIENT
Start: 2023-04-19 | End: 2023-04-19

## 2023-04-19 RX ORDER — ONDANSETRON 2 MG/ML
INJECTION INTRAMUSCULAR; INTRAVENOUS PRN
Status: DISCONTINUED | OUTPATIENT
Start: 2023-04-19 | End: 2023-04-19

## 2023-04-19 RX ORDER — TRIAMCINOLONE ACETONIDE 40 MG/ML
INJECTION, SUSPENSION INTRA-ARTICULAR; INTRAMUSCULAR PRN
Status: DISCONTINUED | OUTPATIENT
Start: 2023-04-19 | End: 2023-04-19 | Stop reason: HOSPADM

## 2023-04-19 RX ORDER — FENTANYL CITRATE 50 UG/ML
25 INJECTION, SOLUTION INTRAMUSCULAR; INTRAVENOUS EVERY 5 MIN PRN
Status: DISCONTINUED | OUTPATIENT
Start: 2023-04-19 | End: 2023-04-19 | Stop reason: HOSPADM

## 2023-04-19 RX ORDER — ACETAMINOPHEN 325 MG/1
650 TABLET ORAL EVERY 4 HOURS PRN
Qty: 50 TABLET | Refills: 0 | Status: SHIPPED | OUTPATIENT
Start: 2023-04-19 | End: 2023-11-16

## 2023-04-19 RX ORDER — SODIUM CHLORIDE, SODIUM LACTATE, POTASSIUM CHLORIDE, CALCIUM CHLORIDE 600; 310; 30; 20 MG/100ML; MG/100ML; MG/100ML; MG/100ML
INJECTION, SOLUTION INTRAVENOUS CONTINUOUS
Status: DISCONTINUED | OUTPATIENT
Start: 2023-04-19 | End: 2023-04-19 | Stop reason: HOSPADM

## 2023-04-19 RX ORDER — DEXAMETHASONE SODIUM PHOSPHATE 4 MG/ML
INJECTION, SOLUTION INTRA-ARTICULAR; INTRALESIONAL; INTRAMUSCULAR; INTRAVENOUS; SOFT TISSUE PRN
Status: DISCONTINUED | OUTPATIENT
Start: 2023-04-19 | End: 2023-04-19

## 2023-04-19 RX ORDER — ONDANSETRON 2 MG/ML
4 INJECTION INTRAMUSCULAR; INTRAVENOUS EVERY 30 MIN PRN
Status: DISCONTINUED | OUTPATIENT
Start: 2023-04-19 | End: 2023-04-19 | Stop reason: HOSPADM

## 2023-04-19 RX ORDER — LIDOCAINE HYDROCHLORIDE 40 MG/ML
SOLUTION TOPICAL PRN
Status: DISCONTINUED | OUTPATIENT
Start: 2023-04-19 | End: 2023-04-19 | Stop reason: HOSPADM

## 2023-04-19 RX ORDER — ONDANSETRON 4 MG/1
4 TABLET, ORALLY DISINTEGRATING ORAL EVERY 30 MIN PRN
Status: DISCONTINUED | OUTPATIENT
Start: 2023-04-19 | End: 2023-04-19 | Stop reason: HOSPADM

## 2023-04-19 RX ORDER — AZITHROMYCIN 250 MG/1
TABLET, FILM COATED ORAL
Qty: 6 TABLET | Refills: 0 | Status: SHIPPED | OUTPATIENT
Start: 2023-04-19 | End: 2023-04-24

## 2023-04-19 RX ORDER — HYDROMORPHONE HCL IN WATER/PF 6 MG/30 ML
0.2 PATIENT CONTROLLED ANALGESIA SYRINGE INTRAVENOUS EVERY 5 MIN PRN
Status: DISCONTINUED | OUTPATIENT
Start: 2023-04-19 | End: 2023-04-19 | Stop reason: HOSPADM

## 2023-04-19 RX ORDER — HYDROMORPHONE HCL IN WATER/PF 6 MG/30 ML
0.4 PATIENT CONTROLLED ANALGESIA SYRINGE INTRAVENOUS EVERY 5 MIN PRN
Status: DISCONTINUED | OUTPATIENT
Start: 2023-04-19 | End: 2023-04-19 | Stop reason: HOSPADM

## 2023-04-19 RX ORDER — FENTANYL CITRATE 50 UG/ML
INJECTION, SOLUTION INTRAMUSCULAR; INTRAVENOUS PRN
Status: DISCONTINUED | OUTPATIENT
Start: 2023-04-19 | End: 2023-04-19

## 2023-04-19 RX ADMIN — DEXAMETHASONE SODIUM PHOSPHATE 10 MG: 4 INJECTION, SOLUTION INTRA-ARTICULAR; INTRALESIONAL; INTRAMUSCULAR; INTRAVENOUS; SOFT TISSUE at 09:57

## 2023-04-19 RX ADMIN — SODIUM CHLORIDE, POTASSIUM CHLORIDE, SODIUM LACTATE AND CALCIUM CHLORIDE: 600; 310; 30; 20 INJECTION, SOLUTION INTRAVENOUS at 09:46

## 2023-04-19 RX ADMIN — ACETAMINOPHEN 650 MG: 325 SOLUTION ORAL at 11:43

## 2023-04-19 RX ADMIN — FENTANYL CITRATE 25 MCG: 50 INJECTION, SOLUTION INTRAMUSCULAR; INTRAVENOUS at 11:37

## 2023-04-19 RX ADMIN — PROPOFOL 50 MG: 10 INJECTION, EMULSION INTRAVENOUS at 10:13

## 2023-04-19 RX ADMIN — LIDOCAINE HYDROCHLORIDE 100 MG: 20 INJECTION, SOLUTION INFILTRATION; PERINEURAL at 09:57

## 2023-04-19 RX ADMIN — FENTANYL CITRATE 25 MCG: 50 INJECTION, SOLUTION INTRAMUSCULAR; INTRAVENOUS at 11:47

## 2023-04-19 RX ADMIN — OXYCODONE HYDROCHLORIDE 5 MG: 5 TABLET ORAL at 11:44

## 2023-04-19 RX ADMIN — FENTANYL CITRATE 50 MCG: 50 INJECTION, SOLUTION INTRAMUSCULAR; INTRAVENOUS at 10:30

## 2023-04-19 RX ADMIN — PROPOFOL 30 MG: 10 INJECTION, EMULSION INTRAVENOUS at 10:30

## 2023-04-19 RX ADMIN — ONDANSETRON 4 MG: 2 INJECTION INTRAMUSCULAR; INTRAVENOUS at 10:52

## 2023-04-19 RX ADMIN — PROPOFOL 150 MG: 10 INJECTION, EMULSION INTRAVENOUS at 09:56

## 2023-04-19 RX ADMIN — MIDAZOLAM 2 MG: 1 INJECTION INTRAMUSCULAR; INTRAVENOUS at 09:42

## 2023-04-19 RX ADMIN — FENTANYL CITRATE 100 MCG: 50 INJECTION, SOLUTION INTRAMUSCULAR; INTRAVENOUS at 09:57

## 2023-04-19 RX ADMIN — FENTANYL CITRATE 50 MCG: 50 INJECTION, SOLUTION INTRAMUSCULAR; INTRAVENOUS at 11:26

## 2023-04-19 RX ADMIN — PROPOFOL 40 MG: 10 INJECTION, EMULSION INTRAVENOUS at 10:37

## 2023-04-19 RX ADMIN — FENTANYL CITRATE 50 MCG: 50 INJECTION, SOLUTION INTRAMUSCULAR; INTRAVENOUS at 10:13

## 2023-04-19 RX ADMIN — PROPOFOL 125 MCG/KG/MIN: 10 INJECTION, EMULSION INTRAVENOUS at 09:57

## 2023-04-19 RX ADMIN — PROPOFOL 30 MG: 10 INJECTION, EMULSION INTRAVENOUS at 10:43

## 2023-04-19 ASSESSMENT — ACTIVITIES OF DAILY LIVING (ADL)
ADLS_ACUITY_SCORE: 22
ADLS_ACUITY_SCORE: 33

## 2023-04-19 NOTE — ANESTHESIA CARE TRANSFER NOTE
Patient: Anuradha Brown    Procedure: Procedure(s):  carbon dioxide laser resection of stenosis, CRE balloon dilation  Bronchoscopy flexible and biopsy  steroid injection       Diagnosis: Subglottic stenosis [J38.6]  Diagnosis Additional Information: No value filed.    Anesthesia Type:   General     Note:    Oropharynx: oropharynx clear of all foreign objects and spontaneously breathing  Level of Consciousness: drowsy  Oxygen Supplementation: face mask  Level of Supplemental Oxygen (L/min / FiO2): 6  Independent Airway: airway patency satisfactory and stable  Dentition: dentition unchanged  Vital Signs Stable: post-procedure vital signs reviewed and stable  Report to RN Given: handoff report given  Patient transferred to: PACU    Handoff Report: Identifed the Patient, Identified the Reponsible Provider, Reviewed the pertinent medical history, Discussed the surgical course, Reviewed Intra-OP anesthesia mangement and issues during anesthesia, Set expectations for post-procedure period and Allowed opportunity for questions and acknowledgement of understanding      Vitals:  Vitals Value Taken Time   /63    Temp     Pulse 89 04/19/23 1117   Resp 17 04/19/23 1117   SpO2 99 % 04/19/23 1117   Vitals shown include unvalidated device data.    Electronically Signed By: KEN Fernando CRNA  April 19, 2023  11:18 AM

## 2023-04-19 NOTE — ANESTHESIA PREPROCEDURE EVALUATION
Anesthesia Pre-Procedure Evaluation    Patient: Anuradha Brown   MRN: [id number] : 1980        Procedure : Procedure(s):  MICROLARYNGOSCOPY, carbon dioxide laser resection of stenosis, CRE balloon dilation  Bronchoscopy flexible  steroid injection, possible mitomycin application          Past Medical History:   Diagnosis Date     Other and unspecified ovarian cyst 98    ultrasound done      History reviewed. No pertinent surgical history.   Allergies   Allergen Reactions     No Known Drug Allergies       Social History     Tobacco Use     Smoking status: Never     Smokeless tobacco: Never   Vaping Use     Vaping status: Never Used   Substance Use Topics     Alcohol use: Yes     Comment: Rarely      Wt Readings from Last 1 Encounters:   23 97.9 kg (215 lb 12.2 oz)        Anesthesia Evaluation   Pt has not had prior anesthetic         ROS/MED HX  ENT/Pulmonary:     (+) asthma Treatment: Inhaled steroids,      Neurologic:  - neg neurologic ROS     Cardiovascular:       METS/Exercise Tolerance: >4 METS    Hematologic:  - neg hematologic  ROS     Musculoskeletal:  - neg musculoskeletal ROS     GI/Hepatic:  - neg GI/hepatic ROS     Renal/Genitourinary:  - neg Renal ROS     Endo:     (+) type I DM, Obesity,     Psychiatric/Substance Use:  - neg psychiatric ROS     Infectious Disease:       Malignancy:  - neg malignancy ROS     Other:            Physical Exam    Airway        Mallampati: II   TM distance: > 3 FB   Neck ROM: full   Mouth opening: > 3 cm    Respiratory Devices and Support         Dental       (+) Minor Abnormalities - some fillings, tiny chips      Cardiovascular   cardiovascular exam normal          Pulmonary   pulmonary exam normal                OUTSIDE LABS:  CBC:   Lab Results   Component Value Date    WBC 6.0 2023    WBC 5.5 2022    HGB 14.3 2023    HGB 14.5 2023    HCT 43.1 2023    HCT 43.6 2022     2023     2022      BMP:   Lab Results   Component Value Date     04/06/2023     04/29/2022    POTASSIUM 4.3 04/06/2023    POTASSIUM 4.1 04/29/2022    CHLORIDE 105 04/06/2023    CHLORIDE 110 (H) 04/29/2022    CO2 25 04/06/2023    CO2 26 04/29/2022    BUN 15.9 04/06/2023    BUN 12 04/29/2022    CR 0.99 (H) 04/06/2023    CR 0.76 04/29/2022     (H) 04/19/2023     (H) 04/06/2023     COAGS: No results found for: PTT, INR, FIBR  POC: No results found for: BGM, HCG, HCGS  HEPATIC: No results found for: ALBUMIN, PROTTOTAL, ALT, AST, GGT, ALKPHOS, BILITOTAL, BILIDIRECT, SONAL  OTHER:   Lab Results   Component Value Date    A1C 6.1 (H) 03/28/2023    JENA 9.7 04/06/2023    TSH 1.06 08/13/2021    SED 13 03/28/2023       Anesthesia Plan    ASA Status:  2      Anesthesia Type: General.   Induction: Intravenous.   Maintenance: Balanced.        Consents    Anesthesia Plan(s) and associated risks, benefits, and realistic alternatives discussed. Questions answered and patient/representative(s) expressed understanding.    - Discussed:     - Discussed with:  Patient      - Extended Intubation/Ventilatory Support Discussed: No.      - Patient is DNR/DNI Status: No    Use of blood products discussed: No .     Postoperative Care    Pain management: Multi-modal analgesia.   PONV prophylaxis: Ondansetron (or other 5HT-3)     Comments:              PAC Discussion and Assessment    ASA Classification: 2    Anesthetic techniques and relevant risks discussed: GA  Invasive monitoring and risk discussed: No    Possibility and Risk of blood transfusion discussed: No  NPO instructions given: No solids 6 hours before surgery, stop clear liquids 2 hours before surgery    Needs early admission to pre-op area: No                                             Thais Zhang MD

## 2023-04-19 NOTE — OP NOTE
Operative Note   Otolaryngology - Head and Neck Surgery       DATE OF OPERATION:   April 19, 2023    PREOPERATIVE DIAGNOSIS:   Subglottic stenosis      POSTOPERATIVE DIAGNOSIS:   Same    NAME OF OPERATION:   1. Flexible bronchoscopy with CO2 laser of subglottic stenosis.   2. Flexible bronchoscopy with balloon dilation of subglottic stenosis to 15 mmHg   3. Flexible bronchoscopy with injection of kenalog steroid to subglottic larynx.  4. Flexible bronchoscopy with biopsy    ANESTHESIA  Type: General   LMA    SURGEON:   Haylie Polo MD    RESIDENT SURGEON(S):   Ines Weiss MD    INDICATIONS FOR PROCEDURE:   The patient is a 42 year old female with a history of subglottic and tracheal stenosis. she is being brought to the Operating Room for initial treatment of the stenosis. Risks, benefits, alternatives, and rationale for the procedure(s) listed above were discussed with the patient, and the patient wishes to proceed with surgery and has signed an informed consent.     FINDINGS:   1. Entire procedure done via LMA with spontaneous ventilation  2. Subglottic stenosis: 0.5cm distal to the vocal folds, 2.0cm thick, grade 3, 75 % narrowing - thick posterior right lateral scar band - required 3 co2 laser passes to remove, can consider DL approach with scissor next time  3. CRE balloon to 15 mmHg     Post-op plan: zpack, ppi, steroid inhaler     DESCRIPTION OF PROCEDURE:   The patient was brought into the operating room and placed supine on the operating room table. A time-out was performed. General anesthesia was induced. The patient was an easy mask ventilated. Then the patient was ventilated with a LMA.     The head of the bed was turned 90 degrees to the right. A flexible bronchoscope was placed through the LMA. 4% LTA was infused over the glottic entry. Once the topical anesthesia had been placed, the flexible bronchoscope was placed through the vocal cords. The patient had evidence of approximately grade 3 subglottic  and tracheal narrowing. There was a scar bridge right laeral      The procedure began with first performing CO2 laser. The flexible waveguide CO2 laser was threaded through the bronchoscope. A laser time-out was performed. The patient's face and eyes were protected with moist towels. The oxygen was subsequently reduced to less than 30%. Radial cuts were made within the scar band starting anteriorly through the scar bridge and then more cuts were made laterally and posteriorly.     The patient was then allowed to obtain adequate saturation and the balloon dilator was then placed through the flexible bronchoscope. Dilations were performed to 15 mmHg. Once the subglottic area had been adequately dilated, the patient was once again allowed to obtain 100% saturation.       Biopsy of the subglottis was also performed and the specimen was sent for permanent histopathologic evaluation.     Then 2.0 ml of kenalog 40 steroid  was injected into the scar via a flexible injector circumferentially on the right and left.     Hemostasis was confirmed. This was the end of our procedure. The instrumentation was carefully. The patient was then handed back to the care of anesthesia who awoke the patient without complications.    COMPLICATIONS:   None.  .   ESTIMATED BLOOD LOSS:   Less than 10cc    DISPOSITION:   PACU.      SPECIMENS:  ID Type Source Tests Collected by Time Destination   1 : subglottic biopsy Tissue Other SURGICAL PATHOLOGY EXAM Haylie Polo MD 4/19/2023 10:50 AM           PHOTODOCUMENTATION:

## 2023-04-19 NOTE — DISCHARGE INSTRUCTIONS
[facility]  Same-Day Surgery   Adult Discharge Orders & Instructions     For 24 hours after surgery    Get plenty of rest.  A responsible adult must stay with you for at least 24 hours after you leave the hospital.   Do not drive or use heavy equipment.  If you have weakness or tingling, don't drive or use heavy equipment until this feeling goes away.  Do not drink alcohol.  Avoid strenuous or risky activities.  Ask for help when climbing stairs.   You may feel lightheaded.  IF so, sit for a few minutes before standing.  Have someone help you get up.   If you have nausea (feel sick to your stomach): Drink only clear liquids such as apple juice, ginger ale, broth or 7-Up.  Rest may also help.  Be sure to drink enough fluids.  Move to a regular diet as you feel able.  You may have a slight fever. Call the doctor if your fever is over 100 F (37.7 C) (taken under the tongue) or lasts longer than 24 hours.  You may have a dry mouth, a sore throat, muscle aches or trouble sleeping.  These should go away after 24 hours.  Do not make important or legal decisions.   Call your doctor for any of the followin.  Signs of infection (fever, growing tenderness at the surgery site, a large amount of drainage or bleeding, severe pain, foul-smelling drainage, redness, swelling).    2. It has been over 8 to 10 hours since surgery and you are still not able to urinate (pass water).    3.  Headache for over 24 hours.      To contact a doctor, call Dr. Polo at Otolaryngology/ENT clinic @ [phone] (Monday to Friday 8-4:30) or:  [phone] and ask for the resident on call for   Otolaryngology (answered 24 hours a day)  '   Emergency Department:    [facility]: [phone]       (TTY for hearing impaired: [phone])    [facility]: [phone]       (TTY for hearing impaired: [phone])

## 2023-04-20 ENCOUNTER — TELEPHONE (OUTPATIENT)
Dept: FAMILY MEDICINE | Facility: CLINIC | Age: 43
End: 2023-04-20
Payer: COMMERCIAL

## 2023-04-20 LAB
PATH REPORT.COMMENTS IMP SPEC: NORMAL
PATH REPORT.COMMENTS IMP SPEC: NORMAL
PATH REPORT.FINAL DX SPEC: NORMAL
PATH REPORT.GROSS SPEC: NORMAL
PATH REPORT.MICROSCOPIC SPEC OTHER STN: NORMAL
PATH REPORT.RELEVANT HX SPEC: NORMAL
PHOTO IMAGE: NORMAL

## 2023-04-20 NOTE — TELEPHONE ENCOUNTER
Patient Returning Call    Reason for call:  PT had surgery yesterday and was instructed to reach out to her PCP before she resumes taking her medications.  She would need clinical staff to give her a call and clarify which, if any, of her medications are appropriate for her to take post-surgery.     Information relayed to patient:  That this information would be relayed to Dr. Gutierrez's care team and that someone would reach out to her.    Patient has additional questions:  No    Could we send this information to you in [facility] or would you prefer to receive a phone call?:   Phone call is preferred.    Okay to leave a detailed message?: Yes at Cell number on file:    Telephone Information:   Mobile [phone]

## 2023-04-24 ENCOUNTER — PATIENT OUTREACH (OUTPATIENT)
Dept: OTOLARYNGOLOGY | Facility: CLINIC | Age: 43
End: 2023-04-24
Payer: COMMERCIAL

## 2023-04-24 NOTE — PROGRESS NOTES
Called patient to check in on her after her procedure. Patient said she is going well and not in pain. Patient reported that she has had hiccups occasionally after the procedure, but only one or two at a time and they are not painful nor do they hinder her airway. Patient will follow up with her PCP on if she needs to continue the inhaler that they'd prescribed to her. Confirmed post-op date with patient, she was agreeable and verbalized understanding of the situation. Suzanne Ochoa RN on 4/24/2023 at 9:31 AM

## 2023-05-16 NOTE — PROGRESS NOTES
[facility]   at the [facility]   Otolaryngology Clinic     Patient: Anuradha Brown    MRN: [id number]    : 1980    Age/Gender: 42 year old female  Date of Service: 2023  Rendering Provider:   Haylie Polo MD     Chief Complaint   Subglottic stenosis  Dyspnea  S/p CO2 laser, CRE balloon dilation, kenalog injection 23  Interval History   HISTORY OF PRESENT ILLNESS: Ms. Brown is a 42 year old female is being followed for dyspnea. She was initially seen on 3/28/23. Please refer to this note for full history.     Today, she presents for follow up. She reports:  - doing well  - best peak flow of 350  - on average though, 300  - breathing feels good  - hoarseness with prolonged talking  - coughs to mucus out but doesn't work  - throat clears too  - peak flow today 350  - denies heartburn     PAST MEDICAL HISTORY:   Past Medical History:   Diagnosis Date     Other and unspecified ovarian cyst 98    ultrasound done       PAST SURGICAL HISTORY:   Past Surgical History:   Procedure Laterality Date     BRONCHOSCOPY FLEXIBLE N/A 2023    Procedure: Bronchoscopy flexible and biopsy;  Surgeon: Haylie Polo MD;  Location: [facility] OR     INJECT STEROID (LOCATION) N/A 2023    Procedure: steroid injection;  Surgeon: Haylie Polo MD;  Location: [facility] OR     LASER CO2 LARYNGOSCOPY N/A 2023    Procedure: carbon dioxide laser resection of stenosis, CRE balloon dilation;  Surgeon: Haylie Polo MD;  Location: [facility] OR       CURRENT MEDICATIONS:   Current Outpatient Medications:      acetaminophen (TYLENOL) 325 MG tablet, Take 2 tablets (650 mg) by mouth every 4 hours as needed for mild pain, Disp: 50 tablet, Rfl: 0     budesonide-formoterol (SYMBICORT) 160-4.5 MCG/ACT Inhaler, Inhale 2 puffs into the lungs 2 times daily, Disp: 6 g, Rfl: 11     neomycin-polymyxin-dexamethasone (MAXITROL) 3.5-09859-2.1 SUSP ophthalmic susp, INSTILL ONE DROP TO LEFT EYE FOUR TIMES DAILY FOR ONE WEEK,  THEN THREE TIMES DAILY FOR ONE WEEK, THEN TWICE DAILY FOR ONE WEEK., Disp: , Rfl:      omeprazole (PRILOSEC) 20 MG DR capsule, Take 1 capsule (20 mg) by mouth daily for 120 days, Disp: 30 capsule, Rfl: 3     ondansetron (ZOFRAN ODT) 4 MG ODT tab, Take 1 tablet (4 mg) by mouth every 8 hours as needed for nausea, Disp: 4 tablet, Rfl: 0     OZEMPIC, 1 MG/DOSE, 4 MG/3ML SOPN, INJECT 1MG SUBCUTANEOUSLY EVERY WEEK *rotate injection sites*, Disp: 3 mL, Rfl: 6     senna-docusate (SENOKOT-S/PERICOLACE) 8.6-50 MG tablet, Take 1-2 tablets by mouth 2 times daily, Disp: 30 tablet, Rfl: 0     Spacer/Aero-Holding Chambers (AEROCHAMBER WITH MOUTHPIECE) MISC, USE AS DIRECTED WITH VENTOLIN INHALER, Disp: , Rfl:     ALLERGIES: No known drug allergy    SOCIAL HISTORY:    Social History     Socioeconomic History     Marital status:      Spouse name: Not on file     Number of children: Not on file     Years of education: Not on file     Highest education level: Not on file   Occupational History     Not on file   Tobacco Use     Smoking status: Never     Smokeless tobacco: Never   Vaping Use     Vaping status: Never Used   Substance and Sexual Activity     Alcohol use: Yes     Comment: Rarely     Drug use: Never     Sexual activity: Not on file   Other Topics Concern     Not on file   Social History Narrative     Not on file     Social Determinants of Health     Financial Resource Strain: Not on file   Food Insecurity: Not on file   Transportation Needs: Not on file   Physical Activity: Not on file   Stress: Not on file   Social Connections: Not on file   Intimate Partner Violence: Not on file   Housing Stability: Not on file         FAMILY HISTORY: No family history on file.   Non-contributory for problems with anesthesia    REVIEW OF SYSTEMS:   The patient was asked a 14 point review of systems regarding constitutional symptoms, eye symptoms, ears, nose, mouth, throat symptoms, cardiovascular symptoms, respiratory symptoms,  gastrointestinal symptoms, genitourinary symptoms, musculoskeletal symptoms, integumentary symptoms, neurological symptoms, psychiatric symptoms, endocrine symptoms, hematologic/lymphatic symptoms, and allergic/ immunologic symptoms.   The pertinent factors have been included in the HPI and below.  Patient Supplied Answers to Review of Systems      3/28/2023     1:49 PM    ENT ROS   Psychology Frequently feeling anxious   Cardiopulmonary Cough    Breathing problems    Wheezing       Physical Examination   The patient underwent a physical examination as described below. The pertinent positive and negative findings are summarized after the description of the examination.  Constitutional: The patient's developmental and nutritional status was assessed. The patient's voice quality was assessed.  Head and Face: The head and face were inspected for deformities. The sinuses were palpated. The salivary glands were palpated. Facial muscle strength was assessed bilaterally.  Eyes: Extraocular movements and primary gaze alignment were assessed.  Ears, Nose, Mouth and Throat: The ears and nose were examined for deformities. The nasal septum, mucosa, and turbinates were inspected by anterior rhinoscopy. The lips, teeth, and gums were examined for abnormalities. The oral mucosa, tongue, palate, tonsils, lateral and posterior pharynx were inspected for the presence of asymmetry or mucosal lesions.    Neck: The tracheal position was noted, and the neck mass palpated to determine if there were any asymmetries, abnormal neck masses, thyromegally, or thyroid nodules.  Respiratory: The nature of the breathing and chest expansion/symmetry was observed.  Cardiovascular: The patient was examined to determine the presence of any edema or jugular venous distension.  Abdomen: The contour of the abdomen was noted.  Lymphatic: The patient was examined for infraclavicular lymphadenopathy.  Musculoskeletal: The patient was inspected for the  presence of skeletal deformities.  Extremities: The extremities were examined for any clubbing or cyanosis.  Skin: The skin was examined for inflammatory or neoplastic conditions.  Neurologic: The patient's orientation, mood, and affect were noted. The cranial nerve  functions were examined.  Other pertinent positive and negative findings on physical examination:   OC/OP: no lesions, uvula midline, soft palate elevates symmetrically   Neck: no lesions, no TH tenderness to palpation  All other physical examination findings were within normal limits and noncontributory.    Procedures   Flexible laryngoscopy (CPT 75791)        Pre-procedure diagnosis: subglottic stenosis  Post-procedure diagnosis: same as above  Indication for procedure: Ms. Brown is a 42 year old female with see above  Procedure(s): Fiberoptic Laryngoscopy     Details of Procedure: After informed consent was obtained, the patient was seated in the examination chair.  The areas of the nasopharynx as well as the hypopharynx were anesthetized with topical 4% lidocaine with 0.25% phenylephrine atomizer.  Examination of the base of tongue was performed first.  Attention was directed to any evidence of masses in the area or evidence of leukoplakia or candidal infection.  Attention was directed to the epiglottis where its size and position was determined and its movement on phonation of the vowel  e .  The piriform sinuses were then inspected for any mass lesions or pooling of secretions.  Attention was then directed to the larynx. The vocal folds were inspected for infection or any areas of leukoplakia, for masses, polypoid degeneration, or hemorrhage.  Having done this, the arytenoids and vocal processes were inspected for erythema or evidence of granuloma formation.  The posterior commissure was then inspected for evidence of inflammatory changes in the mucosa and heaping up of mucosal tissue. The patient was then instructed to say the vowel  e .   Adduction of vocal folds to the midline was observed for any evidence of paresis or paralysis of the larynx or asymmetry in rotation of the larynx to the left or right. The patient was asked to breathe and the degree of abduction was noted bilaterally.  Subglottic view of the larynx was obtained for any additional mass lesions or mucosal changes.  Finally the post cricoid was examined for evidence of pooling of secretions, as well as the pharyngeal wall mucosa.   Anesthesia type: 0.25% phenylephrine     Findings:  Anatomic/physiological deviations: RNC, grade 1 5% narrowing, mucus in subglottic space               Right vocal process: No restriction of mobility   Left vocal process: No restriction of mobility  Glottal gap: Complete glottal closure  Supraglottic structures: Normal  Hypopharynx: Normal      Estimated Blood Loss: minimal  Complications: None  Disposition: Patient tolerated the procedure well          Review of Relevant Clinical Data   I personally reviewed:  Pathology:   Surgical Path (4/19/23)  Final Diagnosis  A. LARYNX, SUBGLOTTIS, BIOPSY:  - Fragment of fibrous tissue with chronic inflammation  - No evidence of malignancy    Labs:  Lab Results   Component Value Date    TSH 1.06 08/13/2021     Lab Results   Component Value Date     04/06/2023    CO2 25 04/06/2023    BUN 15.9 04/06/2023     Lab Results   Component Value Date    WBC 6.0 04/06/2023    HGB 14.3 04/06/2023    HCT 43.1 04/06/2023    MCV 89 04/06/2023     04/06/2023     No results found for: PT, PTT, INR  No results found for: DRAKE  No components found for: RHEUMATOIDFACTOR,  RF  No results found for: CRP  No components found for: CKTOT, URICACID  No components found for: C3, C4, DSDNAAB, NDNAABIFA  No results found for: MPOAB    Patient reported Quality of Life (QOL) Measures   Patient Supplied Answers To VHI Questionnaire      3/28/2023     2:01 PM   Voice Handicap Index (VHI-10)   My voice makes it difficult for people to  "hear me 2   People have difficulty understanding me in a noisy room 2   My voice difficulties restrict my personal and social life.  0   I feel left out of conversations because of my voice 0   My voice problem causes me to lose income 0   I feel as though I have to strain to produce voice 2   The clarity of my voice is unpredictable 2   My voice problem upsets me 1   My voice makes me feel handicapped 1   People ask, \"What's wrong with your voice?\" 2   VHI-10 12         Patient Supplied Answers To EAT Questionnaire      3/28/2023     2:02 PM   Eating Assessment Tool (EAT-10)   My swallowing problem has caused me to lose weight 0   My swallowing problem interferes with my ability to go out for meals 0   Swallowing liquids takes extra effort 0   Swallowing solids takes extra effort 0   Swallowing pills takes extra effort 0   Swallowing is painful 0   The pleasure of eating is affected by my swallowing 0   When I swallow food sticks in my throat 0   I cough when I eat 0   Swallowing is stressful 0   EAT-10 0         Patient Supplied Answers To CSI Questionnaire      3/28/2023     2:03 PM   Cough Severity Index (CSI)   My cough is worse when I lie down 1   My coughing problem causes me to restrict my personal and social life 0   I tend to avoid places because of my cough problem 4   I feel embarrassed because of my coughing problem 3   People ask, ''What's wrong?'' because I cough a lot 3   I run out of air when I cough 2   My coughing problem affects my voice 2   My coughing problem limits my physical activity 3   My coughing problem upsets me 2   People ask me if I am sick because I cough a lot 2   CSI Score 22         Patient Supplied Answers to Dyspnea Index Questionnaire:      3/23/2023    10:54 AM   Dyspnea Index   1. I have trouble getting air in. 1   2. I feel tightness in my throat when I am having boni breathing problem. 2   3. It takes more effort to breathe than it used to. 3   4. Change in weather affect my " breathing problem. 3   5. My breathing gets worse with stress. 2   6. I make sound/noise breathing in 1   7. I have to strain to breathe. 2   8. My shortness of breath gets worse with exercise or physical activity 4   9. My breathing problem makes me feel stressed. 2   10. My breathing problem casuses me to restrict my personal and social life. 2   Dyspnea Index Total Score 22       Impression & Plan     IMPRESSION: Ms. Brown is a 42 year old female who is being seen for the followin. Dyspnea  - due to subglottic stenosis   - no intubation history   - has diabetes, on Ozempic, most recent A1C is 6.5 (22)  - denies history of autoimmune disease  - CT chest 23 normal  - PFTs 23 is abnormal, stridor heard on exam  - denies reflux   - BMI is 40  - patient symptoms with shortness of breath with exertion and prolonged talking, one severe episode woke her from sleep and resulted in ER visit, on steroid inhalers but doesn't help  - scope shows grade 3 80% stenosis  - discussed etiology of trauma (intubation), autoimmune, diabetes or idiopathic. In this case this is most likely idiopathic given no prior intubation, but also has component of diabetes, need to rule out autoimmune issues  - discussed treatment with observation, conservative management with oral abx/steroids/reflux precautions, steroid injections, endoscopic procedures, open resection and bypass procedure with tracheotomy  - patient elects to proceed with endoscopic surgery  - s/p CO2 laser, CRE balloon dilation, kenalog injection 23  - symptoms 2023 are improved breathing with average peak flow of 300 and max of 350, peak flow today is 350, has mucus sensation with need to cough and throat clear, hoarse voice with prolonged talking, walked up 3 flights of stairs yesterday  - scope shows grade 1 5% narrowing, mucus in subglottic space  - given mucus sensation, recommend nebulizer or Mucinex, has daughter's nebulizer  -  recommend voice therapy  - discussed observation with increased physical activity/weight management and close monitoring of diabetes versus conservative management with oral abx/steroids/reflux precautions versus steroid injections  - patient elects observation   Plan  - nebulizer with saline bullets  - stop reflux medications  - increased physical activity  - close monitoring of diabetes  - peak flow meter  - voice therapy  - warm intro for airway study    RETURN VISIT: in September    Haylie Polo MD    Laryngology    [facility]  Department of  Otolaryngology - Head and Neck Surgery  Clinics & Surgery Center  [address] [place] [address]  Appointment line: [phone]  Fax: [phone]  [email or web address]     IMaria Esther, am serving as a scribe to document services personally performed by Haylie Polo MD at this visit, based upon the provider's statements to me. All documentation has been reviewed by the aforementioned provider prior to being entered into the official medical record.

## 2023-05-18 ENCOUNTER — OFFICE VISIT (OUTPATIENT)
Dept: OTOLARYNGOLOGY | Facility: CLINIC | Age: 43
End: 2023-05-18
Payer: COMMERCIAL

## 2023-05-18 VITALS — BODY MASS INDEX: 40.59 KG/M2 | HEIGHT: 61 IN | WEIGHT: 215 LBS

## 2023-05-18 DIAGNOSIS — J38.6 SUBGLOTTIC STENOSIS: Primary | ICD-10-CM

## 2023-05-18 DIAGNOSIS — R49.0 DYSPHONIA: Primary | ICD-10-CM

## 2023-05-18 PROCEDURE — 31575 DIAGNOSTIC LARYNGOSCOPY: CPT | Performed by: OTOLARYNGOLOGY

## 2023-05-18 PROCEDURE — 99214 OFFICE O/P EST MOD 30 MIN: CPT | Mod: 25 | Performed by: OTOLARYNGOLOGY

## 2023-05-18 PROCEDURE — 99207 PR NO CHARGE LOS: CPT

## 2023-05-18 ASSESSMENT — PAIN SCALES - GENERAL: PAINLEVEL: NO PAIN (0)

## 2023-05-18 NOTE — LETTER
2023       RE: Anuradha Brown  [address] [facility] [address]     Dear Colleague,    Thank you for referring your patient, Anuradha Brown, to the [facility] EAR NOSE AND THROAT CLINIC [place] at [facility]. Please see a copy of my visit note below.        [facility]   at the [facility]   Otolaryngology Clinic     Patient: Anuradha Brown    MRN: [id number]    : 1980    Age/Gender: 42 year old female  Date of Service: 2023  Rendering Provider:   Haylie Polo MD     Chief Complaint   Subglottic stenosis  Dyspnea  S/p CO2 laser, CRE balloon dilation, kenalog injection 23  Interval History   HISTORY OF PRESENT ILLNESS: Ms. Brown is a 42 year old female is being followed for dyspnea. She was initially seen on 3/28/23. Please refer to this note for full history.     Today, she presents for follow up. She reports:  - doing well  - best peak flow of 350  - on average though, 300  - breathing feels good  - hoarseness with prolonged talking  - coughs to mucus out but doesn't work  - throat clears too  - peak flow today 350  - denies heartburn     PAST MEDICAL HISTORY:   Past Medical History:   Diagnosis Date    Other and unspecified ovarian cyst 98    ultrasound done       PAST SURGICAL HISTORY:   Past Surgical History:   Procedure Laterality Date    BRONCHOSCOPY FLEXIBLE N/A 2023    Procedure: Bronchoscopy flexible and biopsy;  Surgeon: Haylie Polo MD;  Location: [facility] OR    INJECT STEROID (LOCATION) N/A 2023    Procedure: steroid injection;  Surgeon: Haylie Polo MD;  Location: [facility] OR    LASER CO2 LARYNGOSCOPY N/A 2023    Procedure: carbon dioxide laser resection of stenosis, CRE balloon dilation;  Surgeon: Haylie Polo MD;  Location: [facility] OR       CURRENT MEDICATIONS:   Current Outpatient Medications:     acetaminophen (TYLENOL) 325 MG tablet, Take 2 tablets (650 mg) by mouth every 4  hours as needed for mild pain, Disp: 50 tablet, Rfl: 0    budesonide-formoterol (SYMBICORT) 160-4.5 MCG/ACT Inhaler, Inhale 2 puffs into the lungs 2 times daily, Disp: 6 g, Rfl: 11    neomycin-polymyxin-dexamethasone (MAXITROL) 3.5-18723-3.1 SUSP ophthalmic susp, INSTILL ONE DROP TO LEFT EYE FOUR TIMES DAILY FOR ONE WEEK, THEN THREE TIMES DAILY FOR ONE WEEK, THEN TWICE DAILY FOR ONE WEEK., Disp: , Rfl:     omeprazole (PRILOSEC) 20 MG DR capsule, Take 1 capsule (20 mg) by mouth daily for 120 days, Disp: 30 capsule, Rfl: 3    ondansetron (ZOFRAN ODT) 4 MG ODT tab, Take 1 tablet (4 mg) by mouth every 8 hours as needed for nausea, Disp: 4 tablet, Rfl: 0    OZEMPIC, 1 MG/DOSE, 4 MG/3ML SOPN, INJECT 1MG SUBCUTANEOUSLY EVERY WEEK *rotate injection sites*, Disp: 3 mL, Rfl: 6    senna-docusate (SENOKOT-S/PERICOLACE) 8.6-50 MG tablet, Take 1-2 tablets by mouth 2 times daily, Disp: 30 tablet, Rfl: 0    Spacer/Aero-Holding Chambers (AEROCHAMBER WITH MOUTHPIECE) MISC, USE AS DIRECTED WITH VENTOLIN INHALER, Disp: , Rfl:     ALLERGIES: No known drug allergy    SOCIAL HISTORY:    Social History     Socioeconomic History    Marital status:      Spouse name: Not on file    Number of children: Not on file    Years of education: Not on file    Highest education level: Not on file   Occupational History    Not on file   Tobacco Use    Smoking status: Never    Smokeless tobacco: Never   Vaping Use    Vaping status: Never Used   Substance and Sexual Activity    Alcohol use: Yes     Comment: Rarely    Drug use: Never    Sexual activity: Not on file   Other Topics Concern    Not on file   Social History Narrative    Not on file     Social Determinants of Health     Financial Resource Strain: Not on file   Food Insecurity: Not on file   Transportation Needs: Not on file   Physical Activity: Not on file   Stress: Not on file   Social Connections: Not on file   Intimate Partner Violence: Not on file   Housing Stability: Not on file          FAMILY HISTORY: No family history on file.   Non-contributory for problems with anesthesia    REVIEW OF SYSTEMS:   The patient was asked a 14 point review of systems regarding constitutional symptoms, eye symptoms, ears, nose, mouth, throat symptoms, cardiovascular symptoms, respiratory symptoms, gastrointestinal symptoms, genitourinary symptoms, musculoskeletal symptoms, integumentary symptoms, neurological symptoms, psychiatric symptoms, endocrine symptoms, hematologic/lymphatic symptoms, and allergic/ immunologic symptoms.   The pertinent factors have been included in the HPI and below.  Patient Supplied Answers to Review of Systems      3/28/2023     1:49 PM    ENT ROS   Psychology Frequently feeling anxious   Cardiopulmonary Cough    Breathing problems    Wheezing       Physical Examination   The patient underwent a physical examination as described below. The pertinent positive and negative findings are summarized after the description of the examination.  Constitutional: The patient's developmental and nutritional status was assessed. The patient's voice quality was assessed.  Head and Face: The head and face were inspected for deformities. The sinuses were palpated. The salivary glands were palpated. Facial muscle strength was assessed bilaterally.  Eyes: Extraocular movements and primary gaze alignment were assessed.  Ears, Nose, Mouth and Throat: The ears and nose were examined for deformities. The nasal septum, mucosa, and turbinates were inspected by anterior rhinoscopy. The lips, teeth, and gums were examined for abnormalities. The oral mucosa, tongue, palate, tonsils, lateral and posterior pharynx were inspected for the presence of asymmetry or mucosal lesions.    Neck: The tracheal position was noted, and the neck mass palpated to determine if there were any asymmetries, abnormal neck masses, thyromegally, or thyroid nodules.  Respiratory: The nature of the breathing and chest expansion/symmetry  was observed.  Cardiovascular: The patient was examined to determine the presence of any edema or jugular venous distension.  Abdomen: The contour of the abdomen was noted.  Lymphatic: The patient was examined for infraclavicular lymphadenopathy.  Musculoskeletal: The patient was inspected for the presence of skeletal deformities.  Extremities: The extremities were examined for any clubbing or cyanosis.  Skin: The skin was examined for inflammatory or neoplastic conditions.  Neurologic: The patient's orientation, mood, and affect were noted. The cranial nerve  functions were examined.  Other pertinent positive and negative findings on physical examination:   OC/OP: no lesions, uvula midline, soft palate elevates symmetrically   Neck: no lesions, no TH tenderness to palpation  All other physical examination findings were within normal limits and noncontributory.    Procedures   Flexible laryngoscopy (CPT 09750)        Pre-procedure diagnosis: subglottic stenosis  Post-procedure diagnosis: same as above  Indication for procedure: Ms. Brown is a 42 year old female with see above  Procedure(s): Fiberoptic Laryngoscopy     Details of Procedure: After informed consent was obtained, the patient was seated in the examination chair.  The areas of the nasopharynx as well as the hypopharynx were anesthetized with topical 4% lidocaine with 0.25% phenylephrine atomizer.  Examination of the base of tongue was performed first.  Attention was directed to any evidence of masses in the area or evidence of leukoplakia or candidal infection.  Attention was directed to the epiglottis where its size and position was determined and its movement on phonation of the vowel  e .  The piriform sinuses were then inspected for any mass lesions or pooling of secretions.  Attention was then directed to the larynx. The vocal folds were inspected for infection or any areas of leukoplakia, for masses, polypoid degeneration, or hemorrhage.  Having  done this, the arytenoids and vocal processes were inspected for erythema or evidence of granuloma formation.  The posterior commissure was then inspected for evidence of inflammatory changes in the mucosa and heaping up of mucosal tissue. The patient was then instructed to say the vowel  e .  Adduction of vocal folds to the midline was observed for any evidence of paresis or paralysis of the larynx or asymmetry in rotation of the larynx to the left or right. The patient was asked to breathe and the degree of abduction was noted bilaterally.  Subglottic view of the larynx was obtained for any additional mass lesions or mucosal changes.  Finally the post cricoid was examined for evidence of pooling of secretions, as well as the pharyngeal wall mucosa.   Anesthesia type: 0.25% phenylephrine     Findings:  Anatomic/physiological deviations: RNC, grade 1 5% narrowing, mucus in subglottic space               Right vocal process: No restriction of mobility   Left vocal process: No restriction of mobility  Glottal gap: Complete glottal closure  Supraglottic structures: Normal  Hypopharynx: Normal      Estimated Blood Loss: minimal  Complications: None  Disposition: Patient tolerated the procedure well          Review of Relevant Clinical Data   I personally reviewed:  Pathology:   Surgical Path (4/19/23)  Final Diagnosis  A. LARYNX, SUBGLOTTIS, BIOPSY:  - Fragment of fibrous tissue with chronic inflammation  - No evidence of malignancy    Labs:  Lab Results   Component Value Date    TSH 1.06 08/13/2021     Lab Results   Component Value Date     04/06/2023    CO2 25 04/06/2023    BUN 15.9 04/06/2023     Lab Results   Component Value Date    WBC 6.0 04/06/2023    HGB 14.3 04/06/2023    HCT 43.1 04/06/2023    MCV 89 04/06/2023     04/06/2023     No results found for: PT, PTT, INR  No results found for: DRAKE  No components found for: RHEUMATOIDFACTOR,  RF  No results found for: CRP  No components found for: CKTOT,  "URICACID  No components found for: C3, C4, DSDNAAB, NDNAABIFA  No results found for: MPOAB    Patient reported Quality of Life (QOL) Measures   Patient Supplied Answers To VHI Questionnaire      3/28/2023     2:01 PM   Voice Handicap Index (VHI-10)   My voice makes it difficult for people to hear me 2   People have difficulty understanding me in a noisy room 2   My voice difficulties restrict my personal and social life.  0   I feel left out of conversations because of my voice 0   My voice problem causes me to lose income 0   I feel as though I have to strain to produce voice 2   The clarity of my voice is unpredictable 2   My voice problem upsets me 1   My voice makes me feel handicapped 1   People ask, \"What's wrong with your voice?\" 2   VHI-10 12         Patient Supplied Answers To EAT Questionnaire      3/28/2023     2:02 PM   Eating Assessment Tool (EAT-10)   My swallowing problem has caused me to lose weight 0   My swallowing problem interferes with my ability to go out for meals 0   Swallowing liquids takes extra effort 0   Swallowing solids takes extra effort 0   Swallowing pills takes extra effort 0   Swallowing is painful 0   The pleasure of eating is affected by my swallowing 0   When I swallow food sticks in my throat 0   I cough when I eat 0   Swallowing is stressful 0   EAT-10 0         Patient Supplied Answers To CSI Questionnaire      3/28/2023     2:03 PM   Cough Severity Index (CSI)   My cough is worse when I lie down 1   My coughing problem causes me to restrict my personal and social life 0   I tend to avoid places because of my cough problem 4   I feel embarrassed because of my coughing problem 3   People ask, ''What's wrong?'' because I cough a lot 3   I run out of air when I cough 2   My coughing problem affects my voice 2   My coughing problem limits my physical activity 3   My coughing problem upsets me 2   People ask me if I am sick because I cough a lot 2   CSI Score 22         Patient " Supplied Answers to Dyspnea Index Questionnaire:      3/23/2023    10:54 AM   Dyspnea Index   1. I have trouble getting air in. 1   2. I feel tightness in my throat when I am having boni breathing problem. 2   3. It takes more effort to breathe than it used to. 3   4. Change in weather affect my breathing problem. 3   5. My breathing gets worse with stress. 2   6. I make sound/noise breathing in 1   7. I have to strain to breathe. 2   8. My shortness of breath gets worse with exercise or physical activity 4   9. My breathing problem makes me feel stressed. 2   10. My breathing problem casuses me to restrict my personal and social life. 2   Dyspnea Index Total Score 22       Impression & Plan     IMPRESSION: Ms. Brown is a 42 year old female who is being seen for the following:      Dyspnea  - due to subglottic stenosis   - no intubation history   - has diabetes, on Ozempic, most recent A1C is 6.5 (12/2/22)  - denies history of autoimmune disease  - CT chest 1/30/23 normal  - PFTs 1/27/23 is abnormal, stridor heard on exam  - denies reflux   - BMI is 40  - patient symptoms with shortness of breath with exertion and prolonged talking, one severe episode woke her from sleep and resulted in ER visit, on steroid inhalers but doesn't help  - scope shows grade 3 80% stenosis  - discussed etiology of trauma (intubation), autoimmune, diabetes or idiopathic. In this case this is most likely idiopathic given no prior intubation, but also has component of diabetes, need to rule out autoimmune issues  - discussed treatment with observation, conservative management with oral abx/steroids/reflux precautions, steroid injections, endoscopic procedures, open resection and bypass procedure with tracheotomy  - patient elects to proceed with endoscopic surgery  - s/p CO2 laser, CRE balloon dilation, kenalog injection 4/19/23  - symptoms 5/18/2023 are improved breathing with average peak flow of 300 and max of 350, peak flow today is  350, has mucus sensation with need to cough and throat clear, hoarse voice with prolonged talking, walked up 3 flights of stairs yesterday  - scope shows grade 1 5% narrowing, mucus in subglottic space  - given mucus sensation, recommend nebulizer or Mucinex, has daughter's nebulizer  - recommend voice therapy  - discussed observation with increased physical activity/weight management and close monitoring of diabetes versus conservative management with oral abx/steroids/reflux precautions versus steroid injections  - patient elects observation   Plan  - nebulizer with saline bullets  - stop reflux medications  - increased physical activity  - close monitoring of diabetes  - peak flow meter  - voice therapy  - warm intro for airway study    RETURN VISIT: in September    Haylie Polo MD    Laryngology    [facility]  Department of  Otolaryngology - Head and Neck Surgery  Clin[place] [address]  Appointment line: [phone]  Fax: [phone]  [email or web address]     IMaria Esther, am serving as a scribe to document services personally performed by Haylie Polo MD at this visit, based upon the provider's statements to me. All documentation has been reviewed by the aforementioned provider prior to being entered into the official medical record.        Again, thank you for allowing me to participate in the care of your patient.      Sincerely,    Haylie Polo MD

## 2023-05-18 NOTE — PATIENT INSTRUCTIONS
1.  You were seen in the ENT Clinic today by . If you have any questions or concerns after your appointment, please call [phone]. Press option #1 for scheduling related needs. Press option #3 for Nurse advice.    2.   has recommended  the following:   - use 1-2 saline bullets 1-2 times per day to see if this helps with mucous   - stop your reflux medications   - increase physical activity   - close monitoring of your diabetes to keep strict glucose control   - voice therapy    3.  Plan is to return to clinic in Sept      Heather Jo LPN  [phone]  [facility] - Otolaryngology

## 2023-05-19 ENCOUNTER — VIRTUAL VISIT (OUTPATIENT)
Dept: OTOLARYNGOLOGY | Facility: CLINIC | Age: 43
End: 2023-05-19
Payer: COMMERCIAL

## 2023-05-19 DIAGNOSIS — R49.0 DYSPHONIA: Primary | ICD-10-CM

## 2023-05-19 DIAGNOSIS — J38.6 SUBGLOTTIC STENOSIS: ICD-10-CM

## 2023-05-19 DIAGNOSIS — R06.02 SHORTNESS OF BREATH: ICD-10-CM

## 2023-05-19 PROCEDURE — 92507 TX SP LANG VOICE COMM INDIV: CPT | Mod: GN | Performed by: SPEECH-LANGUAGE PATHOLOGIST

## 2023-05-19 NOTE — LETTER
5/19/2023       RE: Anuradha Brown  [address] [place] [id number]     Dear Colleague,    Thank you for referring your patient, Anuradha Brown, to the [facility] VOICE CLINIC [place] at [facility]. Please see a copy of my visit note below.    Kojo Brown is a 42 year old female who is being evaluated via a billable video visit.      Kojo has been notified and verbally consented to the following:   This video visit will be conducted between you and your provider.  Patient has opted to conduct today's video visit vs an in-person appointment.   Video visits are billed at different rates depending on your insurance coverage. Please reach out to your insurance provider with any questions.   If during the course of the call the provider feels the appointment is not appropriate, you will not be charged for this service.  Provider has received verbal consent for billable virtual visit from the patient? Yes  Will anyone else be joining your video visit? We are joined by SLP Master's student Caren Boyce    Call initiated at: 2:30 PM   Type of Visit Platform Used: [employer] Video  Location of provider: Home  Location of patient: Friend's home in [facility]  Lucas Duncan Jr., M.D., F.A.C.S.  Cherelle Rahman M.D., M.P.H.  Haylie Polo M.D.  Flor Staples, Ph.D., CCC-SLP  Ayo Barber, Ph.D., CCC-SLP  Hannah Kulkarni M.M. (voice), M.A., CCC-SLP  MARC Shahid.S., CCC-SLP  MEENU CeballosS., CCC-SLP  CADY Bustos (voice), M.S., CCC-SLP    [facility]  VOICE/SPEECH/BREATHING THERAPY PROGRESS REPORT    Patient: Anuradha Brown  Date of Service: 5/19/2023    Date of Last Service: 3/24/23 (as well and has a very brief visit yesterday)  Referring physician: Dr. Polo  Initial evaluation: 3/24/23  Therapy Session #1     I had the pleasure of seeing Ms. Brown today, for speech therapy to address a diagnosis  of:  Shortness Of Breath (R06.02)   Subglottic Stenosis (J38.6)  Dysphonia (R49.0).    PROGRESS SINCE LAST SESSION  Ms. Brown was seen for evaluation on 3/24/23, for complaints of shortness of breath.  Laryngeal examination showed subglottic stenosis, and she was referred immediately to Dr. Polo.  Repair of the stenosis was done on 4/19, and Ms. Brown was seen yesterday by Dr. Polo for follow-up.  At that time she mentioned problems with managing secretions, some persistent dyspnea, and also dysphonia.  Dr. Polo referred for further speech therapy.    Ms. Brown states that:  Phlegm and voice are equal problems  She tries to get the phlegm out of the way      Ms. Brown presents today with the following:  High chest breathing that is often in coordinated with phonation  Some apparent difficulty with secretion management  Voice quality:  Clear quality but pressed, with narrow resonance  Pitch is within normal limits, in the range from a 3 to D4  There is frequent secretion noise      THERAPEUTIC ACTIVITIES  Today Ms. Brown participated in the following therapeutic activities:  [place] concepts and techniques for using saline gargling, gentle throat clears, and a hard swallow technique to manage her thickened secretions  She learned the importance of not throat clearing aggressively, as this will just make the secretions worse  [place] exercises for optimal respiratory mechanics for speech and for rest.  I provided explanation of the anatomy and physiology of respiration for speech and singing; she found this to be helpful  she demonstrated clavicular/neck/shoulder involvement in inhalation  demonstrated difficulty allowing abdominal relaxation for inhalation  This was new information and revealing for her  with guidance, learned improved abdominal relaxation for inhalation  practiced in several seated and standing positions  good learning, but will need practice  [place] exercises to add phonation to the optimal  flowing airstream.  Semi-occluded vocal tract exercises with a straw (without water resistance) were most facilitating  Other S OV T configurations, including humming, and a puffed upper lip were also facilitating and helpful for her  She progressed from humming to simple neutral syllables beginning with nasal continuants, and was able to see how this will help her improve her voice  good learning, but will need practice   [place] concepts of an optimal regimen for practice.  she should use an interval schedule of practice, with brief periods of practice frequently throughout each day  An audio recording of today's therapeutic activities was made, to facilitate practice.    IMPRESSIONS/GOALS/PLAN  Ms. Brown had a productive session of speech therapy today, to address the following:  Shortness Of Breath (R06.02)   Subglottic Stenosis (J38.6)   Dysphonia (R49.0)  Speech therapy for her is medically necessary to allow  her to meet personal and professional demands and fully engage in activities of daily living.     She will work on her exercises on a daily basis, and work on incorporating the techniques into her daily activities.    Goals for this practice period:   practice all exercises according to instructions  incorporate techniques into daily vocal activities    Plan: I will see Ms. Brown in a month to work on education, modification, and carryover of therapeutic activities to more complex activities.    Next Clinic Appt: 9/21/23  Plan for SLP: Join session and provide support and therapy as necessary    TOTAL SERVICE TIME: 69 minutes  TREATMENT ([id number])      Flor Staples, Ph.D., [facility]-SLP  Speech-Language Pathologist  Director, [facility]  She/her/hers  [phone]

## 2023-05-19 NOTE — PROGRESS NOTES
Kojo Brown is a 42 year old female who is being evaluated via a billable video visit.      Kojo has been notified and verbally consented to the following:     This video visit will be conducted between you and your provider.    Patient has opted to conduct today's video visit vs an in-person appointment.     Video visits are billed at different rates depending on your insurance coverage. Please reach out to your insurance provider with any questions.     If during the course of the call the provider feels the appointment is not appropriate, you will not be charged for this service.  Provider has received verbal consent for billable virtual visit from the patient? Yes  Will anyone else be joining your video visit? We are joined by SLP Master's student Caren Boyce    Call initiated at: 2:30 PM   Type of Visit Platform Used: Podcast Ready Video  Location of provider: Home  Location of patient: Friend's home in [facility]  Lucas Duncan Jr., M.D., F.A.C.S.  Cherelle Rahman M.D., M.P.H.  Haylie Polo M.D.  Flor Staples, Ph.D., CCC-SLP  Ayo Barber, Ph.D., [facility]-SLP  Hannah Kulkarni M.M. (voice), M.A., CCC-SLP  MARC Shahid.S., CCC-SLP  MARC Ceballos.S., CCC-SLP  CADY Bustos (voice), M.S., CCC-SLP    [facility]  VOICE/SPEECH/BREATHING THERAPY PROGRESS REPORT    Patient: Anuradha Brown  Date of Service: 5/19/2023    Date of Last Service: 3/24/23 (as well and has a very brief visit yesterday)  Referring physician: Dr. Polo  Initial evaluation: 3/24/23  Therapy Session #1     I had the pleasure of seeing Ms. Brown today, for speech therapy to address a diagnosis of:  Shortness Of Breath (R06.02)   Subglottic Stenosis (J38.6)  Dysphonia (R49.0).    PROGRESS SINCE LAST SESSION  Ms. Brown was seen for evaluation on 3/24/23, for complaints of shortness of breath.  Laryngeal examination showed subglottic stenosis, and she was referred immediately to Dr. Polo.  Repair of  the stenosis was done on 4/19, and Ms. Brown was seen yesterday by Dr. Polo for follow-up.  At that time she mentioned problems with managing secretions, some persistent dyspnea, and also dysphonia.  Dr. Polo referred for further speech therapy.    Ms. Brown states that:    Phlegm and voice are equal problems    She tries to get the phlegm out of the way      Ms. Brown presents today with the following:    High chest breathing that is often in coordinated with phonation    Some apparent difficulty with secretion management  Voice quality:    Clear quality but pressed, with narrow resonance    Pitch is within normal limits, in the range from a 3 to D4    There is frequent secretion noise        THERAPEUTIC ACTIVITIES  Today Ms. Brown participated in the following therapeutic activities:    [place] concepts and techniques for using saline gargling, gentle throat clears, and a hard swallow technique to manage her thickened secretions  o She learned the importance of not throat clearing aggressively, as this will just make the secretions worse    [place] exercises for optimal respiratory mechanics for speech and for rest.  o I provided explanation of the anatomy and physiology of respiration for speech and singing; she found this to be helpful  o she demonstrated clavicular/neck/shoulder involvement in inhalation  o demonstrated difficulty allowing abdominal relaxation for inhalation  - This was new information and revealing for her  o with guidance, learned improved abdominal relaxation for inhalation  o practiced in several seated and standing positions  o good learning, but will need practice    [place] exercises to add phonation to the optimal flowing airstream.  o Semi-occluded vocal tract exercises with a straw (without water resistance) were most facilitating  o Other S OV T configurations, including humming, and a puffed upper lip were also facilitating and helpful for her  o She progressed from humming to  simple neutral syllables beginning with nasal continuants, and was able to see how this will help her improve her voice  o good learning, but will need practice     [place] concepts of an optimal regimen for practice.  o she should use an interval schedule of practice, with brief periods of practice frequently throughout each day    An audio recording of today's therapeutic activities was made, to facilitate practice.    IMPRESSIONS/GOALS/PLAN  Ms. Brown had a productive session of speech therapy today, to address the following:  Shortness Of Breath (R06.02)   Subglottic Stenosis (J38.6)   Dysphonia (R49.0)  Speech therapy for her is medically necessary to allow  her to meet personal and professional demands and fully engage in activities of daily living.     She will work on her exercises on a daily basis, and work on incorporating the techniques into her daily activities.    Goals for this practice period:     practice all exercises according to instructions    incorporate techniques into daily vocal activities    Plan: I will see Ms. Brown in a month to work on education, modification, and carryover of therapeutic activities to more complex activities.    Next Clinic Appt: 9/21/23  Plan for SLP: Join session and provide support and therapy as necessary    TOTAL SERVICE TIME: 69 minutes  TREATMENT (27945)      Flor Staples, Ph.D., [facility]-SLP  Speech-Language Pathologist  Director, [facility]  She/her/hers  [phone]

## 2023-05-23 NOTE — PROGRESS NOTES
[facility]  Lucas Duncan Jr., M.D., F.A.C.S.  Cherelle Rahman M.D., M.P.H.  Haylie Polo M.D.  Flor Staples, Ph.D., CCC-SLP  Ayo Barber, Ph.D., [facility]-SLP  Hannah Kulkarni M.M. (voice), M.A., CCC-SLP  Jeana Smith M.S., CCC-SLP  Lizzie Huffman M.S., CCC-SLP  CADY Bustos (voice), M.S., CCC-SLP    [facility]  VOICE EVALUATION/LARYNGEAL EXAMINATION REPORT    Patient: Anuradha Brown  Date of Service: 5/18/2023    HISTORY  PATIENT INFORMATION  Anuradha Brown was seen for brief consultation in conjunction with a visit to Dr. Polo today.  Please refer to Dr. Polo s dictation for a more complete history and impressions.      Ms. Brown is a month out from the repair of her subglottic stenosis.  She is breathing well, but having cough and voice concerns.  I reviewed the video of her laryngeal exam with Dr. Polo, and we agreed to a course of therapy.  This will start tomorrow.    DIAGNOSIS/REASON FOR REFERRAL  Dysphonia/ Evaluate, perform laryngeal exam, treat as appropriate    No charge for today s visit  NO CHARGE FACILITY FEE (39078)    PRIMARY ICD-10 code: R49.0 (Dysphonia)    Flor Staples, Ph.D., [facility]-SLP  Speech-Language Pathologist  Director, [facility]  [phone]

## 2023-06-28 ENCOUNTER — TELEPHONE (OUTPATIENT)
Dept: OTOLARYNGOLOGY | Facility: CLINIC | Age: 43
End: 2023-06-28
Payer: COMMERCIAL

## 2023-06-28 NOTE — TELEPHONE ENCOUNTER
This patient can be scheduled for one or two of these options, per provider.    Virtual Return SLP Voice appt with Flor Staples    Friday, 7/7 @  9:30am or 3:30pm   Friday, 7/21 @  8:30am or 9:30am

## 2023-07-02 ENCOUNTER — HEALTH MAINTENANCE LETTER (OUTPATIENT)
Age: 43
End: 2023-07-02

## 2023-08-07 ENCOUNTER — VIRTUAL VISIT (OUTPATIENT)
Dept: OTOLARYNGOLOGY | Facility: CLINIC | Age: 43
End: 2023-08-07
Payer: COMMERCIAL

## 2023-08-07 DIAGNOSIS — R49.0 DYSPHONIA: Primary | ICD-10-CM

## 2023-08-07 DIAGNOSIS — J38.6 SUBGLOTTIC STENOSIS: ICD-10-CM

## 2023-08-07 DIAGNOSIS — R06.02 SHORTNESS OF BREATH: ICD-10-CM

## 2023-08-07 PROCEDURE — 92507 TX SP LANG VOICE COMM INDIV: CPT | Mod: GN | Performed by: SPEECH-LANGUAGE PATHOLOGIST

## 2023-08-07 NOTE — PROGRESS NOTES
Kojo Brown is a 43 year old female who is being evaluated via a billable video visit.      Kojo has been notified and verbally consented to the following:     This video visit will be conducted between you and your provider.    Patient has opted to conduct today's video visit vs an in-person appointment.     Video visits are billed at different rates depending on your insurance coverage. Please reach out to your insurance provider with any questions.     If during the course of the call the provider feels the appointment is not appropriate, you will not be charged for this service.  Provider has received verbal consent for billable virtual visit from the patient? Yes  Will anyone else be joining your video visit? No    Call initiated at: 8:30 AM   Type of Visit Platform Used: B-152 Video  Location of provider: Home  Location of patient: [facility]  Lucas Duncan Jr., M.D., F.A.C.S.  Cherelle Rahman M.D., M.P.H.  Haylie Polo M.D.  Hannah Kulkarni M.M. (voice), M.A., CCC-SLP  Jeana Smith, M.S., CCC-SLP  Flor Staples, Ph.D., CCC-SLP  Ayo Barber, Ph.D., CCC-SLP  Lizzie Huffman, M.S., CCC-SLP  Toby Singh M.M., M.A., CCC-SLP  CADY Bustos (voice), M.S., CCC-SLP    [facility]  VOICE/SPEECH/BREATHING THERAPY PROGRESS REPORT    Patient: Anuradha Brown  Date of Service: 8/7/2023    Date of Last Service: 5/19/23  Referring physician: Dr. Polo  Initial evaluation: 3/24/23  Therapy Session #2     I had the pleasure of seeing Ms. Brown today, for speech therapy to address a diagnosis of:  Dysphonia (R49.0)   Subglottic Stenosis (J38.6)  Shortness Of Breath (R06.02).    PROGRESS SINCE LAST SESSION  At the last session, Ms. Brown worked on therapeutic activities to address the above diagnosis.    Regarding practice, Ms. Brown reports the following:     She practices her breathing and voice exercises during her long commute every day  o She will occasionally say her  "\"my-oh-my's\" until she runs out of breath, but she has never timed it; she thinks it is essentially normal    Ms. Kevin also states that:    The phlegm is still terrible; it's a constant bother  o Gargling with saline didn't help  o She never got the saline nebulizer; and didn't pursue it because she was very busy over the summer  o She is trying to use strategies to protect her vocal folds so as not to elicit more coughing   o She doesn't really cough any more  o She uses a simple throat-clear to try to get rid of the phlegm; it's not always useful    She does lose her voice; she will be talking and randomly nothing will come out, or it will be a whisper, and then suddenly it's back to normal  o The aphonia lasts a sentence or two  o She's not sure if it's related phlegm  o It happens 1-3 times per week  o She wonders if this is normal for her condition    Her peak meter measures are going very well; her breathing is very good     Ms. Brown presents today with the following:    Good breathing technique; no apparent effort    Inhalation is silent during conversational speech  Voice quality:    WNL; clear and easily flowing    Pitch and volume are very good  Cough/ Throat clear:    None observed    THERAPEUTIC ACTIVITIES  Today Ms. Brown participated in the following therapeutic activities:    Asked many questions about the nature of her symptoms, and I answered all of these thoroughly.  o The phlegm in her trachea is not unexpected, and is not likely to be helped by gargling  o The saline nebulizer treatments should help; I will facilitate have the orders placed again  o The momentary interruptions in phonation are also not unexpected; they can be explained by interruptions in laryngotracheal aerodynamics, that happen when she is not optimizing her respiratory/phonatory technique  - She concedes that it doesn't happen when she is doing her exercises, and is more likely to happen when she is talking fast and " loudly, and not taking time to inhale adequately  - It is likely this would be worse if she wasn't doing her exercises regularly, to optimize her system  - It would take much more practice and vigilance to have the momentary bursts of aphonia never happen; after discussion, we agreed that it is not enough of a bother for her to increase her exercises and awareness to that extent  o The fact that she is no longer coughing means that she was able to use techniques and strategies to arrest and prevent a case of Irritable Larynx Syndrome; this is good    Developed a plan for exercises and self-monitoring going forward  o She will start using the saline nebulizer and report back to Dr. Polo when she sees her in September  o She will continue her regimen of voice exercises, and will do an exercise of timing my-ohs, or a sustained vowel, several times a week  - She should be able to phonate for 10-15 seconds; if the time starts becoming less, it may be a sign of an increase in the stenosis  - If the interruptions in phonation start happening multiple times per day, it may also be an indication of an increase in the stenosis  - She will continue to pay attention to her breathing technique, again monitoring for any changes  o We have agreed she does not need more therapy sessions for now    IMPRESSIONS/GOALS/PLAN  Ms. Brown had a productive session of speech therapy today, to address the following:  Dysphonia (R49.0)   Subglottic Stenosis (J38.6)   Shortness Of Breath (R06.02)  Speech therapy for her is medically necessary to allow  her to meet personal and professional demands and fully engage in activities of daily living.     She will continue to work on her exercises on a daily basis, and work on incorporating the techniques into her daily activities, as well as monitoring her exercises for any changes.  She will start a regimen of saline nebulizer treatments.    Plan: I will see Ms. Brown as Dr. Christ echols  appropriate.    Next Clinic Appt: 9/21/23  Plan for SLP: say hi; ensure things are still going well    TOTAL SERVICE TIME: 41 minutes  TREATMENT ([id number])      Flor Staples, Ph.D., [facility]-SLP  Speech-Language Pathologist  Director, [facility]  She/her/hers  [phone]

## 2023-08-07 NOTE — LETTER
8/7/2023       RE: Anuradha Brown  [address] [place] [id number]     Dear Colleague,    Thank you for referring your patient, Anuradha Brown, to the [facility]ICE [facility]. Please see a copy of my visit note below.    Kojo Brown is a 43 year old female who is being evaluated via a billable video visit.      Kojo has been notified and verbally consented to the following:   This video visit will be conducted between you and your provider.  Patient has opted to conduct today's video visit vs an in-person appointment.   Video visits are billed at different rates depending on your insurance coverage. Please reach out to your insurance provider with any questions.   If during the course of the call the provider feels the appointment is not appropriate, you will not be charged for this service.  Provider has received verbal consent for billable virtual visit from the patient? Yes  Will anyone else be joining your video visit? No    Call initiated at: 8:30 AM   Type of Visit Platform Used: [employer] Video  Location of provider: Home  Location of patient: [facility]  Lucas Duncan Jr., M.D., F.A.C.S.  Cherelle Rahman M.D., M.P.H.  Haylie Polo M.D.  Hannah Kulkarni M.M. (voice), M.A., CCC-SLP  Jeana Smith M.S., CCC-SLP  Flor Staples, Ph.D., CCC-SLP  Ayo Barber, Ph.D., CCC-SLP  Lizzie Huffman M.S., CCC-SLP  Toby Singh M.M., M.A., CCC-SLP  CADY Bustos (voice), M.S., CCC-SLP    [facility]  VOICE/SPEECH/BREATHING THERAPY PROGRESS REPORT    Patient: Anuradah Brown  Date of Service: 8/7/2023    Date of Last Service: 5/19/23  Referring physician: Dr. Polo  Initial evaluation: 3/24/23  Therapy Session #2     I had the pleasure of seeing Ms. Brown today, for speech therapy to address a diagnosis of:  Dysphonia (R49.0)   Subglottic Stenosis (J38.6)  Shortness Of Breath  "(R06.02).    PROGRESS SINCE LAST SESSION  At the last session, Ms. Brown worked on therapeutic activities to address the above diagnosis.    Regarding practice, Ms. Brown reports the following:   She practices her breathing and voice exercises during her long commute every day  She will occasionally say her \"my-oh-my's\" until she runs out of breath, but she has never timed it; she thinks it is essentially normal    Ms. Brown also states that:  The phlegm is still terrible; it's a constant bother  Gargling with saline didn't help  She never got the saline nebulizer; and didn't pursue it because she was very busy over the summer  She is trying to use strategies to protect her vocal folds so as not to elicit more coughing   She doesn't really cough any more  She uses a simple throat-clear to try to get rid of the phlegm; it's not always useful  She does lose her voice; she will be talking and randomly nothing will come out, or it will be a whisper, and then suddenly it's back to normal  The aphonia lasts a sentence or two  She's not sure if it's related phlegm  It happens 1-3 times per week  She wonders if this is normal for her condition  Her peak meter measures are going very well; her breathing is very good     Ms. Brown presents today with the following:  Good breathing technique; no apparent effort  Inhalation is silent during conversational speech  Voice quality:  WNL; clear and easily flowing  Pitch and volume are very good  Cough/ Throat clear:  None observed    THERAPEUTIC ACTIVITIES  Today Ms. Brown participated in the following therapeutic activities:  Asked many questions about the nature of her symptoms, and I answered all of these thoroughly.  The phlegm in her trachea is not unexpected, and is not likely to be helped by gargling  The saline nebulizer treatments should help; I will facilitate have the orders placed again  The momentary interruptions in phonation are also not unexpected; they can be " explained by interruptions in laryngotracheal aerodynamics, that happen when she is not optimizing her respiratory/phonatory technique  She concedes that it doesn't happen when she is doing her exercises, and is more likely to happen when she is talking fast and loudly, and not taking time to inhale adequately  It is likely this would be worse if she wasn't doing her exercises regularly, to optimize her system  It would take much more practice and vigilance to have the momentary bursts of aphonia never happen; after discussion, we agreed that it is not enough of a bother for her to increase her exercises and awareness to that extent  The fact that she is no longer coughing means that she was able to use techniques and strategies to arrest and prevent a case of Irritable Larynx Syndrome; this is good  Developed a plan for exercises and self-monitoring going forward  She will start using the saline nebulizer and report back to Dr. Polo when she sees her in September  She will continue her regimen of voice exercises, and will do an exercise of timing my-ohs, or a sustained vowel, several times a week  She should be able to phonate for 10-15 seconds; if the time starts becoming less, it may be a sign of an increase in the stenosis  If the interruptions in phonation start happening multiple times per day, it may also be an indication of an increase in the stenosis  She will continue to pay attention to her breathing technique, again monitoring for any changes  We have agreed she does not need more therapy sessions for now    IMPRESSIONS/GOALS/PLAN  Ms. Brown had a productive session of speech therapy today, to address the following:  Dysphonia (R49.0)   Subglottic Stenosis (J38.6)   Shortness Of Breath (R06.02)  Speech therapy for her is medically necessary to allow  her to meet personal and professional demands and fully engage in activities of daily living.     She will continue to work on her exercises on a daily  basis, and work on incorporating the techniques into her daily activities, as well as monitoring her exercises for any changes.  She will start a regimen of saline nebulizer treatments.    Plan: I will see Ms. Brown as Dr. Christ montgomeryems appropriate.    Next Clinic Appt: 9/21/23  Plan for SLP: say hi; ensure things are still going well    TOTAL SERVICE TIME: 41 minutes  TREATMENT ([id number])      Flor Staples, Ph.D., [facility]-SLP  Speech-Language Pathologist  Director, [facility]  She/her/hers  [phone]                Again, thank you for allowing me to participate in the care of your patient.      Sincerely,    Flor Staples, SLP

## 2023-08-08 ENCOUNTER — PATIENT OUTREACH (OUTPATIENT)
Dept: OTOLARYNGOLOGY | Facility: CLINIC | Age: 43
End: 2023-08-08
Payer: COMMERCIAL

## 2023-08-08 NOTE — PROGRESS NOTES
Called patient to let her know that she needed to reach out to insurance in order for us to find a medical supply company that would cover a nebulizer in [place]. Patient was understanding and will reach out today. Suzanne Ochoa RN on 8/8/2023 at 9:53 AM

## 2023-08-10 ENCOUNTER — PATIENT OUTREACH (OUTPATIENT)
Dept: OTOLARYNGOLOGY | Facility: CLINIC | Age: 43
End: 2023-08-10
Payer: COMMERCIAL

## 2023-08-10 DIAGNOSIS — J38.6 SUBGLOTTIC STENOSIS: Primary | ICD-10-CM

## 2023-08-10 NOTE — PROGRESS NOTES
Patient called with medical supply company that insurance will cover for the nebulizer. Writer placed new order to [facility], [facility] per patient. Suzanne Ochoa RN on 8/10/2023 at 9:51 AM

## 2023-09-08 DIAGNOSIS — J45.41 MODERATE PERSISTENT ASTHMA WITH ACUTE EXACERBATION: Primary | ICD-10-CM

## 2023-09-08 RX ORDER — BUDESONIDE AND FORMOTEROL FUMARATE DIHYDRATE 160; 4.5 UG/1; UG/1
2 AEROSOL RESPIRATORY (INHALATION) 2 TIMES DAILY
Qty: 10.2 G | Refills: 0 | Status: SHIPPED | OUTPATIENT
Start: 2023-09-08 | End: 2023-11-16

## 2023-09-08 NOTE — TELEPHONE ENCOUNTER
"Routing refill request to provider for review/approval because:  Needs provider review as \"patient requested for removal\"  Was last ordered by hospital provider (radiology)    Last Written Prescription Date:  1/27/23  Last Fill Quantity: 6g,  # refills: 11   Last office visit provider:  4/6/23 with tato    Requested Prescriptions   Pending Prescriptions Disp Refills    budesonide-formoterol (SYMBICORT) 160-4.5 MCG/ACT Inhaler 10.2 g 0     Sig: Inhale 2 puffs into the lungs 2 times daily       Inhaled Steroids Protocol Passed - 9/8/2023 11:41 AM        Passed - Patient is age 12 or older        Passed - Asthma control assessment score within normal limits in last 6 months     Please review ACT score.           Passed - Medication is active on med list        Passed - Recent (6 mo) or future (30 days) visit within the authorizing provider's specialty     Patient had office visit in the last 6 months or has a visit in the next 30 days with authorizing provider or within the authorizing provider's specialty.  See \"Patient Info\" tab in inbasket, or \"Choose Columns\" in Meds & Orders section of the refill encounter.           Long-Acting Beta Agonist Inhalers Protocol  Passed - 9/8/2023 11:41 AM        Passed - Patient is age 12 or older        Passed - Asthma control assessment score within normal limits in last 6 months     Please review ACT score.           Passed - Order for Serevent, Striverdi, or Foradil and pt has steroid inhaler        Passed - Medication is active on med list        Passed - Recent (6 mo) or future (30 days) visit within the authorizing provider's specialty     Patient had office visit in the last 6 months or has a visit in the next 30 days with authorizing provider or within the authorizing provider's specialty.  See \"Patient Info\" tab in inbasket, or \"Choose Columns\" in Meds & Orders section of the refill encounter.                 CHRISTOPHER LUQUE RN 09/08/23 11:41 AM  "

## 2023-09-21 ENCOUNTER — OFFICE VISIT (OUTPATIENT)
Dept: OTOLARYNGOLOGY | Facility: CLINIC | Age: 43
End: 2023-09-21
Payer: COMMERCIAL

## 2023-09-21 DIAGNOSIS — E11.9 TYPE 2 DIABETES MELLITUS WITHOUT COMPLICATION, WITHOUT LONG-TERM CURRENT USE OF INSULIN (H): Primary | ICD-10-CM

## 2023-09-21 DIAGNOSIS — R49.0 DYSPHONIA: Primary | ICD-10-CM

## 2023-09-21 DIAGNOSIS — J38.6 SUBGLOTTIC STENOSIS: Primary | ICD-10-CM

## 2023-09-21 DIAGNOSIS — R49.0 DYSPHONIA: ICD-10-CM

## 2023-09-21 PROCEDURE — 99214 OFFICE O/P EST MOD 30 MIN: CPT | Mod: 25 | Performed by: OTOLARYNGOLOGY

## 2023-09-21 PROCEDURE — 99207 PR NO CHARGE LOS: CPT

## 2023-09-21 PROCEDURE — 31575 DIAGNOSTIC LARYNGOSCOPY: CPT | Performed by: OTOLARYNGOLOGY

## 2023-09-21 RX ORDER — LANCETS
EACH MISCELLANEOUS
Qty: 200 EACH | Refills: 3 | Status: SHIPPED | OUTPATIENT
Start: 2023-09-21

## 2023-09-21 NOTE — LETTER
2023       RE: Anruadha Brown  [address] [facility] [address]     Dear Colleague,    Thank you for referring your patient, Anuradha Brown, to the [facility] [place] at [facility]. Please see a copy of my visit note below.        [facility]   at the [facility]   Otolaryngology Clinic     Patient: Anuradha Brown    MRN: [id number]    : 1980    Age/Gender: 43 year old female  Date of Service: 2023  Rendering Provider:   Haylie Polo MD     Chief Complaint   Subglottic stenosis  Dyspnea  S/p CO2 laser, CRE balloon dilation, kenalog injection 23  Interval History   HISTORY OF PRESENT ILLNESS: Ms. Brown is a 42 year old female is being followed for dyspnea. She was initially seen on 3/28/23. Please refer to this note for full history.     Today, she presents for follow up. she reports:  - doing well  Breathing is good     PAST MEDICAL HISTORY:   Past Medical History:   Diagnosis Date    Other and unspecified ovarian cyst 98    ultrasound done       PAST SURGICAL HISTORY:   Past Surgical History:   Procedure Laterality Date    BRONCHOSCOPY FLEXIBLE N/A 2023    Procedure: Bronchoscopy flexible and biopsy;  Surgeon: Haylie Polo MD;  Location: [facility] OR    INJECT STEROID (LOCATION) N/A 2023    Procedure: steroid injection;  Surgeon: Haylie Polo MD;  Location: [facility] OR    LASER CO2 LARYNGOSCOPY N/A 2023    Procedure: carbon dioxide laser resection of stenosis, CRE balloon dilation;  Surgeon: Haylie Polo MD;  Location: [facility] OR       CURRENT MEDICATIONS:   Current Outpatient Medications:     ACCU-CHEK GUIDE test strip, test TWICE DAILY, Disp: 200 strip, Rfl: 3    acetaminophen (TYLENOL) 325 MG tablet, Take 2 tablets (650 mg) by mouth every 4 hours as needed for mild pain, Disp: 50 tablet, Rfl: 0    budesonide-formoterol (SYMBICORT) 160-4.5 MCG/ACT Inhaler, Inhale 2  puffs into the lungs 2 times daily, Disp: 10.2 g, Rfl: 0    budesonide-formoterol (SYMBICORT) 160-4.5 MCG/ACT Inhaler, Inhale 2 puffs into the lungs 2 times daily, Disp: 6 g, Rfl: 11    neomycin-polymyxin-dexamethasone (MAXITROL) 3.5-69115-4.1 SUSP ophthalmic susp, INSTILL ONE DROP TO LEFT EYE FOUR TIMES DAILY FOR ONE WEEK, THEN THREE TIMES DAILY FOR ONE WEEK, THEN TWICE DAILY FOR ONE WEEK., Disp: , Rfl:     ondansetron (ZOFRAN ODT) 4 MG ODT tab, Take 1 tablet (4 mg) by mouth every 8 hours as needed for nausea, Disp: 4 tablet, Rfl: 0    Semaglutide, 2 MG/DOSE, (OZEMPIC) 8 MG/3ML pen, Inject 2 mg Subcutaneous every 7 days, Disp: 3 mL, Rfl: 11    senna-docusate (SENOKOT-S/PERICOLACE) 8.6-50 MG tablet, Take 1-2 tablets by mouth 2 times daily, Disp: 30 tablet, Rfl: 0    Spacer/Aero-Holding Chambers (AEROCHAMBER WITH MOUTHPIECE) MISC, USE AS DIRECTED WITH VENTOLIN INHALER, Disp: , Rfl:     STATIN NOT PRESCRIBED (INTENTIONAL), Please choose reason not prescribed from choices below., Disp: , Rfl:     ALLERGIES: No known drug allergy    SOCIAL HISTORY:    Social History     Socioeconomic History    Marital status:      Spouse name: Not on file    Number of children: Not on file    Years of education: Not on file    Highest education level: Not on file   Occupational History    Not on file   Tobacco Use    Smoking status: Never    Smokeless tobacco: Never   Vaping Use    Vaping Use: Never used   Substance and Sexual Activity    Alcohol use: Yes     Comment: Rarely    Drug use: Never    Sexual activity: Not on file   Other Topics Concern    Not on file   Social History Narrative    Not on file     Social Determinants of Health     Financial Resource Strain: Not on file   Food Insecurity: Not on file   Transportation Needs: Not on file   Physical Activity: Not on file   Stress: Not on file   Social Connections: Not on file   Interpersonal Safety: Not on file   Housing Stability: Not on file         FAMILY HISTORY: No  family history on file.   Non-contributory for problems with anesthesia    REVIEW OF SYSTEMS:   The patient was asked a 14 point review of systems regarding constitutional symptoms, eye symptoms, ears, nose, mouth, throat symptoms, cardiovascular symptoms, respiratory symptoms, gastrointestinal symptoms, genitourinary symptoms, musculoskeletal symptoms, integumentary symptoms, neurological symptoms, psychiatric symptoms, endocrine symptoms, hematologic/lymphatic symptoms, and allergic/ immunologic symptoms.   The pertinent factors have been included in the HPI and below.  Patient Supplied Answers to Review of Systems      3/28/2023     1:49 PM    ENT ROS   Psychology Frequently feeling anxious   Cardiopulmonary Cough    Breathing problems    Wheezing       Physical Examination   The patient underwent a physical examination as described below. The pertinent positive and negative findings are summarized after the description of the examination.  Constitutional: The patient's developmental and nutritional status was assessed. The patient's voice quality was assessed.  Head and Face: The head and face were inspected for deformities. The sinuses were palpated. The salivary glands were palpated. Facial muscle strength was assessed bilaterally.  Eyes: Extraocular movements and primary gaze alignment were assessed.  Ears, Nose, Mouth and Throat: The ears and nose were examined for deformities. The nasal septum, mucosa, and turbinates were inspected by anterior rhinoscopy. The lips, teeth, and gums were examined for abnormalities. The oral mucosa, tongue, palate, tonsils, lateral and posterior pharynx were inspected for the presence of asymmetry or mucosal lesions.    Neck: The tracheal position was noted, and the neck mass palpated to determine if there were any asymmetries, abnormal neck masses, thyromegally, or thyroid nodules.  Respiratory: The nature of the breathing and chest expansion/symmetry was  observed.  Cardiovascular: The patient was examined to determine the presence of any edema or jugular venous distension.  Abdomen: The contour of the abdomen was noted.  Lymphatic: The patient was examined for infraclavicular lymphadenopathy.  Musculoskeletal: The patient was inspected for the presence of skeletal deformities.  Extremities: The extremities were examined for any clubbing or cyanosis.  Skin: The skin was examined for inflammatory or neoplastic conditions.  Neurologic: The patient's orientation, mood, and affect were noted. The cranial nerve  functions were examined.  Other pertinent positive and negative findings on physical examination:   OC/OP: no lesions, uvula midline, soft palate elevates symmetrically   Neck: no lesions, no TH tenderness to palpation     All other physical examination findings were within normal limits and noncontributory.    Procedures   Flexible laryngoscopy (CPT 08826)        Pre-procedure diagnosis: subglottic stenosis  Post-procedure diagnosis: same as above  Indication for procedure: Ms. Brown is a 42 year old female with see above  Procedure(s): Fiberoptic Laryngoscopy     Details of Procedure: After informed consent was obtained, the patient was seated in the examination chair.  The areas of the nasopharynx as well as the hypopharynx were anesthetized with topical 4% lidocaine with 0.25% phenylephrine atomizer.  Examination of the base of tongue was performed first.  Attention was directed to any evidence of masses in the area or evidence of leukoplakia or candidal infection.  Attention was directed to the epiglottis where its size and position was determined and its movement on phonation of the vowel  e .  The piriform sinuses were then inspected for any mass lesions or pooling of secretions.  Attention was then directed to the larynx. The vocal folds were inspected for infection or any areas of leukoplakia, for masses, polypoid degeneration, or hemorrhage.  Having  done this, the arytenoids and vocal processes were inspected for erythema or evidence of granuloma formation.  The posterior commissure was then inspected for evidence of inflammatory changes in the mucosa and heaping up of mucosal tissue. The patient was then instructed to say the vowel  e .  Adduction of vocal folds to the midline was observed for any evidence of paresis or paralysis of the larynx or asymmetry in rotation of the larynx to the left or right. The patient was asked to breathe and the degree of abduction was noted bilaterally.  Subglottic view of the larynx was obtained for any additional mass lesions or mucosal changes.  Finally the post cricoid was examined for evidence of pooling of secretions, as well as the pharyngeal wall mucosa.   Anesthesia type: 0.25% phenylephrine     Findings:  Anatomic/physiological deviations: RNC, grade 1 5% narrowing, mucus in subglottic space               Right vocal process: No restriction of mobility   Left vocal process: No restriction of mobility  Glottal gap: Complete glottal closure  Supraglottic structures: Normal  Hypopharynx: Normal      Estimated Blood Loss: minimal  Complications: None  Disposition: Patient tolerated the procedure well      Review of Relevant Clinical Data   I personally reviewed:     Labs:  Lab Results   Component Value Date    TSH 1.06 08/13/2021     Lab Results   Component Value Date     04/06/2023    CO2 25 04/06/2023    BUN 15.9 04/06/2023     Lab Results   Component Value Date    WBC 6.0 04/06/2023    HGB 14.3 04/06/2023    HCT 43.1 04/06/2023    MCV 89 04/06/2023     04/06/2023     No results found for: PT, PTT, INR  No results found for: DRAKE  No components found for: RHEUMATOIDFACTOR,  RF  No results found for: CRP  No components found for: CKTOT, URICACID  No components found for: C3, C4, DSDNAAB, NDNAABIFA  No results found for: MPOAB    Patient reported Quality of Life (QOL) Measures   Patient Supplied Answers To I  "Questionnaire      8/7/2023     7:52 AM   Voice Handicap Index (VHI-10)   My voice makes it difficult for people to hear me 2   People have difficulty understanding me in a noisy room 2   My voice difficulties restrict my personal and social life.  1   I feel left out of conversations because of my voice 1   My voice problem causes me to lose income 0   I feel as though I have to strain to produce voice 2   The clarity of my voice is unpredictable 2   My voice problem upsets me 2   My voice makes me feel handicapped 0   People ask, \"What's wrong with your voice?\" 1   VHI-10 13         Patient Supplied Answers To EAT Questionnaire      3/28/2023     2:02 PM   Eating Assessment Tool (EAT-10)   My swallowing problem has caused me to lose weight 0   My swallowing problem interferes with my ability to go out for meals 0   Swallowing liquids takes extra effort 0   Swallowing solids takes extra effort 0   Swallowing pills takes extra effort 0   Swallowing is painful 0   The pleasure of eating is affected by my swallowing 0   When I swallow food sticks in my throat 0   I cough when I eat 0   Swallowing is stressful 0   EAT-10 0         Patient Supplied Answers To CSI Questionnaire      8/7/2023     7:52 AM   Cough Severity Index (CSI)   My cough is worse when I lie down 0   My coughing problem causes me to restrict my personal and social life 0   I tend to avoid places because of my cough problem 0   I feel embarrassed because of my coughing problem 2   People ask, ''What's wrong?'' because I cough a lot 0   I run out of air when I cough 0   My coughing problem affects my voice 2   My coughing problem limits my physical activity 0   My coughing problem upsets me 3   People ask me if I am sick because I cough a lot 0   CSI Score 7         Patient Supplied Answers to Dyspnea Index Questionnaire:      3/23/2023    10:54 AM   Dyspnea Index   1. I have trouble getting air in. 1   2. I feel tightness in my throat when I am having " boni breathing problem. 2   3. It takes more effort to breathe than it used to. 3   4. Change in weather affect my breathing problem. 3   5. My breathing gets worse with stress. 2   6. I make sound/noise breathing in 1   7. I have to strain to breathe. 2   8. My shortness of breath gets worse with exercise or physical activity 4   9. My breathing problem makes me feel stressed. 2   10. My breathing problem casuses me to restrict my personal and social life. 2   Dyspnea Index Total Score 22       Impression & Plan     IMPRESSION: Ms. Brown is a 43 year old female who is being seen for the following:    Dyspnea  - due to subglottic stenosis   - no intubation history   - has diabetes, on Ozempic, most recent A1C is 6.5 (12/2/22)  - denies history of autoimmune disease  - CT chest 1/30/23 normal  - PFTs 1/27/23 is abnormal, stridor heard on exam  - denies reflux   - BMI is 40  - patient symptoms with shortness of breath with exertion and prolonged talking, one severe episode woke her from sleep and resulted in ER visit, on steroid inhalers but doesn't help  - scope shows grade 3 80% stenosis  - discussed etiology of trauma (intubation), autoimmune, diabetes or idiopathic. In this case this is most likely idiopathic given no prior intubation, but also has component of diabetes, need to rule out autoimmune issues  - discussed treatment with observation, conservative management with oral abx/steroids/reflux precautions, steroid injections, endoscopic procedures, open resection and bypass procedure with tracheotomy  - patient elects to proceed with endoscopic surgery  - s/p CO2 laser, CRE balloon dilation, kenalog injection 4/19/23  - symptoms 5/18/2023 are improved breathing with average peak flow of 300 and max of 350, peak flow today is 350, has mucus sensation with need to cough and throat clear, hoarse voice with prolonged talking, walked up 3 flights of stairs yesterday  - scope shows grade 1 5% narrowing, mucus in  subglottic space  - given mucus sensation, recommend nebulizer or Mucinex, has daughter's nebulizer  - recommend voice therapy  - discussed observation with increased physical activity/weight management and close monitoring of diabetes versus conservative management with oral abx/steroids/reflux precautions versus steroid injections  - patient elects observation   - symptoms 9/21/2023 are stable, peak flow is 350, still has phlegm, still working on glucose, not checking too often  - scope shows stable subglottic opening, grade 1, 5% narrowing, mucus   Plan  - nebulizer with saline bullets  - increased physical activity  - close monitoring of diabetes  - peak flow meter         RETURN VISIT: summer of 2024 unless peak flow drops sooner  same day, scope, RNC    Haylie Polo MD    Laryngology    [facility]  Department of  Otolaryngology - Head and Neck Surgery  Clinics & [place] [address]  Appointment line: [phone]  Fax: [phone]  [email or web address]         Again, thank you for allowing me to participate in the care of your patient.      Sincerely,    Haylie Polo MD

## 2023-09-21 NOTE — PATIENT INSTRUCTIONS
1.  You were seen in the ENT Clinic today by Dr. oPlo. If you have any questions or concerns after your appointment, please call [phone]. Press option #1 for scheduling related needs. Press option #3 for Nurse advice.    2.  Dr. Polo has recommended the following:   - Call if peak flow drops below 350 before December     3.  Plan is to return to clinic July 2024      Suzanne Ochoa  [phone]  [facility] - Otolaryngology

## 2023-09-21 NOTE — PROGRESS NOTES
[facility]   at the [facility]   Otolaryngology Clinic     Patient: Anuradha Brown    MRN: [id number]    : 1980    Age/Gender: 43 year old female  Date of Service: 2023  Rendering Provider:   Haylie Polo MD     Chief Complaint   Subglottic stenosis  Dyspnea  S/p CO2 laser, CRE balloon dilation, kenalog injection 23  Interval History   HISTORY OF PRESENT ILLNESS: Ms. Brown is a 42 year old female is being followed for dyspnea. She was initially seen on 3/28/23. Please refer to this note for full history.     Today, she presents for follow up. she reports:  - doing well  Breathing is good     PAST MEDICAL HISTORY:   Past Medical History:   Diagnosis Date    Other and unspecified ovarian cyst 98    ultrasound done       PAST SURGICAL HISTORY:   Past Surgical History:   Procedure Laterality Date    BRONCHOSCOPY FLEXIBLE N/A 2023    Procedure: Bronchoscopy flexible and biopsy;  Surgeon: Haylie Polo MD;  Location: [facility] OR    INJECT STEROID (LOCATION) N/A 2023    Procedure: steroid injection;  Surgeon: Haylie Polo MD;  Location: [facility] OR    LASER CO2 LARYNGOSCOPY N/A 2023    Procedure: carbon dioxide laser resection of stenosis, CRE balloon dilation;  Surgeon: Haylie Polo MD;  Location: [facility] OR       CURRENT MEDICATIONS:   Current Outpatient Medications:     ACCU-CHEK GUIDE test strip, test TWICE DAILY, Disp: 200 strip, Rfl: 3    acetaminophen (TYLENOL) 325 MG tablet, Take 2 tablets (650 mg) by mouth every 4 hours as needed for mild pain, Disp: 50 tablet, Rfl: 0    budesonide-formoterol (SYMBICORT) 160-4.5 MCG/ACT Inhaler, Inhale 2 puffs into the lungs 2 times daily, Disp: 10.2 g, Rfl: 0    budesonide-formoterol (SYMBICORT) 160-4.5 MCG/ACT Inhaler, Inhale 2 puffs into the lungs 2 times daily, Disp: 6 g, Rfl: 11    neomycin-polymyxin-dexamethasone (MAXITROL) 3.5-22062-6.1 SUSP ophthalmic susp, INSTILL ONE DROP TO LEFT EYE FOUR TIMES DAILY FOR ONE WEEK,  THEN THREE TIMES DAILY FOR ONE WEEK, THEN TWICE DAILY FOR ONE WEEK., Disp: , Rfl:     ondansetron (ZOFRAN ODT) 4 MG ODT tab, Take 1 tablet (4 mg) by mouth every 8 hours as needed for nausea, Disp: 4 tablet, Rfl: 0    Semaglutide, 2 MG/DOSE, (OZEMPIC) 8 MG/3ML pen, Inject 2 mg Subcutaneous every 7 days, Disp: 3 mL, Rfl: 11    senna-docusate (SENOKOT-S/PERICOLACE) 8.6-50 MG tablet, Take 1-2 tablets by mouth 2 times daily, Disp: 30 tablet, Rfl: 0    Spacer/Aero-Holding Chambers (AEROCHAMBER WITH MOUTHPIECE) MISC, USE AS DIRECTED WITH VENTOLIN INHALER, Disp: , Rfl:     STATIN NOT PRESCRIBED (INTENTIONAL), Please choose reason not prescribed from choices below., Disp: , Rfl:     ALLERGIES: No known drug allergy    SOCIAL HISTORY:    Social History     Socioeconomic History    Marital status:      Spouse name: Not on file    Number of children: Not on file    Years of education: Not on file    Highest education level: Not on file   Occupational History    Not on file   Tobacco Use    Smoking status: Never    Smokeless tobacco: Never   Vaping Use    Vaping Use: Never used   Substance and Sexual Activity    Alcohol use: Yes     Comment: Rarely    Drug use: Never    Sexual activity: Not on file   Other Topics Concern    Not on file   Social History Narrative    Not on file     Social Determinants of Health     Financial Resource Strain: Not on file   Food Insecurity: Not on file   Transportation Needs: Not on file   Physical Activity: Not on file   Stress: Not on file   Social Connections: Not on file   Interpersonal Safety: Not on file   Housing Stability: Not on file         FAMILY HISTORY: No family history on file.   Non-contributory for problems with anesthesia    REVIEW OF SYSTEMS:   The patient was asked a 14 point review of systems regarding constitutional symptoms, eye symptoms, ears, nose, mouth, throat symptoms, cardiovascular symptoms, respiratory symptoms, gastrointestinal symptoms, genitourinary  symptoms, musculoskeletal symptoms, integumentary symptoms, neurological symptoms, psychiatric symptoms, endocrine symptoms, hematologic/lymphatic symptoms, and allergic/ immunologic symptoms.   The pertinent factors have been included in the HPI and below.  Patient Supplied Answers to Review of Systems      3/28/2023     1:49 PM    ENT ROS   Psychology Frequently feeling anxious   Cardiopulmonary Cough    Breathing problems    Wheezing       Physical Examination   The patient underwent a physical examination as described below. The pertinent positive and negative findings are summarized after the description of the examination.  Constitutional: The patient's developmental and nutritional status was assessed. The patient's voice quality was assessed.  Head and Face: The head and face were inspected for deformities. The sinuses were palpated. The salivary glands were palpated. Facial muscle strength was assessed bilaterally.  Eyes: Extraocular movements and primary gaze alignment were assessed.  Ears, Nose, Mouth and Throat: The ears and nose were examined for deformities. The nasal septum, mucosa, and turbinates were inspected by anterior rhinoscopy. The lips, teeth, and gums were examined for abnormalities. The oral mucosa, tongue, palate, tonsils, lateral and posterior pharynx were inspected for the presence of asymmetry or mucosal lesions.    Neck: The tracheal position was noted, and the neck mass palpated to determine if there were any asymmetries, abnormal neck masses, thyromegally, or thyroid nodules.  Respiratory: The nature of the breathing and chest expansion/symmetry was observed.  Cardiovascular: The patient was examined to determine the presence of any edema or jugular venous distension.  Abdomen: The contour of the abdomen was noted.  Lymphatic: The patient was examined for infraclavicular lymphadenopathy.  Musculoskeletal: The patient was inspected for the presence of skeletal  deformities.  Extremities: The extremities were examined for any clubbing or cyanosis.  Skin: The skin was examined for inflammatory or neoplastic conditions.  Neurologic: The patient's orientation, mood, and affect were noted. The cranial nerve  functions were examined.  Other pertinent positive and negative findings on physical examination:   OC/OP: no lesions, uvula midline, soft palate elevates symmetrically   Neck: no lesions, no TH tenderness to palpation     All other physical examination findings were within normal limits and noncontributory.    Procedures   Flexible laryngoscopy (CPT 21268)        Pre-procedure diagnosis: subglottic stenosis  Post-procedure diagnosis: same as above  Indication for procedure: Ms. Brown is a 42 year old female with see above  Procedure(s): Fiberoptic Laryngoscopy     Details of Procedure: After informed consent was obtained, the patient was seated in the examination chair.  The areas of the nasopharynx as well as the hypopharynx were anesthetized with topical 4% lidocaine with 0.25% phenylephrine atomizer.  Examination of the base of tongue was performed first.  Attention was directed to any evidence of masses in the area or evidence of leukoplakia or candidal infection.  Attention was directed to the epiglottis where its size and position was determined and its movement on phonation of the vowel  e .  The piriform sinuses were then inspected for any mass lesions or pooling of secretions.  Attention was then directed to the larynx. The vocal folds were inspected for infection or any areas of leukoplakia, for masses, polypoid degeneration, or hemorrhage.  Having done this, the arytenoids and vocal processes were inspected for erythema or evidence of granuloma formation.  The posterior commissure was then inspected for evidence of inflammatory changes in the mucosa and heaping up of mucosal tissue. The patient was then instructed to say the vowel  e .  Adduction of vocal folds  to the midline was observed for any evidence of paresis or paralysis of the larynx or asymmetry in rotation of the larynx to the left or right. The patient was asked to breathe and the degree of abduction was noted bilaterally.  Subglottic view of the larynx was obtained for any additional mass lesions or mucosal changes.  Finally the post cricoid was examined for evidence of pooling of secretions, as well as the pharyngeal wall mucosa.   Anesthesia type: 0.25% phenylephrine     Findings:  Anatomic/physiological deviations: RNC, grade 1 5% narrowing, mucus in subglottic space               Right vocal process: No restriction of mobility   Left vocal process: No restriction of mobility  Glottal gap: Complete glottal closure  Supraglottic structures: Normal  Hypopharynx: Normal      Estimated Blood Loss: minimal  Complications: None  Disposition: Patient tolerated the procedure well      Review of Relevant Clinical Data   I personally reviewed:     Labs:  Lab Results   Component Value Date    TSH 1.06 08/13/2021     Lab Results   Component Value Date     04/06/2023    CO2 25 04/06/2023    BUN 15.9 04/06/2023     Lab Results   Component Value Date    WBC 6.0 04/06/2023    HGB 14.3 04/06/2023    HCT 43.1 04/06/2023    MCV 89 04/06/2023     04/06/2023     No results found for: PT, PTT, INR  No results found for: DRAKE  No components found for: RHEUMATOIDFACTOR,  RF  No results found for: CRP  No components found for: CKTOT, URICACID  No components found for: C3, C4, DSDNAAB, NDNAABIFA  No results found for: MPOAB    Patient reported Quality of Life (QOL) Measures   Patient Supplied Answers To VHI Questionnaire      8/7/2023     7:52 AM   Voice Handicap Index (VHI-10)   My voice makes it difficult for people to hear me 2   People have difficulty understanding me in a noisy room 2   My voice difficulties restrict my personal and social life.  1   I feel left out of conversations because of my voice 1   My voice  "problem causes me to lose income 0   I feel as though I have to strain to produce voice 2   The clarity of my voice is unpredictable 2   My voice problem upsets me 2   My voice makes me feel handicapped 0   People ask, \"What's wrong with your voice?\" 1   VHI-10 13         Patient Supplied Answers To EAT Questionnaire      3/28/2023     2:02 PM   Eating Assessment Tool (EAT-10)   My swallowing problem has caused me to lose weight 0   My swallowing problem interferes with my ability to go out for meals 0   Swallowing liquids takes extra effort 0   Swallowing solids takes extra effort 0   Swallowing pills takes extra effort 0   Swallowing is painful 0   The pleasure of eating is affected by my swallowing 0   When I swallow food sticks in my throat 0   I cough when I eat 0   Swallowing is stressful 0   EAT-10 0         Patient Supplied Answers To CSI Questionnaire      8/7/2023     7:52 AM   Cough Severity Index (CSI)   My cough is worse when I lie down 0   My coughing problem causes me to restrict my personal and social life 0   I tend to avoid places because of my cough problem 0   I feel embarrassed because of my coughing problem 2   People ask, ''What's wrong?'' because I cough a lot 0   I run out of air when I cough 0   My coughing problem affects my voice 2   My coughing problem limits my physical activity 0   My coughing problem upsets me 3   People ask me if I am sick because I cough a lot 0   CSI Score 7         Patient Supplied Answers to Dyspnea Index Questionnaire:      3/23/2023    10:54 AM   Dyspnea Index   1. I have trouble getting air in. 1   2. I feel tightness in my throat when I am having boni breathing problem. 2   3. It takes more effort to breathe than it used to. 3   4. Change in weather affect my breathing problem. 3   5. My breathing gets worse with stress. 2   6. I make sound/noise breathing in 1   7. I have to strain to breathe. 2   8. My shortness of breath gets worse with exercise or physical " activity 4   9. My breathing problem makes me feel stressed. 2   10. My breathing problem casuses me to restrict my personal and social life. 2   Dyspnea Index Total Score 22       Impression & Plan     IMPRESSION: Ms. Brown is a 43 year old female who is being seen for the following:    Dyspnea  - due to subglottic stenosis   - no intubation history   - has diabetes, on Ozempic, most recent A1C is 6.5 (12/2/22)  - denies history of autoimmune disease  - CT chest 1/30/23 normal  - PFTs 1/27/23 is abnormal, stridor heard on exam  - denies reflux   - BMI is 40  - patient symptoms with shortness of breath with exertion and prolonged talking, one severe episode woke her from sleep and resulted in ER visit, on steroid inhalers but doesn't help  - scope shows grade 3 80% stenosis  - discussed etiology of trauma (intubation), autoimmune, diabetes or idiopathic. In this case this is most likely idiopathic given no prior intubation, but also has component of diabetes, need to rule out autoimmune issues  - discussed treatment with observation, conservative management with oral abx/steroids/reflux precautions, steroid injections, endoscopic procedures, open resection and bypass procedure with tracheotomy  - patient elects to proceed with endoscopic surgery  - s/p CO2 laser, CRE balloon dilation, kenalog injection 4/19/23  - symptoms 5/18/2023 are improved breathing with average peak flow of 300 and max of 350, peak flow today is 350, has mucus sensation with need to cough and throat clear, hoarse voice with prolonged talking, walked up 3 flights of stairs yesterday  - scope shows grade 1 5% narrowing, mucus in subglottic space  - given mucus sensation, recommend nebulizer or Mucinex, has daughter's nebulizer  - recommend voice therapy  - discussed observation with increased physical activity/weight management and close monitoring of diabetes versus conservative management with oral abx/steroids/reflux precautions versus  steroid injections  - patient elects observation   - symptoms 9/21/2023 are stable, peak flow is 350, still has phlegm, still working on glucose, not checking too often  - scope shows stable subglottic opening, grade 1, 5% narrowing, mucus   Plan  - nebulizer with saline bullets  - increased physical activity  - close monitoring of diabetes  - peak flow meter         RETURN VISIT: summer of 2024 unless peak flow drops sooner  same day, scope, RNC    Haylie Polo MD    Laryngology    [facility]  Department of  Otolaryngology - Head and Neck Surgery  Clinics & Surgery Center  [address] [place] [address]  Appointment line: [phone]  Fax: [phone]  [email or web address]

## 2023-09-22 NOTE — PROGRESS NOTES
Patient was seen briefly in conjunction with a visit to see Dr. Polo.  She states that most of the time voice quality is well within normal limits, and she does not have an urge to cough.  She does feel the episodic accrual of mucus in her throat, and at these times voice will be transiently disrupted until she clears the mucus.  She has not been able to try the saline nebulizer at this point but it has been secured.  She feels that she is managing her voice/cough symptoms effectively and does not need additional care at this time.  As result formal reevaluation is not needed.  Relative to her breathing concerns peak flow meter readings are consistently at 350.  She will be seeing Dr. Polo today for formal evaluation of her stenosis.  Please see Dr. Polo's note from today's date for additional details on that front.      No skilled services were provided as a part of today's brief interaction and there is correspondingly no charge.    -Toby Singh M.M., M.A., CCC-SLP  Speech-Language Pathologist  Certificate of Vocology  [phone]

## 2023-11-02 ENCOUNTER — ALLIED HEALTH/NURSE VISIT (OUTPATIENT)
Dept: EDUCATION SERVICES | Facility: CLINIC | Age: 43
End: 2023-11-02
Payer: COMMERCIAL

## 2023-11-02 VITALS — WEIGHT: 209.5 LBS | HEIGHT: 61 IN | BODY MASS INDEX: 39.55 KG/M2

## 2023-11-02 DIAGNOSIS — E66.9 OBESITY: ICD-10-CM

## 2023-11-02 DIAGNOSIS — E11.9 TYPE 2 DIABETES MELLITUS WITHOUT COMPLICATION, WITHOUT LONG-TERM CURRENT USE OF INSULIN (H): Primary | ICD-10-CM

## 2023-11-02 PROCEDURE — G0108 DIAB MANAGE TRN  PER INDIV: HCPCS | Performed by: DIETITIAN, REGISTERED

## 2023-11-02 RX ORDER — TIRZEPATIDE 2.5 MG/.5ML
2.5 INJECTION, SOLUTION SUBCUTANEOUS
Qty: 2 ML | Refills: 0 | Status: SHIPPED | OUTPATIENT
Start: 2023-11-02 | End: 2024-02-29 | Stop reason: DRUGHIGH

## 2023-11-02 RX ORDER — TIRZEPATIDE 5 MG/.5ML
5 INJECTION, SOLUTION SUBCUTANEOUS
Qty: 2 ML | Refills: 3 | Status: SHIPPED | OUTPATIENT
Start: 2023-11-30 | End: 2024-02-29 | Stop reason: DRUGHIGH

## 2023-11-02 NOTE — PATIENT INSTRUCTIONS
Finish out your Ozempic then start Mounjaro     Start Mounjaro at   2.5 mg weekly x 4 weeks increasing monthly as tolerated  5.0 mg weekly x 4 weeks increasing monthly as tolerated  7.5 mg weekly x 4 weeks increasing monthly as tolerated  10 mg weekly x 4 weeks increasing monthly as tolerated  12.5 mg weekly x 4 weeks increasing monthly as tolerated  15 mg weekly goal dose    Duane pazles      Tuna, HB egg little simms     More stir ray veggie     Darius diet diet     NO more ELIZABETH GUADALUPE    NON - food reward for working out   1x/ week x 1 month = pedicure   2x/ week x 1 month = massage  3x/ week x 2 months.... = tanning membership!!

## 2023-11-02 NOTE — LETTER
11/2/2023         RE: Anuradha Brown  [address] [facility] [address]        Dear Colleague,    Thank you for referring your patient, Anuradha Brown, to the [facility]. Please see a copy of my visit note below.    Diabetes Self-Management Education & Support    Presents for: Type 2 diabetes, obesity    Type of Service: In Person Visit      ASSESSMENT:  Patient last seen 1/19/2022.  Patient started on Ozempic 11/4/2021 and increased from 1 mg to 2 mg over the past 6 months but does not notice any difference between the 2 doses and would like to have improved glucose and weight loss.  Discussed options and decided to finish out current Ozempic supply and start Wegovy with gradual titration and evaluate glucose, appetite suppression and weight loss.    Patient's most recent   Lab Results   Component Value Date    A1C 6.1 03/28/2023     is meeting goal of <7.0    Diabetes knowledge and skills assessment:   Patient is knowledgeable in diabetes management concepts related to: Healthy Eating, Monitoring, Problem Solving, and Healthy Coping    Continue education with the following diabetes management concepts: Being Active, Taking Medication, and Reducing Risks    Based on learning assessment above, most appropriate setting for further diabetes education would be: Individual setting.      PLAN    Finish out your Ozempic then start Mounjaro     Start Mounjaro at   2.5 mg weekly x 4 weeks increasing monthly as tolerated  5.0 mg weekly x 4 weeks increasing monthly as tolerated  7.5 mg weekly x 4 weeks increasing monthly as tolerated  10 mg weekly x 4 weeks increasing monthly as tolerated  12.5 mg weekly x 4 weeks increasing monthly as tolerated  15 mg weekly goal dose    Duane Rhoadesa, HB egg little simms     More stir ray veggie     Darius diet diet     NO more ELIZABETH GUADALUPE    NON - food reward for working out   1x/ week x 1 month = pedicure   2x/ week x 1 month =  "massage  3x/ week x 2 months.... = tanning membership!!       Topics to cover at upcoming visits: As needed for ongoing diabetes self-management and and support    Follow-up: 6 months    See Care Plan for co-developed, patient-state behavior change goals.  AVS provided for patient today.    Education Materials Provided:  Goals for Your Diabetes Care, Carbohydrate Counting, Medication Information on Mounjaro, and My Plate Planner      SUBJECTIVE/OBJECTIVE:  Diabetes education in the past 24mo: No  Diabetes type: Type 2  Disease course: Stable  How confident are you filling out medical forms by yourself:: Quite a bit  Diabetes management related comments/concerns: I have concerns to discuss.  I CRAVE sugar and I feel like I am always hungry.  Cultural Influences/Ethnic Background:  Not  or     Diabetes Symptoms & Complications:  Neuropathy: No  Polydipsia: No  Polyphagia: Yes  Polyuria: No  Visual change: Yes  Slow healing wounds: No  Autonomic neuropathy: No  CVA: No  Heart disease: No  Nephropathy: No  Peripheral neuropathy: No  Peripheral Vascular Disease: No  Retinopathy: No  Sexual dysfunction: No    Patient Problem List and Family Medical History reviewed for relevant medical history, current medical status, and diabetes risk factors.    Vitals:  Ht 1.549 m (5' 1\")   Wt 95 kg (209 lb 8 oz)   BMI 39.58 kg/m    Estimated body mass index is 39.58 kg/m  as calculated from the following:    Height as of this encounter: 1.549 m (5' 1\").    Weight as of this encounter: 95 kg (209 lb 8 oz).   Last 3 BP:   BP Readings from Last 3 Encounters:   04/19/23 111/67   04/06/23 110/66   03/28/23 (!) 121/90       History   Smoking Status     Never   Smokeless Tobacco     Never       Labs:  Lab Results   Component Value Date    A1C 6.1 03/28/2023     Lab Results   Component Value Date     04/19/2023     04/29/2022     Lab Results   Component Value Date     04/29/2022     Direct Measure HDL " "  Date Value Ref Range Status   04/29/2022 57 >=50 mg/dL Final   ]  GFR Estimate   Date Value Ref Range Status   04/06/2023 72 >60 mL/min/1.73m2 Final     Comment:     eGFR calculated using 2021 CKD-EPI equation.     No results found for: \"GFRESTBLACK\"  Lab Results   Component Value Date    CR 0.99 04/06/2023     No results found for: \"MICROALBUMIN\"    Healthy Eating:  Cultural/Samaritan diet restrictions?: No  Meal planning/habits: None, Smaller portions, Frequent snacking  How many times a week on average do you eat food made away from home (restaurant/take-out)?: 3  Meals include: Lunch, Dinner, Morning Snack, Afternoon Snack, Evening Snack  Beverages: Water, Tea, Soda    Being Active:  Barrier to exercise: Time, Physical limitation    Monitoring:  Blood Glucose Meter: Unknown  Times checking blood sugar at home (number): 4  Times checking blood sugar at home (per): Month  Blood glucose trend: No change    90-day average 118 mg/dL out of 7 readings range is 96 to 154 mg/dL    Taking Medications:  Diabetes Medication(s)       Incretin Mimetic Agents       Semaglutide, 2 MG/DOSE, (OZEMPIC) 8 MG/3ML pen    Inject 2 mg Subcutaneous every 7 days     tirzepatide (MOUNJARO) 2.5 MG/0.5ML pen    Inject 2.5 mg Subcutaneous every 7 days     tirzepatide (MOUNJARO) 5 MG/0.5ML pen    Inject 5 mg Subcutaneous every 7 days            Current Treatments: Non-insulin Injectables    Problem Solving:                 Reducing Risks:  CAD Risks: Diabetes Mellitus, Obesity, Sedentary lifestyle, Stress  Has dilated eye exam at least once a year?: Yes  Sees dentist every 6 months?: Yes  Feet checked by healthcare provider in the last year?: No    Healthy Coping:  Informal Support system:: Children, [place] based, Family, Friends, Parent, Spouse, Other  Patient Activation Measure Survey Score:       No data to display                  Care Plan and Education Provided:  GLP-1 administration technique taught today. Patient verbalized " understanding and was able to perform an accurate return demonstration of administration technique. Side effects were discussed, if patient has any abdominal pain, with or without nausea and/or vomiting, stop medication, call provider, clinic or go to the emergency room.  Care Plan: Diabetes   Updates made by Daniela Adhikari RD since 11/2/2023 12:00 AM        Problem: HbA1C Not In Goal         Goal: Establish Regular Follow-Ups with PCP         Task: Discuss with PCP the recommended timing for patient's next follow up visit(s)    Responsible User: Daniela Adhikari RD        Task: Discuss schedule for PCP visits with patient Completed 11/2/2023   Responsible User: Daniela Adhikari RD        Goal: Get HbA1C Level in Goal         Task: Educate patient on diabetes education self-management topics Completed 11/2/2023   Responsible User: Daniela Adhikari RD        Task: Educate patient on benefits of regular glucose monitoring    Responsible User: Daniela Adhikari RD        Task: Refer patient to appropriate extended care team member, as needed (Medication Therapy Management, Behavioral Health, Physical Therapy, etc.)    Responsible User: Daniela Adhikari RD        Task: Discuss diabetes treatment plan with patient Completed 11/2/2023   Responsible User: Daniela Adhikari RD        Problem: Diabetes Self-Management Education Needed to Optimize Self-Care Behaviors         Goal: Understand diabetes pathophysiology and disease progression         Task: Provide education on diabetes pathophysiology and disease progression specfic to patient's diabetes type Completed 11/2/2023   Responsible User: Daniela Adhikari RD        Goal: Healthy Eating - follow a healthy eating pattern for diabetes         Task: Provide education on portion control and consistency in amount, composition and timing of food intake    Responsible User: Daniela Adhikari RD        Task: Provide education on managing carbohydrate intake (carbohydrate counting, plate  planning method, etc.) Completed 11/2/2023   Responsible User: Daniela Adhikari RD        Task: Provide education on weight management Completed 11/2/2023   Responsible User: Daniela Adhikari RD        Task: Provide education on heart healthy eating Completed 11/2/2023   Responsible User: Daniela Adhikari RD        Task: Provide education on eating out    Responsible User: Daniela Adhikari RD        Task: Develop individualized healthy eating plan with patient    Responsible User: Daniela Adhikari RD        Goal: Being Active - get regular physical activity, working up to at least 150 minutes per week         Task: Provide education on relationship of activity to glucose and precautions to take if at risk for low glucose Completed 11/2/2023   Responsible User: Daniela Adhikari RD        Task: Discuss barriers to physical activity with patient Completed 11/2/2023   Responsible User: Daniela Adhikari RD        Task: Develop physical activity plan with patient    Responsible User: Daniela Adhikari RD        Task: Explore community resources including walking groups, assistance programs, and home videos    Responsible User: Daniela Adhikari RD        Goal: Monitoring - monitor glucose and ketones as directed         Task: Provide education on blood glucose monitoring (purpose, proper technique, frequency, glucose targets, interpreting results, when to use glucose control solution, sharps disposal)    Responsible User: Daniela Adhikari RD        Task: Provide education on continuous glucose monitoring (sensor placement, use of schuyler or /reader, understanding glucose trends, alerts and alarms, differences between sensor glucose and blood glucose)    Responsible User: Daniela Adhikari RD        Task: Provide education on ketone monitoring (when to monitor, frequency, etc.)    Responsible User: Daniela Adhikari RD        Goal: Taking Medication - patient is consistently taking medications as directed    This Visit's Progress:  100%   Note:    Patient to take diabetes related medication as directed       Task: Provide education on action of prescribed medication, including when to take and possible side effects    Responsible User: Daniela Adhikari RD        Task: Provide education on insulin and injectable diabetes medications, including administration, storage, site selection and rotation for injection sites Completed 11/2/2023   Responsible User: Daniela Adhikari RD        Task: Discuss barriers to medication adherence with patient and provide management technique ideas as appropriate    Responsible User: Daniela Adhikari RD        Task: Provide education on frequency and refill details of medications    Responsible User: Daniela Adhikari RD        Goal: Problem Solving - know how to prevent and manage short-term diabetes complications         Task: Provide education on high blood glucose - causes, signs/symptoms, prevention and treatment Completed 11/2/2023   Responsible User: Daniela Adhikari RD        Task: Provide education on low blood glucose - causes, signs/symptoms, prevention, treatment, carrying a carbohydrate source at all times, and medical identification Completed 11/2/2023   Responsible User: Daniela Adhikari RD        Task: Provide education on safe travel with diabetes    Responsible User: Daniela Adhikari RD        Task: Provide education on how to care for diabetes on sick days    Responsible User: Daniela Adhikari RD        Task: Provide education on when to call a health care provider    Responsible User: Daniela Adhikari RD        Goal: Reducing Risks - know how to prevent and treat long-term diabetes complications         Task: Provide education on major complications of diabetes, prevention, early diagnostic measures and treatment of complications    Responsible User: Daniela Adhikari RD        Task: Provide education on recommended care for dental, eye and foot health Completed 11/2/2023   Responsible User: Daniela Adhikari  LENA        Task: Provide education on Hemoglobin A1c - goals and relationship to blood glucose levels Completed 11/2/2023   Responsible User: Daniela Adhikari RD        Task: Provide education on recommendations for heart health - lipid levels and goals, blood pressure and goals, and aspirin therapy, if indicated    Responsible User: Daniela Adhikari RD        Task: Provide education on tobacco cessation    Responsible User: Daniela Adhikari RD        Goal: Healthy Coping - use available resources to cope with the challenges of managing diabetes         Task: Discuss recognizing feelings about having diabetes    Responsible User: Daniela Adhikari RD        Task: Provide education on the benefits of making appropriate lifestyle changes Completed 11/2/2023   Responsible User: Daniela Adhikari RD        Task: Provide education on benefits of utilizing support systems    Responsible User: Daniela Adhikari RD        Task: Discuss methods for coping with stress    Responsible User: Daniela Adhikari RD        Task: Provide education on when to seek professional counseling    Responsible User: Daniela Adhikari RD            Time Spent: 60 minutes  Encounter Type: Individual    Any diabetes medication dose changes were made via the CDE Protocol per the patient's referring provider. A copy of this encounter was shared with the provider.;

## 2023-11-10 ENCOUNTER — HOSPITAL ENCOUNTER (OUTPATIENT)
Dept: MAMMOGRAPHY | Facility: CLINIC | Age: 43
Discharge: HOME | End: 2023-11-10
Attending: FAMILY MEDICINE | Admitting: FAMILY MEDICINE
Payer: COMMERCIAL

## 2023-11-10 DIAGNOSIS — Z12.31 VISIT FOR SCREENING MAMMOGRAM: ICD-10-CM

## 2023-11-10 PROCEDURE — 77067 SCR MAMMO BI INCL CAD: CPT

## 2023-11-16 ENCOUNTER — OFFICE VISIT (OUTPATIENT)
Dept: FAMILY MEDICINE | Facility: CLINIC | Age: 43
End: 2023-11-16
Payer: COMMERCIAL

## 2023-11-16 VITALS
DIASTOLIC BLOOD PRESSURE: 58 MMHG | RESPIRATION RATE: 16 BRPM | WEIGHT: 209 LBS | HEIGHT: 61 IN | OXYGEN SATURATION: 96 % | TEMPERATURE: 97.8 F | HEART RATE: 75 BPM | SYSTOLIC BLOOD PRESSURE: 94 MMHG | BODY MASS INDEX: 39.46 KG/M2

## 2023-11-16 DIAGNOSIS — Z11.4 SCREENING FOR HIV (HUMAN IMMUNODEFICIENCY VIRUS): ICD-10-CM

## 2023-11-16 DIAGNOSIS — N92.6 IRREGULAR PERIODS: ICD-10-CM

## 2023-11-16 DIAGNOSIS — Z12.4 CERVICAL CANCER SCREENING: ICD-10-CM

## 2023-11-16 DIAGNOSIS — Z11.59 NEED FOR HEPATITIS C SCREENING TEST: ICD-10-CM

## 2023-11-16 DIAGNOSIS — Z12.11 SCREEN FOR COLON CANCER: Primary | ICD-10-CM

## 2023-11-16 DIAGNOSIS — E11.9 TYPE 2 DIABETES MELLITUS WITHOUT COMPLICATION, WITHOUT LONG-TERM CURRENT USE OF INSULIN (H): ICD-10-CM

## 2023-11-16 PROBLEM — Z20.822 EXPOSURE TO COVID-19 VIRUS: Status: RESOLVED | Noted: 2021-04-07 | Resolved: 2023-11-16

## 2023-11-16 PROBLEM — R06.2 WHEEZING: Status: RESOLVED | Noted: 2021-04-07 | Resolved: 2023-11-16

## 2023-11-16 PROBLEM — J45.20 MILD INTERMITTENT ASTHMA WITHOUT COMPLICATION: Status: RESOLVED | Noted: 2023-03-22 | Resolved: 2023-11-16

## 2023-11-16 PROBLEM — J02.9 SORE THROAT: Status: RESOLVED | Noted: 2021-04-07 | Resolved: 2023-11-16

## 2023-11-16 PROCEDURE — 99207 PR FOOT EXAM NO CHARGE: CPT | Performed by: FAMILY MEDICINE

## 2023-11-16 PROCEDURE — 90677 PCV20 VACCINE IM: CPT | Performed by: FAMILY MEDICINE

## 2023-11-16 PROCEDURE — 90472 IMMUNIZATION ADMIN EACH ADD: CPT | Performed by: FAMILY MEDICINE

## 2023-11-16 PROCEDURE — G0145 SCR C/V CYTO,THINLAYER,RESCR: HCPCS | Performed by: FAMILY MEDICINE

## 2023-11-16 PROCEDURE — 90686 IIV4 VACC NO PRSV 0.5 ML IM: CPT | Performed by: FAMILY MEDICINE

## 2023-11-16 PROCEDURE — 99396 PREV VISIT EST AGE 40-64: CPT | Mod: 25 | Performed by: FAMILY MEDICINE

## 2023-11-16 PROCEDURE — 87624 HPV HI-RISK TYP POOLED RSLT: CPT | Performed by: FAMILY MEDICINE

## 2023-11-16 PROCEDURE — 90471 IMMUNIZATION ADMIN: CPT | Performed by: FAMILY MEDICINE

## 2023-11-16 ASSESSMENT — ENCOUNTER SYMPTOMS
HEARTBURN: 0
MYALGIAS: 0
EYE PAIN: 0
HEMATURIA: 0
BREAST MASS: 0
HEADACHES: 0
WEAKNESS: 0
ABDOMINAL PAIN: 0
CHILLS: 0
FREQUENCY: 0
ARTHRALGIAS: 0
DIZZINESS: 0
COUGH: 0
SHORTNESS OF BREATH: 0
NERVOUS/ANXIOUS: 1
DYSURIA: 0
SORE THROAT: 0
PALPITATIONS: 0
PARESTHESIAS: 0
FEVER: 0
JOINT SWELLING: 0
HEMATOCHEZIA: 0
NAUSEA: 0
DIARRHEA: 0
CONSTIPATION: 0

## 2023-11-16 ASSESSMENT — PATIENT HEALTH QUESTIONNAIRE - PHQ9
SUM OF ALL RESPONSES TO PHQ QUESTIONS 1-9: 7
SUM OF ALL RESPONSES TO PHQ QUESTIONS 1-9: 7
10. IF YOU CHECKED OFF ANY PROBLEMS, HOW DIFFICULT HAVE THESE PROBLEMS MADE IT FOR YOU TO DO YOUR WORK, TAKE CARE OF THINGS AT HOME, OR GET ALONG WITH OTHER PEOPLE: NOT DIFFICULT AT ALL

## 2023-11-16 ASSESSMENT — ASTHMA QUESTIONNAIRES: ACT_TOTALSCORE: 25

## 2023-11-16 NOTE — PROGRESS NOTES
SUBJECTIVE:   Kojo is a 43 year old, presenting for the following:  Physical        Healthy Habits:     Getting at least 3 servings of Calcium per day:  Yes    Bi-annual eye exam:  Yes    Dental care twice a year:  Yes    Sleep apnea or symptoms of sleep apnea:  None    Diet:  Regular (no restrictions)    Frequency of exercise:  2-3 days/week    Duration of exercise:  30-45 minutes    Taking medications regularly:  Yes    Barriers to taking medications:  None    Medication side effects:  None    Additional concerns today:  Yes      Today's PHQ-9 Score:       11/16/2023     2:18 PM   PHQ-9 SCORE   PHQ-9 Total Score MyChart 7 (Mild depression)   PHQ-9 Total Score 7         Asthma Follow-Up    Was ACT completed today?  Yes        11/16/2023     2:22 PM   ACT Total Scores   ACT TOTAL SCORE (Goal Greater than or Equal to 20) 25   In the past 12 months, how many times did you visit the emergency room for your asthma without being admitted to the hospital? 0   In the past 12 months, how many times were you hospitalized overnight because of your asthma? 0        How many days per week do you miss taking your asthma controller medication?  0  Please describe any recent triggers for your asthma: Patient is unaware of triggers  Have you had any Emergency Room Visits, Urgent Care Visits, or Hospital Admissions since your last office visit?  No  Have you ever done Advance Care Planning? (For example, a Health Directive, POLST, or a discussion with a medical provider or your loved ones about your wishes): No, advance care planning information given to patient to review.  Advanced care planning was discussed at today's visit.    Social History     Tobacco Use    Smoking status: Never     Passive exposure: Never    Smokeless tobacco: Never   Substance Use Topics    Alcohol use: Yes     Comment: Rarely             11/16/2023     2:21 PM   Alcohol Use   Prescreen: >3 drinks/day or >7 drinks/week? No     Reviewed orders with  patient.  Reviewed health maintenance and updated orders accordingly - Yes      Breast Cancer Screenin/6/2023    12:44 PM   Breast CA Risk Assessment (FHS-7)   Do you have a family history of breast, colon, or ovarian cancer? No / Unknown         Mammogram Screening: Recommended annual mammography  Pertinent mammograms are reviewed under the imaging tab.    History of abnormal Pap smear: YES - other categories - see link Cervical Cytology Screening Guidelines     Reviewed and updated as needed this visit by clinical staff   Tobacco  Allergies  Meds              Reviewed and updated as needed this visit by Provider                 Diabetes Follow-up    How often are you checking your blood sugar? A few times a week  What time of day are you checking your blood sugars (select all that apply)?  Before and after meals  Have you had any blood sugars above 200?  No  Have you had any blood sugars below 70?  Not applicable  What symptoms do you notice when your blood sugar is low?  Shaky, Dizzy, Weak, and Lethargy  What concerns do you have today about your diabetes? None   Do you have any of these symptoms? (Select all that apply)  No numbness or tingling in feet.  No redness, sores or blisters on feet.  No complaints of excessive thirst.  No reports of blurry vision.  No significant changes to weight.      BP Readings from Last 2 Encounters:   23 94/58   23 111/67     Hemoglobin A1C (%)   Date Value   2023 6.1 (H)   2022 6.5 (H)     LDL Cholesterol Calculated   Date Value   2022 121 mg/dL (H)   2021 116 (H)           Review of Systems   Constitutional:  Negative for chills and fever.   HENT:  Negative for congestion, ear pain, hearing loss and sore throat.    Eyes:  Negative for pain and visual disturbance.   Respiratory:  Negative for cough and shortness of breath.    Cardiovascular:  Negative for chest pain, palpitations and peripheral edema.   Gastrointestinal:   "Negative for abdominal pain, constipation, diarrhea, heartburn, hematochezia and nausea.   Breasts:  Negative for tenderness, breast mass and discharge.   Genitourinary:  Negative for dysuria, frequency, genital sores, hematuria, pelvic pain, urgency, vaginal bleeding and vaginal discharge.   Musculoskeletal:  Negative for arthralgias, joint swelling and myalgias.   Skin:  Negative for rash.   Neurological:  Negative for dizziness, weakness, headaches and paresthesias.   Psychiatric/Behavioral:  Positive for mood changes. The patient is nervous/anxious.      CONSTITUTIONAL: NEGATIVE for fever, chills, change in weight  INTEGUMENTARU/SKIN: NEGATIVE for worrisome rashes, moles or lesions  EYES: NEGATIVE for vision changes or irritation  ENT: NEGATIVE for ear, mouth and throat problems  RESP: NEGATIVE for significant cough or SOB  BREAST: NEGATIVE for masses, tenderness or discharge  CV: NEGATIVE for chest pain, palpitations or peripheral edema  GI: NEGATIVE for nausea, abdominal pain, heartburn, or change in bowel habits  : NEGATIVE for unusual urinary or vaginal symptoms. Periods are regular.  MUSCULOSKELETAL: NEGATIVE for significant arthralgias or myalgia  NEURO: NEGATIVE for weakness, dizziness or paresthesias  PSYCHIATRIC: NEGATIVE for changes in mood or affect     OBJECTIVE:   BP 94/58 (BP Location: Right arm, Patient Position: Sitting)   Pulse 75   Temp 97.8  F (36.6  C) (Tympanic)   Resp 16   Ht 1.549 m (5' 1\")   Wt 94.8 kg (209 lb)   LMP 11/08/2023 (Exact Date)   SpO2 96%   BMI 39.49 kg/m    Physical Exam  GENERAL: healthy, alert and no distress  NECK: no adenopathy, no asymmetry, masses, or scars and thyroid normal to palpation  RESP: lungs clear to auscultation - no rales, rhonchi or wheezes  CV: regular rate and rhythm, normal S1 S2, no S3 or S4, no murmur, click or rub, no peripheral edema and peripheral pulses strong   (female): normal female external genitalia, normal urethral meatus, " "vaginal mucosa, normal cervix/adnexa/uterus without masses or discharge        ASSESSMENT/PLAN:       ICD-10-CM    1. Screen for colon cancer  Z12.11 Colonoscopy Screening  Referral      2. Screening for HIV (human immunodeficiency virus)  Z11.4 HIV Antigen Antibody Combo      3. Need for hepatitis C screening test  Z11.59 Hepatitis C Screen Reflex to HCV RNA Quant and Genotype      4. Cervical cancer screening  Z12.4 Pap Screen with HPV - recommended age 30 - 65 years      5. Type 2 diabetes mellitus without complication, without long-term current use of insulin (H)  E11.9 Lipid panel reflex to direct LDL Non-fasting     Albumin Random Urine Quantitative with Creat Ratio     HEMOGLOBIN A1C     FOOT EXAM      6. Irregular periods  N92.6 Adult Uro/Gyn  Referral          Patient has been advised of split billing requirements and indicates understanding: Yes      COUNSELING:  Reviewed preventive health counseling, as reflected in patient instructions      BMI:   Estimated body mass index is 39.49 kg/m  as calculated from the following:    Height as of this encounter: 1.549 m (5' 1\").    Weight as of this encounter: 94.8 kg (209 lb).   Weight management plan: Discussed healthy diet and exercise guidelines      She reports that she has never smoked. She has never been exposed to tobacco smoke. She has never used smokeless tobacco.        Inocente Gutierrez MD  [facility]  Answers submitted by the patient for this visit:  Patient Health Questionnaire (Submitted on 11/16/2023)  If you checked off any problems, how difficult have these problems made it for you to do your work, take care of things at home, or get along with other people?: Not difficult at all  PHQ9 TOTAL SCORE: 7    "

## 2023-11-17 ENCOUNTER — TELEPHONE (OUTPATIENT)
Dept: FAMILY MEDICINE | Facility: CLINIC | Age: 43
End: 2023-11-17
Payer: COMMERCIAL

## 2023-11-17 NOTE — TELEPHONE ENCOUNTER
Prior Authorization Request   Who s requesting:  Pharmacy  Pharmacy Name and Location: [facility]  Medication Name: Mounjaro 2.5 mg/0.5 mL pen  Insurance Plan: [employer]  Key: None

## 2023-11-19 ENCOUNTER — HEALTH MAINTENANCE LETTER (OUTPATIENT)
Age: 43
End: 2023-11-19

## 2023-11-21 LAB
BKR LAB AP GYN ADEQUACY: NORMAL
BKR LAB AP GYN INTERPRETATION: NORMAL
BKR LAB AP HPV REFLEX: NORMAL
BKR LAB AP LMP: NORMAL
BKR LAB AP PREVIOUS ABNORMAL: NORMAL
PATH REPORT.COMMENTS IMP SPEC: NORMAL
PATH REPORT.COMMENTS IMP SPEC: NORMAL
PATH REPORT.RELEVANT HX SPEC: NORMAL

## 2023-11-22 NOTE — TELEPHONE ENCOUNTER
PRIOR AUTHORIZATION DENIED    Medication: MOUNJARO 2.5 MG/0.5ML SC SOPN  Insurance Company: [employer] - Phone [phone] Fax [phone]  Denial Date: 11/22/2023  Denial Rational:     Appeal Information:     Patient Notified: No, care team must notify of denials

## 2023-11-22 NOTE — TELEPHONE ENCOUNTER
PA Initiation    Medication: MOUNJARO 2.5 MG/0.5ML SC SOPN  Insurance Company: [employer] - Phone [phone] Fax [phone]  Pharmacy Filling the Rx: [facility] - [address] [place] [address]  Filling Pharmacy Phone: [phone]  Filling Pharmacy Fax: [phone]  Start Date: 11/22/2023

## 2023-11-24 LAB
HUMAN PAPILLOMA VIRUS 16 DNA: NEGATIVE
HUMAN PAPILLOMA VIRUS 18 DNA: NEGATIVE
HUMAN PAPILLOMA VIRUS FINAL DIAGNOSIS: NORMAL
HUMAN PAPILLOMA VIRUS OTHER HR: NEGATIVE

## 2023-11-29 ENCOUNTER — PATIENT OUTREACH (OUTPATIENT)
Dept: FAMILY MEDICINE | Facility: CLINIC | Age: 43
End: 2023-11-29
Payer: COMMERCIAL

## 2023-11-29 PROBLEM — Z12.4 CERVICAL CANCER SCREENING: Status: ACTIVE | Noted: 2023-11-29

## 2023-12-05 ENCOUNTER — OFFICE VISIT (OUTPATIENT)
Dept: OBGYN | Facility: CLINIC | Age: 43
End: 2023-12-05
Attending: OBSTETRICS & GYNECOLOGY
Payer: COMMERCIAL

## 2023-12-05 ENCOUNTER — TELEPHONE (OUTPATIENT)
Dept: OBGYN | Facility: CLINIC | Age: 43
End: 2023-12-05

## 2023-12-05 VITALS
WEIGHT: 205.5 LBS | HEART RATE: 82 BPM | SYSTOLIC BLOOD PRESSURE: 106 MMHG | DIASTOLIC BLOOD PRESSURE: 66 MMHG | BODY MASS INDEX: 38.8 KG/M2 | HEIGHT: 61 IN

## 2023-12-05 DIAGNOSIS — N92.4 EXCESSIVE BLEEDING IN PREMENOPAUSAL PERIOD: Primary | ICD-10-CM

## 2023-12-05 DIAGNOSIS — N92.6 IRREGULAR PERIODS: ICD-10-CM

## 2023-12-05 DIAGNOSIS — N93.9 ABNORMAL UTERINE BLEEDING: Primary | ICD-10-CM

## 2023-12-05 PROCEDURE — 88305 TISSUE EXAM BY PATHOLOGIST: CPT | Mod: 26 | Performed by: STUDENT IN AN ORGANIZED HEALTH CARE EDUCATION/TRAINING PROGRAM

## 2023-12-05 PROCEDURE — 250N000011 HC RX IP 250 OP 636: Performed by: OBSTETRICS & GYNECOLOGY

## 2023-12-05 PROCEDURE — 88305 TISSUE EXAM BY PATHOLOGIST: CPT | Mod: TC | Performed by: OBSTETRICS & GYNECOLOGY

## 2023-12-05 PROCEDURE — 58100 BIOPSY OF UTERUS LINING: CPT | Performed by: OBSTETRICS & GYNECOLOGY

## 2023-12-05 PROCEDURE — 99203 OFFICE O/P NEW LOW 30 MIN: CPT | Mod: 25 | Performed by: OBSTETRICS & GYNECOLOGY

## 2023-12-05 PROCEDURE — 58300 INSERT INTRAUTERINE DEVICE: CPT | Performed by: OBSTETRICS & GYNECOLOGY

## 2023-12-05 RX ORDER — TRANEXAMIC ACID 650 MG/1
1300 TABLET ORAL 3 TIMES DAILY
Qty: 30 TABLET | Refills: 3 | Status: SHIPPED | OUTPATIENT
Start: 2023-12-05

## 2023-12-05 RX ADMIN — LEVONORGESTREL 1 EACH: 52 INTRAUTERINE DEVICE INTRAUTERINE at 16:00

## 2023-12-05 NOTE — PATIENT INSTRUCTIONS
Thank you for trusting us with your care!     If you need to contact us for questions about:  Symptoms, Scheduling & Medical Questions; Non-urgent (2-3 day response) Arelis message, Urgent (needing response today) [phone] (if after 3:30pm next day response)   Prescriptions: Please call your Pharmacy   Billing: Omar [phone] or [facility]:[phone]

## 2023-12-05 NOTE — NURSING NOTE
Chief Complaint   Patient presents with    Consult     Heavy bleeding and shorter cycles between menstrual cycles. Would like to have a consult about ablation.        See MILLY Kim 12/5/2023

## 2023-12-05 NOTE — LETTER
"2023       RE: Anuradha Brown  [address] [facility] [address]     Dear Colleague,    Thank you for referring your patient, Anuradha Brown, to the [facility] [place] at . Please see a copy of my visit note below.    [facility]  New Patient Visit    SUBJECTIVE   Anuradha Brown is a 43 year old  who presents for consultation for AUB. In  after her last child, the patient had a nexplanon placed and had no periods or bleeding/spotting. In , the patient had her nexplanon removed and replaced; however she began to have heavy bleeding. Her provider at the time put her on a month of OCPs to try to manage bleeding, which had no effect. After 4 months of heavy bleeding, she had the nexplanon removed, figuring that, \"it wasn't doing anything - I may as well take it out.\"Had US at that time which showed \"enlarged uterus\" per her report.     Since then, her periods have shortened in duration from q4 weeks to q3 weeks. She has 5 days of heavy bleeding (she alternates heavy/light periods. During her heavy periods, she changes her pad >1x/hour with quarter-sized clots. During light periods, she changes it every 2-3 hours) followed by 2-3 days of spotting. The patient reports bad cramping during her periods, and at the mid-point of her cycle when she's ovulating. The patient doesn't take any medication for pain and is not on any blood-thinners. Patient endorses fatigue. Denies any urinary symptoms or GI symptoms.    Gynecologic History  Patient's last menstrual period was 2023 (exact date).   Menstrual History:      2023     1:00 PM 2023     6:54 AM 2023     5:30 PM   Menstrual History   LAST MENSTRUAL PERIOD 3/22/2023 2023 2023       Current contraception: vasectomy  Number of partners in last year: 1    History of abnormal Pap smear: Yes: LEEP in , pap normal since. "     Obstetric History  OB History    Para Term  AB Living   5 5 5 0 0 5   SAB IAB Ectopic Multiple Live Births   0 0 0 0 0      # Outcome Date GA Lbr Erasmo/2nd Weight Sex Delivery Anes PTL Lv   5 Term      Vag-Spont      4 Term      Vag-Spont      3 Term      Vag-Spont      2 Term      Vag-Spont      1 Term      Vag-Spont           Past Medical History  Past Medical History:   Diagnosis Date    Other and unspecified ovarian cyst 98    ultrasound done       Past Surgical History  Past Surgical History:   Procedure Laterality Date    BRONCHOSCOPY FLEXIBLE N/A 2023    Procedure: Bronchoscopy flexible and biopsy;  Surgeon: Haylie Polo MD;  [facility]    INJECT STEROID (LOCATION) N/A 2023    Procedure: steroid injection;  Surgeon: Haylie Polo MD;  [facility]    LASER CO2 LARYNGOSCOPY N/A 2023    Procedure: carbon dioxide laser resection of stenosis, CRE balloon dilation;  Surgeon: Haylie Polo MD;  Location: [facility]       Medications  Current Outpatient Medications   Medication    ACCU-CHEK GUIDE test strip    blood glucose monitoring (SOFTCLIX) lancets    Semaglutide, 2 MG/DOSE, (OZEMPIC) 8 MG/3ML pen    tranexamic acid (LYSTEDA) 650 MG tablet    STATIN NOT PRESCRIBED (INTENTIONAL)    tirzepatide (MOUNJARO) 2.5 MG/0.5ML pen    tirzepatide (MOUNJARO) 5 MG/0.5ML pen     No current facility-administered medications for this visit.       Allergies     Allergies   Allergen Reactions    No Known Drug Allergy        Social History  Social History     Tobacco Use    Smoking status: Never     Passive exposure: Never    Smokeless tobacco: Never   Vaping Use    Vaping Use: Never used   Substance Use Topics    Alcohol use: Yes     Comment: Rarely    Drug use: Never       Family History  History reviewed. No pertinent family history.    Review of Systems  Negative except for what was noted in HPI    OBJECTIVE   /66 (BP Location: Left arm, Patient Position: Chair)   Pulse 82   Ht  "1.549 m (5' 1\")   Wt 93.2 kg (205 lb 8 oz)   LMP 2023 (Exact Date)   BMI 38.83 kg/m    BMI: Body mass index is 38.83 kg/m .  General:  Alert, no distress   Head:  Normocephalic, without obvious abnormality   Lungs:  No increased work of breathing   Heart:  Extremities well perfused   Abdomen:  Soft, non-tender, non-distended, bowel sounds normal   Pelvic: -nefg  -bladder wnl, well supported  -vagina normal without discharge  -cervix normal in appearance, no masses  -anus wnl           PROCEDURE NOTE: -- IUD Insertion & Endometrial Biopsy    Reason for Insertion: abnormal uterine bleeding    Under sterile technique, cervix was visualized with speculum and prepped with Betadine solution swab x 3. Tenaculum was placed for stability. The uterus was gently straightened and the endometrial pipelle was advanced without difficulty and a sample collected. One additional pass was made. The uterus sounded to 10.0 cm. IUD prepared for placement, and IUD inserted according to 's instructions without difficulty or significant resitance, and deployed at the fundus. The strings were visualized and trimmed to 3.0 cm from the external os. Tenaculum was removed and hemostasis noted. Speculum removed.  Patient tolerated procedure well.    IUD:  Lot # [id number]  Exp: 2026    EBL: 0  Complications: None apparent  Pathology: EMB sample was sent to pathology.  Tolerance of Procedure: Patient tolerated the procedure well.     ASSESSMENT/PLAN   Anuradha Brown is a 43 year old , here today for consultation for heavy menstrual bleeding and shortened menstrual interval. This could be due to ovulatory decline with anovulatory cycles, hyperplasia/carcinoma, or structural lesions.    # Abnormal uterine bleeding  Due to the patient's age and persistent bleeding, an EMB was collected in clinic today.   Management of AUB was discussed including non-hormonal options, hormonal options, LARCs, and surgical options " including ablation/hysterectomy.    The patient would like to stay away from OCPs/nexplanon, as those didn't work before and she's worried about potential weight gain with OCPs. The patient is interested in the IUD and endometrial ablation. After discussion, the patient opted for an IUD with TXA for breakthrough heavy bleeding. If this doesn't work, she'd like to discuss an endometrial ablation.    - EMB today  - Schedule U/S at most convenient location  - IUD placement today  - TXA rx provided for any HMB.    RTC in 3-4 months if bleeding has not stopped, PRN if it has    Patient staffed with Dr. Brannon Valenzuela MD  Obstetrics and Gynecology, PGY-1  12/05/23      Appreciate note by Dr. Valenzuela. Patient has been seen and examined by me with the resident, agree with above note. I was present for above procedure.     Bisi South MD

## 2023-12-05 NOTE — PROGRESS NOTES
"[facility]  New Patient Visit    SUBJECTIVE   Anuradha Brown is a 43 year old  who presents for consultation for AUB. In  after her last child, the patient had a nexplanon placed and had no periods or bleeding/spotting. In , the patient had her nexplanon removed and replaced; however she began to have heavy bleeding. Her provider at the time put her on a month of OCPs to try to manage bleeding, which had no effect. After 4 months of heavy bleeding, she had the nexplanon removed, figuring that, \"it wasn't doing anything - I may as well take it out.\"Had US at that time which showed \"enlarged uterus\" per her report.     Since then, her periods have shortened in duration from q4 weeks to q3 weeks. She has 5 days of heavy bleeding (she alternates heavy/light periods. During her heavy periods, she changes her pad >1x/hour with quarter-sized clots. During light periods, she changes it every 2-3 hours) followed by 2-3 days of spotting. The patient reports bad cramping during her periods, and at the mid-point of her cycle when she's ovulating. The patient doesn't take any medication for pain and is not on any blood-thinners. Patient endorses fatigue. Denies any urinary symptoms or GI symptoms.    Gynecologic History  Patient's last menstrual period was 2023 (exact date).   Menstrual History:      2023     1:00 PM 2023     6:54 AM 2023     5:30 PM   Menstrual History   LAST MENSTRUAL PERIOD 3/22/2023 2023 2023       Current contraception: vasectomy  Number of partners in last year: 1    History of abnormal Pap smear: Yes: LEEP in , pap normal since.     Obstetric History  OB History    Para Term  AB Living   5 5 5 0 0 5   SAB IAB Ectopic Multiple Live Births   0 0 0 0 0      # Outcome Date GA Lbr Erasmo/2nd Weight Sex Delivery Anes PTL Lv   5 Term      Vag-Spont      4 Term      Vag-Spont      3 Term      Vag-Spont      2 Term      Vag-Spont    " "  1 Term      Vag-Spont           Past Medical History  Past Medical History:   Diagnosis Date    Other and unspecified ovarian cyst 9/23/98    ultrasound done       Past Surgical History  Past Surgical History:   Procedure Laterality Date    BRONCHOSCOPY FLEXIBLE N/A 4/19/2023    Procedure: Bronchoscopy flexible and biopsy;  Surgeon: Haylie Polo MD;  [facility]    INJECT STEROID (LOCATION) N/A 4/19/2023    Procedure: steroid injection;  Surgeon: Haylie Polo MD;  [facility]    LASER CO2 LARYNGOSCOPY N/A 4/19/2023    Procedure: carbon dioxide laser resection of stenosis, CRE balloon dilation;  Surgeon: Haylie Polo MD;  [facility]       Medications  Current Outpatient Medications   Medication    ACCU-CHEK GUIDE test strip    blood glucose monitoring (SOFTCLIX) lancets    Semaglutide, 2 MG/DOSE, (OZEMPIC) 8 MG/3ML pen    tranexamic acid (LYSTEDA) 650 MG tablet    STATIN NOT PRESCRIBED (INTENTIONAL)    tirzepatide (MOUNJARO) 2.5 MG/0.5ML pen    tirzepatide (MOUNJARO) 5 MG/0.5ML pen     No current facility-administered medications for this visit.       Allergies     Allergies   Allergen Reactions    No Known Drug Allergy        Social History  Social History     Tobacco Use    Smoking status: Never     Passive exposure: Never    Smokeless tobacco: Never   Vaping Use    Vaping Use: Never used   Substance Use Topics    Alcohol use: Yes     Comment: Rarely    Drug use: Never       Family History  History reviewed. No pertinent family history.    Review of Systems  Negative except for what was noted in HPI    OBJECTIVE   /66 (BP Location: Left arm, Patient Position: Chair)   Pulse 82   Ht 1.549 m (5' 1\")   Wt 93.2 kg (205 lb 8 oz)   LMP 11/08/2023 (Exact Date)   BMI 38.83 kg/m    BMI: Body mass index is 38.83 kg/m .  General:  Alert, no distress   Head:  Normocephalic, without obvious abnormality   Lungs:  No increased work of breathing   Heart:  Extremities well perfused   Abdomen:  Soft, " non-tender, non-distended, bowel sounds normal   Pelvic: -nefg  -bladder wnl, well supported  -vagina normal without discharge  -cervix normal in appearance, no masses  -anus wnl           PROCEDURE NOTE: -- IUD Insertion & Endometrial Biopsy    Reason for Insertion: abnormal uterine bleeding    Under sterile technique, cervix was visualized with speculum and prepped with Betadine solution swab x 3. Tenaculum was placed for stability. The uterus was gently straightened and the endometrial pipelle was advanced without difficulty and a sample collected. One additional pass was made. The uterus sounded to 10.0 cm. IUD prepared for placement, and IUD inserted according to 's instructions without difficulty or significant resitance, and deployed at the fundus. The strings were visualized and trimmed to 3.0 cm from the external os. Tenaculum was removed and hemostasis noted. Speculum removed.  Patient tolerated procedure well.    IUD:  Lot # [id number]  Exp: 2026    EBL: 0  Complications: None apparent  Pathology: EMB sample was sent to pathology.  Tolerance of Procedure: Patient tolerated the procedure well.     ASSESSMENT/PLAN   Anuradha Brown is a 43 year old , here today for consultation for heavy menstrual bleeding and shortened menstrual interval. This could be due to ovulatory decline with anovulatory cycles, hyperplasia/carcinoma, or structural lesions.    # Abnormal uterine bleeding  Due to the patient's age and persistent bleeding, an EMB was collected in clinic today.   Management of AUB was discussed including non-hormonal options, hormonal options, LARCs, and surgical options including ablation/hysterectomy.    The patient would like to stay away from OCPs/nexplanon, as those didn't work before and she's worried about potential weight gain with OCPs. The patient is interested in the IUD and endometrial ablation. After discussion, the patient opted for an IUD with TXA for breakthrough  heavy bleeding. If this doesn't work, she'd like to discuss an endometrial ablation.    - EMB today  - Schedule U/S at most convenient location  - IUD placement today  - TXA rx provided for any HMB.    RTC in 3-4 months if bleeding has not stopped, PRN if it has    Patient staffed with Dr. Brannon Valenzuela MD  Obstetrics and Gynecology, PGY-1  12/05/23      Appreciate note by Dr. Valenzuela. Patient has been seen and examined by me with the resident, agree with above note. I was present for above procedure.     Bisi South MD

## 2023-12-05 NOTE — TELEPHONE ENCOUNTER
I did call and help to get Kojo scheduled for her US at [facility] [address] on 12-18-23 @ 9:10 am and to arrive at 0855. Patient was instructed to come with a full bladder as well     All of this information was passed on to Kojo and all questions were answered.

## 2023-12-05 NOTE — TELEPHONE ENCOUNTER
----- Message from Bisi South MD sent at 12/5/2023  3:33 PM CST -----  Regarding: help schedule US  Can you help this patient get US scheduled at a different location? Or at least fax order to her preferred place?     Thanks!

## 2023-12-18 ENCOUNTER — HOSPITAL ENCOUNTER (OUTPATIENT)
Dept: ULTRASOUND IMAGING | Facility: CLINIC | Age: 43
Discharge: HOME | End: 2023-12-18
Attending: OBSTETRICS & GYNECOLOGY | Admitting: OBSTETRICS & GYNECOLOGY
Payer: COMMERCIAL

## 2023-12-18 DIAGNOSIS — N92.4 EXCESSIVE BLEEDING IN PREMENOPAUSAL PERIOD: ICD-10-CM

## 2023-12-18 PROCEDURE — 76830 TRANSVAGINAL US NON-OB: CPT

## 2023-12-20 DIAGNOSIS — N83.201 RIGHT OVARIAN CYST: Primary | ICD-10-CM

## 2023-12-29 ENCOUNTER — MYC MEDICAL ADVICE (OUTPATIENT)
Dept: OBGYN | Facility: CLINIC | Age: 43
End: 2023-12-29
Payer: COMMERCIAL

## 2024-01-02 ENCOUNTER — OFFICE VISIT (OUTPATIENT)
Dept: FAMILY MEDICINE | Facility: CLINIC | Age: 44
End: 2024-01-02
Payer: COMMERCIAL

## 2024-01-02 VITALS
RESPIRATION RATE: 16 BRPM | DIASTOLIC BLOOD PRESSURE: 60 MMHG | BODY MASS INDEX: 39.11 KG/M2 | SYSTOLIC BLOOD PRESSURE: 102 MMHG | TEMPERATURE: 97.8 F | WEIGHT: 207 LBS | OXYGEN SATURATION: 98 % | HEART RATE: 88 BPM

## 2024-01-02 DIAGNOSIS — Z11.4 SCREENING FOR HIV (HUMAN IMMUNODEFICIENCY VIRUS): ICD-10-CM

## 2024-01-02 DIAGNOSIS — J06.9 UPPER RESPIRATORY TRACT INFECTION, UNSPECIFIED TYPE: Primary | ICD-10-CM

## 2024-01-02 DIAGNOSIS — Z11.59 NEED FOR HEPATITIS C SCREENING TEST: ICD-10-CM

## 2024-01-02 DIAGNOSIS — E11.9 TYPE 2 DIABETES MELLITUS WITHOUT COMPLICATION, WITHOUT LONG-TERM CURRENT USE OF INSULIN (H): ICD-10-CM

## 2024-01-02 LAB
CHOLEST SERPL-MCNC: 206 MG/DL
CREAT UR-MCNC: 113 MG/DL
DEPRECATED S PYO AG THROAT QL EIA: NEGATIVE
FASTING STATUS PATIENT QL REPORTED: ABNORMAL
FLUAV RNA SPEC QL NAA+PROBE: NEGATIVE
FLUBV RNA RESP QL NAA+PROBE: NEGATIVE
GROUP A STREP BY PCR: NOT DETECTED
HBA1C MFR BLD: 5.8 % (ref 0–5.6)
HCV AB SERPL QL IA: NONREACTIVE
HDLC SERPL-MCNC: 45 MG/DL
HIV 1+2 AB+HIV1 P24 AG SERPL QL IA: NONREACTIVE
LDLC SERPL CALC-MCNC: 130 MG/DL
MICROALBUMIN UR-MCNC: <12 MG/L
MICROALBUMIN/CREAT UR: NORMAL MG/G{CREAT}
NONHDLC SERPL-MCNC: 161 MG/DL
RSV RNA SPEC NAA+PROBE: NEGATIVE
SARS-COV-2 RNA RESP QL NAA+PROBE: NEGATIVE
TRIGL SERPL-MCNC: 154 MG/DL

## 2024-01-02 PROCEDURE — 87389 HIV-1 AG W/HIV-1&-2 AB AG IA: CPT | Performed by: FAMILY MEDICINE

## 2024-01-02 PROCEDURE — 82043 UR ALBUMIN QUANTITATIVE: CPT | Performed by: FAMILY MEDICINE

## 2024-01-02 PROCEDURE — 99214 OFFICE O/P EST MOD 30 MIN: CPT | Performed by: FAMILY MEDICINE

## 2024-01-02 PROCEDURE — 82570 ASSAY OF URINE CREATININE: CPT | Performed by: FAMILY MEDICINE

## 2024-01-02 PROCEDURE — 87637 SARSCOV2&INF A&B&RSV AMP PRB: CPT | Performed by: FAMILY MEDICINE

## 2024-01-02 PROCEDURE — 36415 COLL VENOUS BLD VENIPUNCTURE: CPT | Performed by: FAMILY MEDICINE

## 2024-01-02 PROCEDURE — 80061 LIPID PANEL: CPT | Performed by: FAMILY MEDICINE

## 2024-01-02 PROCEDURE — 83036 HEMOGLOBIN GLYCOSYLATED A1C: CPT | Performed by: FAMILY MEDICINE

## 2024-01-02 PROCEDURE — 86803 HEPATITIS C AB TEST: CPT | Performed by: FAMILY MEDICINE

## 2024-01-02 PROCEDURE — 87651 STREP A DNA AMP PROBE: CPT | Performed by: FAMILY MEDICINE

## 2024-01-02 ASSESSMENT — PATIENT HEALTH QUESTIONNAIRE - PHQ9
SUM OF ALL RESPONSES TO PHQ QUESTIONS 1-9: 2
SUM OF ALL RESPONSES TO PHQ QUESTIONS 1-9: 2
10. IF YOU CHECKED OFF ANY PROBLEMS, HOW DIFFICULT HAVE THESE PROBLEMS MADE IT FOR YOU TO DO YOUR WORK, TAKE CARE OF THINGS AT HOME, OR GET ALONG WITH OTHER PEOPLE: NOT DIFFICULT AT ALL

## 2024-01-02 ASSESSMENT — ENCOUNTER SYMPTOMS
SWOLLEN GLANDS: 1
TROUBLE SWALLOWING: 1
SORE THROAT: 1
COUGH: 1
HEADACHES: 1

## 2024-01-02 ASSESSMENT — LIFESTYLE VARIABLES: SMOKING_STATUS: 0

## 2024-01-02 NOTE — PROGRESS NOTES
Assessment & Plan     Upper respiratory tract infection, unspecified type  Not improving  - Streptococcus A Rapid Screen w/Reflex to PCR - Clinic Collect  - Symptomatic Influenza A/B, RSV, & SARS-CoV2 PCR (COVID-19) Nose      Inocente Gutierrez MD  [facility]    Ragini Varma is a 43 year old, presenting for the following health issues:  Pharyngitis (X2 days.), Cough, and Eye Drainage      1/2/2024    12:55 PM   Additional Questions   Roomed by srud   Accompanied by n/a       Pharyngitis   This is a new problem. The current episode started 2 days ago. The problem has been gradually worsening. There has been no fever. Associated symptoms include congestion, headaches, swollen glands, trouble swallowing and cough.   Cough  This is a new problem. The current episode started more than 2 days ago. The cough is Productive of sputum. There has been no fever. Associated symptoms include headaches and sore throat. She is not a smoker. Her past medical history is significant for pneumonia.   History of Present Illness       Reason for visit:  Sore throat  Symptom onset:  1-3 days ago  Symptom intensity:  Moderate  Symptom progression:  Staying the same  Had these symptoms before:  No    She eats 0-1 servings of fruits and vegetables daily.She consumes 1 sweetened beverage(s) daily.She exercises with enough effort to increase her heart rate 30 to 60 minutes per day.  She exercises with enough effort to increase her heart rate 3 or less days per week.   She is taking medications regularly.       Review of Systems   HENT:  Positive for congestion, sore throat and trouble swallowing.    Respiratory:  Positive for cough.    Neurological:  Positive for headaches.            Objective    /60 (BP Location: Right arm, Patient Position: Sitting)   Pulse 88   Temp 97.8  F (36.6  C) (Tympanic)   Resp 16   Wt 93.9 kg (207 lb)   LMP 12/21/2023 (Exact Date)   SpO2 98%   BMI 39.11 kg/m     Body mass index is 39.11 kg/m .  Physical Exam   GENERAL: healthy, alert and no distress  HENT: normal cephalic/atraumatic, ear canals and TM's normal, nose and mouth without ulcers or lesions, rhinorrhea green, oropharynx clear, and oral mucous membranes moist  NECK: no adenopathy, no asymmetry, masses, or scars and thyroid normal to palpation  RESP: lungs clear to auscultation - no rales, rhonchi or wheezes  CV: regular rate and rhythm, normal S1 S2, no S3 or S4, no murmur, click or rub, no peripheral edema and peripheral pulses strong

## 2024-01-18 ENCOUNTER — MYC MEDICAL ADVICE (OUTPATIENT)
Dept: FAMILY MEDICINE | Facility: CLINIC | Age: 44
End: 2024-01-18
Payer: COMMERCIAL

## 2024-01-18 ENCOUNTER — TELEPHONE (OUTPATIENT)
Dept: FAMILY MEDICINE | Facility: CLINIC | Age: 44
End: 2024-01-18

## 2024-01-18 NOTE — TELEPHONE ENCOUNTER
Prior Authorization Retail Medication Request    Medication/Dose: Mounjaro 5mg/0.5mL  Diagnosis and ICD code (if different than what is on RX):    New/renewal/insurance change PA/secondary ins. PA:  Previously Tried and Failed:  Metformin  Rationale:      Insurance   Primary: [facility]  Insurance ID:

## 2024-01-20 ENCOUNTER — MYC MEDICAL ADVICE (OUTPATIENT)
Dept: FAMILY MEDICINE | Facility: CLINIC | Age: 44
End: 2024-01-20
Payer: COMMERCIAL

## 2024-01-30 ENCOUNTER — MYC MEDICAL ADVICE (OUTPATIENT)
Dept: FAMILY MEDICINE | Facility: CLINIC | Age: 44
End: 2024-01-30
Payer: COMMERCIAL

## 2024-01-30 NOTE — TELEPHONE ENCOUNTER
Prior Authorization Not Needed per Insurance    Medication: MOUNJARO 5 MG/0.5ML SC SOPN  Insurance Company: [employer] ([facility]) - Phone [phone] Fax [phone]  Expected CoPay: $    Pharmacy Filling the Rx: [facility] [place] - [address] [place] [address]  Pharmacy Notified: Yes  Patient Notified: **Instructed pharmacy to notify patient when script is ready to /ship.**    I called pharmacy to let them know that PA is not needed per insurance. Per pharmacy tech, their system is down at the moment and will process at a later time. They will call back if any issues otherwise, they will notify patient when ready for .

## 2024-02-29 ENCOUNTER — TELEPHONE (OUTPATIENT)
Dept: EDUCATION SERVICES | Facility: CLINIC | Age: 44
End: 2024-02-29
Payer: COMMERCIAL

## 2024-02-29 DIAGNOSIS — E11.9 TYPE 2 DIABETES MELLITUS WITHOUT COMPLICATION, WITHOUT LONG-TERM CURRENT USE OF INSULIN (H): Primary | ICD-10-CM

## 2024-02-29 RX ORDER — TIRZEPATIDE 10 MG/.5ML
10 INJECTION, SOLUTION SUBCUTANEOUS
Qty: 2 ML | Refills: 1 | Status: SHIPPED | OUTPATIENT
Start: 2024-03-28 | End: 2024-05-29

## 2024-02-29 RX ORDER — TIRZEPATIDE 7.5 MG/.5ML
7.5 INJECTION, SOLUTION SUBCUTANEOUS
Qty: 2 ML | Refills: 0 | Status: SHIPPED | OUTPATIENT
Start: 2024-02-29 | End: 2024-03-28

## 2024-02-29 NOTE — TELEPHONE ENCOUNTER
Patient calls and has tolerated Mounjaro 2.5 mg and 5 mg very well and would like to increase doses via titration schedule

## 2024-03-05 ENCOUNTER — OFFICE VISIT (OUTPATIENT)
Dept: OBGYN | Facility: CLINIC | Age: 44
End: 2024-03-05
Attending: OBSTETRICS & GYNECOLOGY
Payer: COMMERCIAL

## 2024-03-05 VITALS
BODY MASS INDEX: 39.46 KG/M2 | DIASTOLIC BLOOD PRESSURE: 68 MMHG | WEIGHT: 209 LBS | HEIGHT: 61 IN | SYSTOLIC BLOOD PRESSURE: 101 MMHG | HEART RATE: 71 BPM

## 2024-03-05 DIAGNOSIS — N93.9 ABNORMAL UTERINE BLEEDING (AUB): Primary | ICD-10-CM

## 2024-03-05 PROCEDURE — 99213 OFFICE O/P EST LOW 20 MIN: CPT | Performed by: OBSTETRICS & GYNECOLOGY

## 2024-03-05 PROCEDURE — 99213 OFFICE O/P EST LOW 20 MIN: CPT | Mod: GC | Performed by: OBSTETRICS & GYNECOLOGY

## 2024-03-05 NOTE — LETTER
3/5/2024       RE: Anuradha Brown  [address] [facility] [address]     Dear Colleague,    Thank you for referring your patient, Anuradha Brown, to the [facility] [place] at [facility]. Please see a copy of my visit note below.    [facility]  Gynecology Visit    Reason for Visit: AUB    HPI:    Anuradha Brown is a 43 year old , here for follow up on AUB after placement of Mirena IUD on . She reports improved volume of bleeding but very long periods. Prior to the IUD she had been experiencing very heavy periods with large blood clots and severe cramping. Since December she has had 10 to 12 day periods with very light bleeding. She requires one, maybe two menstrual products a day and she experiences very minimal cramping. The bleeding interferes with her ability to do certain things and is generally irritating but she does think it is a significant improvement from prior. She had normal biopsy at time of placement.     Hx  OB History    Para Term  AB Living   5 5 5 0 0 5   SAB IAB Ectopic Multiple Live Births   0 0 0 0 0      # Outcome Date GA Lbr Erasmo/2nd Weight Sex Delivery Anes PTL Lv   5 Term      Vag-Spont      4 Term      Vag-Spont      3 Term      Vag-Spont      2 Term      Vag-Spont      1 Term      Vag-Spont          Past Medical History:   Diagnosis Date    Other and unspecified ovarian cyst 98    ultrasound done       Past Surgical History:   Procedure Laterality Date    BRONCHOSCOPY FLEXIBLE N/A 2023    Procedure: Bronchoscopy flexible and biopsy;  Surgeon: Haylie Polo MD;  [facility]    INJECT STEROID (LOCATION) N/A 2023    Procedure: steroid injection;  Surgeon: Haylie Polo MD;  [facility]    LASER CO2 LARYNGOSCOPY N/A 2023    Procedure: carbon dioxide laser resection of stenosis, CRE balloon dilation;  Surgeon: Haylie Polo MD;  Location: [facility]          Current Outpatient Medications:     ACCU-CHEK GUIDE test strip, test TWICE DAILY, Disp: 200 strip, Rfl: 3    blood glucose monitoring (SOFTCLIX) lancets, USE TWICE DAILY Disp: 200 each, Rfl: 3    STATIN NOT PRESCRIBED (INTENTIONAL), Please choose reason not prescribed from choices below. Patient wants to wait a year., Disp: , Rfl:     [START ON 3/28/2024] tirzepatide (MOUNJARO) 10 MG/0.5ML pen, Inject 10 mg Subcutaneous every 7 days, Disp: 2 mL, Rfl: 1    tirzepatide (MOUNJARO) 7.5 MG/0.5ML pen, Inject 7.5 mg Subcutaneous every 7 days for 28 days, Disp: 2 mL, Rfl: 0    tranexamic acid (LYSTEDA) 650 MG tablet, Take 2 tablets (1,300 mg) by mouth 3 times daily for 5 days on the heaviest days of your menstrual period. (Patient not taking: Reported on 1/2/2024), Disp: 30 tablet, Rfl: 3    Allergies   Allergen Reactions    No Known Drug Allergy        No family history on file.    Social History     Socioeconomic History    Marital status:      Spouse name: Not on file    Number of children: Not on file    Years of education: Not on file    Highest education level: Not on file   Occupational History    Not on file   Tobacco Use    Smoking status: Never     Passive exposure: Never    Smokeless tobacco: Never   Vaping Use    Vaping Use: Never used   Substance and Sexual Activity    Alcohol use: Yes     Comment: Rarely    Drug use: Never    Sexual activity: Yes     Partners: Male     Birth control/protection: Male Surgical   Other Topics Concern    Not on file   Social History Narrative    Not on file     Social Determinants of Health     Financial Resource Strain: Low Risk  (1/2/2024)    Financial Resource Strain     Within the past 12 months, have you or your family members you live with been unable to get utilities (heat, electricity) when it was really needed?: No   Food Insecurity: Low Risk  (1/2/2024)    Food Insecurity     Within the past 12 months, did you worry that your food would run out before you  "got money to buy more?: No     Within the past 12 months, did the food you bought just not last and you didn t have money to get more?: No   Transportation Needs: Low Risk  (2024)    Transportation Needs     Within the past 12 months, has lack of transportation kept you from medical appointments, getting your medicines, non-medical meetings or appointments, work, or from getting things that you need?: No   Physical Activity: Not on file   Stress: Not on file   Social Connections: Not on file   Interpersonal Safety: Low Risk  (2023)    Interpersonal Safety     Do you feel physically and emotionally safe where you currently live?: Yes     Within the past 12 months, have you been hit, slapped, kicked or otherwise physically hurt by someone?: No     Within the past 12 months, have you been humiliated or emotionally abused in other ways by your partner or ex-partner?: No   Housing Stability: Low Risk  (2024)    Housing Stability     Do you have housing? : Yes     Are you worried about losing your housing?: No       ROS: ROS negative except as noted in HPI    Physical Exam  /68   Pulse 71   Ht 1.549 m (5' 1\")   Wt 94.8 kg (209 lb)   LMP 2024 (Approximate)   BMI 39.49 kg/m    Gen: Well-appearing, NAD    Assessment/Plan:  Anuradha Brown is a 43 year old  female here for follow up on AUB.     AUB  Explained that the fact that bleeding has significantly improved from prior is promising and that over the next few months bleeding will likely decrease even further and hormonal IUDs can take up to 6 months to demonstrate maximum benefit. Discussed scheduling ablation now vs waiting another 3 months to see if bleeding profile will further improve with just the mirena. Kojo will give the mirena another few months. Recommended that she try taking the TXA if she has another episode of prolonged bleeding.    Will consider cancelling repeat US pending insurance coverage. Imaging overall not " concerning. Would recommend repeat within 1 year if able.     Return to clinic in 3 months if continuing to have intolerable bleeding    Staffed with Dr. Brannon Cheney, PGY-1    Appreciate note by Dr. Cheney. Patient has been seen and examined by me with the resident, agree with above note.     Bisi South MD

## 2024-03-05 NOTE — PROGRESS NOTES
[facility]  Gynecology Visit    Reason for Visit: AUB    HPI:    Anuradha Brown is a 43 year old , here for follow up on AUB after placement of Mirena IUD on . She reports improved volume of bleeding but very long periods. Prior to the IUD she had been experiencing very heavy periods with large blood clots and severe cramping. Since December she has had 10 to 12 day periods with very light bleeding. She requires one, maybe two menstrual products a day and she experiences very minimal cramping. The bleeding interferes with her ability to do certain things and is generally irritating but she does think it is a significant improvement from prior. She had normal biopsy at time of placement.     Hx  OB History    Para Term  AB Living   5 5 5 0 0 5   SAB IAB Ectopic Multiple Live Births   0 0 0 0 0      # Outcome Date GA Lbr Erasmo/2nd Weight Sex Delivery Anes PTL Lv   5 Term      Vag-Spont      4 Term      Vag-Spont      3 Term      Vag-Spont      2 Term      Vag-Spont      1 Term      Vag-Spont          Past Medical History:   Diagnosis Date    Other and unspecified ovarian cyst 98    ultrasound done       Past Surgical History:   Procedure Laterality Date    BRONCHOSCOPY FLEXIBLE N/A 2023    Procedure: Bronchoscopy flexible and biopsy;  Surgeon: Haylie Polo MD;  Location: [facility]    INJECT STEROID (LOCATION) N/A 2023    Procedure: steroid injection;  Surgeon: Haylie Polo MD;  Location: [facility] OR    LASER CO2 LARYNGOSCOPY N/A 2023    Procedure: carbon dioxide laser resection of stenosis, CRE balloon dilation;  Surgeon: Haylie Polo MD;  Location: [facility] OR         Current Outpatient Medications:     ACCU-CHEK GUIDE test strip, test TWICE DAILY, Disp: 200 strip, Rfl: 3    blood glucose monitoring (SOFTCLIX) lancets, USE TWICE DAILY Disp: 200 each, Rfl: 3    STATIN NOT PRESCRIBED (INTENTIONAL), Please choose reason not prescribed from choices below. Patient wants to wait a  year., Disp: , Rfl:     [START ON 3/28/2024] tirzepatide (MOUNJARO) 10 MG/0.5ML pen, Inject 10 mg Subcutaneous every 7 days, Disp: 2 mL, Rfl: 1    tirzepatide (MOUNJARO) 7.5 MG/0.5ML pen, Inject 7.5 mg Subcutaneous every 7 days for 28 days, Disp: 2 mL, Rfl: 0    tranexamic acid (LYSTEDA) 650 MG tablet, Take 2 tablets (1,300 mg) by mouth 3 times daily for 5 days on the heaviest days of your menstrual period. (Patient not taking: Reported on 1/2/2024), Disp: 30 tablet, Rfl: 3    Allergies   Allergen Reactions    No Known Drug Allergy        No family history on file.    Social History     Socioeconomic History    Marital status:      Spouse name: Not on file    Number of children: Not on file    Years of education: Not on file    Highest education level: Not on file   Occupational History    Not on file   Tobacco Use    Smoking status: Never     Passive exposure: Never    Smokeless tobacco: Never   Vaping Use    Vaping Use: Never used   Substance and Sexual Activity    Alcohol use: Yes     Comment: Rarely    Drug use: Never    Sexual activity: Yes     Partners: Male     Birth control/protection: Male Surgical   Other Topics Concern    Not on file   Social History Narrative    Not on file     Social Determinants of Health     Financial Resource Strain: Low Risk  (1/2/2024)    Financial Resource Strain     Within the past 12 months, have you or your family members you live with been unable to get utilities (heat, electricity) when it was really needed?: No   Food Insecurity: Low Risk  (1/2/2024)    Food Insecurity     Within the past 12 months, did you worry that your food would run out before you got money to buy more?: No     Within the past 12 months, did the food you bought just not last and you didn t have money to get more?: No   Transportation Needs: Low Risk  (1/2/2024)    Transportation Needs     Within the past 12 months, has lack of transportation kept you from medical appointments, getting your  "medicines, non-medical meetings or appointments, work, or from getting things that you need?: No   Physical Activity: Not on file   Stress: Not on file   Social Connections: Not on file   Interpersonal Safety: Low Risk  (2023)    Interpersonal Safety     Do you feel physically and emotionally safe where you currently live?: Yes     Within the past 12 months, have you been hit, slapped, kicked or otherwise physically hurt by someone?: No     Within the past 12 months, have you been humiliated or emotionally abused in other ways by your partner or ex-partner?: No   Housing Stability: Low Risk  (2024)    Housing Stability     Do you have housing? : Yes     Are you worried about losing your housing?: No       ROS: ROS negative except as noted in HPI    Physical Exam  /68   Pulse 71   Ht 1.549 m (5' 1\")   Wt 94.8 kg (209 lb)   LMP 2024 (Approximate)   BMI 39.49 kg/m    Gen: Well-appearing, NAD    Assessment/Plan:  Anuradha Brown is a 43 year old  female here for follow up on AUB.     AUB  Explained that the fact that bleeding has significantly improved from prior is promising and that over the next few months bleeding will likely decrease even further and hormonal IUDs can take up to 6 months to demonstrate maximum benefit. Discussed scheduling ablation now vs waiting another 3 months to see if bleeding profile will further improve with just the mirena. Kojo will give the mirena another few months. Recommended that she try taking the TXA if she has another episode of prolonged bleeding.    Will consider cancelling repeat US pending insurance coverage. Imaging overall not concerning. Would recommend repeat within 1 year if able.     Return to clinic in 3 months if continuing to have intolerable bleeding    Staffed with Dr. Brannon Cheney, PGY-1    Appreciate note by Dr. Cheney. Patient has been seen and examined by me with the resident, agree with above note.     Bisi" Ebenezer South MD

## 2024-03-05 NOTE — PATIENT INSTRUCTIONS
Thank you for trusting us with your care!     If you need to contact us for questions about:  Symptoms, Scheduling & Medical Questions; Non-urgent (2-3 day response) Arelis message, Urgent (needing response today) [phone] (if after 3:30pm next day response)   Prescriptions: Please call your Pharmacy   Billing: Omar [phone] or [facility]:[phone]

## 2024-04-07 ENCOUNTER — HEALTH MAINTENANCE LETTER (OUTPATIENT)
Age: 44
End: 2024-04-07

## 2024-05-29 DIAGNOSIS — E11.9 TYPE 2 DIABETES MELLITUS WITHOUT COMPLICATION, WITHOUT LONG-TERM CURRENT USE OF INSULIN (H): ICD-10-CM

## 2024-05-29 RX ORDER — TIRZEPATIDE 10 MG/.5ML
10 INJECTION, SOLUTION SUBCUTANEOUS
Qty: 2 ML | Refills: 1 | Status: SHIPPED | OUTPATIENT
Start: 2024-05-29 | End: 2024-08-05

## 2024-07-09 ENCOUNTER — OFFICE VISIT (OUTPATIENT)
Dept: OTOLARYNGOLOGY | Facility: CLINIC | Age: 44
End: 2024-07-09
Payer: COMMERCIAL

## 2024-07-09 VITALS
HEIGHT: 61 IN | SYSTOLIC BLOOD PRESSURE: 104 MMHG | DIASTOLIC BLOOD PRESSURE: 68 MMHG | BODY MASS INDEX: 37.57 KG/M2 | WEIGHT: 199 LBS | HEART RATE: 65 BPM

## 2024-07-09 DIAGNOSIS — R49.0 DYSPHONIA: Primary | ICD-10-CM

## 2024-07-09 PROCEDURE — 99214 OFFICE O/P EST MOD 30 MIN: CPT | Mod: 25 | Performed by: OTOLARYNGOLOGY

## 2024-07-09 PROCEDURE — 31575 DIAGNOSTIC LARYNGOSCOPY: CPT | Performed by: OTOLARYNGOLOGY

## 2024-07-09 NOTE — LETTER
2024       RE: Anuradha Brown  [address] [address]     Dear Colleague,    Thank you for referring your patient, Anuradha Brown, to the [facility] [place] at [facility]. Please see a copy of my visit note below.        [facility]   at the [facility]   Otolaryngology Clinic     Patient: Anuradha Brown    MRN: [id number]    : 1980    Age/Gender: 44 year old female  Date of Service: 2024  Rendering Provider:   Haylie Polo MD     Chief Complaint   Subglottic stenosis  Dyspnea  S/p CO2 laser, CRE balloon dilation, kenalog injection 23  Interval History   HISTORY OF PRESENT ILLNESS: Ms. Brown is a 42 year old female is being followed for dyspnea. She was initially seen on 3/28/23. Please refer to this note for full history.        Today, she presents for follow up. she reports:  - denies     PAST MEDICAL HISTORY:   Past Medical History:   Diagnosis Date     Other and unspecified ovarian cyst 98    ultrasound done       PAST SURGICAL HISTORY:   Past Surgical History:   Procedure Laterality Date     BRONCHOSCOPY FLEXIBLE N/A 2023    Procedure: Bronchoscopy flexible and biopsy;  Surgeon: Haylie Polo MD;  Location: [facility]     INJECT STEROID (LOCATION) N/A 2023    Procedure: steroid injection;  Surgeon: Haylie Polo MD;  Location[facility]     LASER CO2 LARYNGOSCOPY N/A 2023    Procedure: carbon dioxide laser resection of stenosis, CRE balloon dilation;  Surgeon: Haylie Polo MD;  Location: [facility]        ALLERGIES: No known drug allergy    SOCIAL HISTORY:    Social History     Socioeconomic History     Marital status:      Spouse name: Not on file     Number of children: Not on file     Years of education: Not on file     Highest education level: Not on file   Occupational History     Not on file   Tobacco Use     Smoking status: Never     Passive  exposure: Never     Smokeless tobacco: Never   Vaping Use     Vaping status: Never Used   Substance and Sexual Activity     Alcohol use: Yes     Comment: Rarely     Drug use: Never     Sexual activity: Yes     Partners: Male     Birth control/protection: Male Surgical   Other Topics Concern     Not on file   Social History Narrative     Not on file     Social Determinants of Health     Financial Resource Strain: Low Risk  (1/2/2024)    Financial Resource Strain      Within the past 12 months, have you or your family members you live with been unable to get utilities (heat, electricity) when it was really needed?: No   Food Insecurity: Low Risk  (1/2/2024)    Food Insecurity      Within the past 12 months, did you worry that your food would run out before you got money to buy more?: No      Within the past 12 months, did the food you bought just not last and you didn t have money to get more?: No   Transportation Needs: Low Risk  (1/2/2024)    Transportation Needs      Within the past 12 months, has lack of transportation kept you from medical appointments, getting your medicines, non-medical meetings or appointments, work, or from getting things that you need?: No   Physical Activity: Not on file   Stress: Not on file   Social Connections: Not on file   Interpersonal Safety: Low Risk  (11/16/2023)    Interpersonal Safety      Do you feel physically and emotionally safe where you currently live?: Yes      Within the past 12 months, have you been hit, slapped, kicked or otherwise physically hurt by someone?: No      Within the past 12 months, have you been humiliated or emotionally abused in other ways by your partner or ex-partner?: No   Housing Stability: Low Risk  (1/2/2024)    Housing Stability      Do you have housing? : Yes      Are you worried about losing your housing?: No         FAMILY HISTORY: No family history on file.   Non-contributory for problems with anesthesia    REVIEW OF SYSTEMS:   The patient was  asked a 14 point review of systems regarding constitutional symptoms, eye symptoms, ears, nose, mouth, throat symptoms, cardiovascular symptoms, respiratory symptoms, gastrointestinal symptoms, genitourinary symptoms, musculoskeletal symptoms, integumentary symptoms, neurological symptoms, psychiatric symptoms, endocrine symptoms, hematologic/lymphatic symptoms, and allergic/ immunologic symptoms.   The pertinent factors have been included in the HPI and below.  Patient Supplied Answers to Review of Systems      3/28/2023     1:49 PM    ENT ROS   Psychology Frequently feeling anxious   Cardiopulmonary Cough    Breathing problems    Wheezing       Physical Examination   The patient underwent a physical examination as described below. The pertinent positive and negative findings are summarized after the description of the examination.  Constitutional: The patient's developmental and nutritional status was assessed. The patient's voice quality was assessed.  Head and Face: The head and face were inspected for deformities. The sinuses were palpated. The salivary glands were palpated. Facial muscle strength was assessed bilaterally.  Eyes: Extraocular movements and primary gaze alignment were assessed.  Ears, Nose, Mouth and Throat: The ears and nose were examined for deformities. The nasal septum, mucosa, and turbinates were inspected by anterior rhinoscopy. The lips, teeth, and gums were examined for abnormalities. The oral mucosa, tongue, palate, tonsils, lateral and posterior pharynx were inspected for the presence of asymmetry or mucosal lesions.    Neck: The tracheal position was noted, and the neck mass palpated to determine if there were any asymmetries, abnormal neck masses, thyromegally, or thyroid nodules.  Respiratory: The nature of the breathing and chest expansion/symmetry was observed.  Cardiovascular: The patient was examined to determine the presence of any edema or jugular venous distension.  Abdomen:  The contour of the abdomen was noted.  Lymphatic: The patient was examined for infraclavicular lymphadenopathy.  Musculoskeletal: The patient was inspected for the presence of skeletal deformities.  Extremities: The extremities were examined for any clubbing or cyanosis.  Skin: The skin was examined for inflammatory or neoplastic conditions.  Neurologic: The patient's orientation, mood, and affect were noted. The cranial nerve  functions were examined.  Other pertinent positive and negative findings on physical examination:   OC/OP: no lesions, uvula midline, soft palate elevates symmetrically   Neck: no lesions, no TH tenderness to palpation     All other physical examination findings were within normal limits and noncontributory.    Procedures   Flexible laryngoscopy (CPT 91302)        Pre-procedure diagnosis: subglottic stenosis  Post-procedure diagnosis: same as above  Indication for procedure: Ms. Brown is a 42 year old female with see above  Procedure(s): Fiberoptic Laryngoscopy     Details of Procedure: After informed consent was obtained, the patient was seated in the examination chair.  The areas of the nasopharynx as well as the hypopharynx were anesthetized with topical 4% lidocaine with 0.25% phenylephrine atomizer.  Examination of the base of tongue was performed first.  Attention was directed to any evidence of masses in the area or evidence of leukoplakia or candidal infection.  Attention was directed to the epiglottis where its size and position was determined and its movement on phonation of the vowel  e .  The piriform sinuses were then inspected for any mass lesions or pooling of secretions.  Attention was then directed to the larynx. The vocal folds were inspected for infection or any areas of leukoplakia, for masses, polypoid degeneration, or hemorrhage.  Having done this, the arytenoids and vocal processes were inspected for erythema or evidence of granuloma formation.  The posterior  "commissure was then inspected for evidence of inflammatory changes in the mucosa and heaping up of mucosal tissue. The patient was then instructed to say the vowel  e .  Adduction of vocal folds to the midline was observed for any evidence of paresis or paralysis of the larynx or asymmetry in rotation of the larynx to the left or right. The patient was asked to breathe and the degree of abduction was noted bilaterally.  Subglottic view of the larynx was obtained for any additional mass lesions or mucosal changes.  Finally the post cricoid was examined for evidence of pooling of secretions, as well as the pharyngeal wall mucosa.   Anesthesia type: 0.25% phenylephrine     Findings:  Anatomic/physiological deviations: RNC, grade 1 30% narrowing               Right vocal process: No restriction of mobility   Left vocal process: No restriction of mobility  Glottal gap: Complete glottal closure  Supraglottic structures: Normal  Hypopharynx: Normal      Estimated Blood Loss: minimal  Complications: None  Disposition: Patient tolerated the procedure well        Review of Relevant Clinical Data   I personally reviewed:  Notes:    Radiology:    Pathology:    Procedures:    Labs:  Lab Results   Component Value Date    TSH 1.06 08/13/2021     Lab Results   Component Value Date     04/06/2023    CO2 25 04/06/2023    BUN 15.9 04/06/2023     Lab Results   Component Value Date    WBC 6.0 04/06/2023    HGB 14.3 04/06/2023    HCT 43.1 04/06/2023    MCV 89 04/06/2023     04/06/2023     No results found for: \"PT\", \"PTT\", \"INR\"  No results found for: \"DRAKE\"  No components found for: \"RHEUMATOIDFACTOR\", \"RF\"  No results found for: \"CRP\"  No components found for: \"CKTOT\", \"URICACID\"  No components found for: \"C3\", \"C4\", \"DSDNAAB\", \"NDNAABIFA\"  No results found for: \"MPOAB\"    Patient reported Quality of Life (QOL) Measures   Patient Supplied Answers To VHI Questionnaire      8/7/2023     7:52 AM   Voice Handicap Index (VHI-10) " "  My voice makes it difficult for people to hear me 2   People have difficulty understanding me in a noisy room 2   My voice difficulties restrict my personal and social life.  1   I feel left out of conversations because of my voice 1   My voice problem causes me to lose income 0   I feel as though I have to strain to produce voice 2   The clarity of my voice is unpredictable 2   My voice problem upsets me 2   My voice makes me feel handicapped 0   People ask, \"What's wrong with your voice?\" 1   VHI-10 13         Patient Supplied Answers To EAT Questionnaire      3/28/2023     2:02 PM   Eating Assessment Tool (EAT-10)   My swallowing problem has caused me to lose weight 0   My swallowing problem interferes with my ability to go out for meals 0   Swallowing liquids takes extra effort 0   Swallowing solids takes extra effort 0   Swallowing pills takes extra effort 0   Swallowing is painful 0   The pleasure of eating is affected by my swallowing 0   When I swallow food sticks in my throat 0   I cough when I eat 0   Swallowing is stressful 0   EAT-10 0         Patient Supplied Answers To CSI Questionnaire      8/7/2023     7:52 AM   Cough Severity Index (CSI)   My cough is worse when I lie down 0   My coughing problem causes me to restrict my personal and social life 0   I tend to avoid places because of my cough problem 0   I feel embarrassed because of my coughing problem 2   People ask, ''What's wrong?'' because I cough a lot 0   I run out of air when I cough 0   My coughing problem affects my voice 2   My coughing problem limits my physical activity 0   My coughing problem upsets me 3   People ask me if I am sick because I cough a lot 0   CSI Score 7         Patient Supplied Answers to Dyspnea Index Questionnaire:      3/23/2023    10:54 AM   Dyspnea Index   1. I have trouble getting air in. 1   2. I feel tightness in my throat when I am having boni breathing problem. 2   3. It takes more effort to breathe than it " used to. 3   4. Change in weather affect my breathing problem. 3   5. My breathing gets worse with stress. 2   6. I make sound/noise breathing in 1   7. I have to strain to breathe. 2   8. My shortness of breath gets worse with exercise or physical activity 4   9. My breathing problem makes me feel stressed. 2   10. My breathing problem casuses me to restrict my personal and social life. 2   Dyspnea Index Total Score 22       Impression & Plan     IMPRESSION: Ms. Brown is a 44 year old female who is being seen for the following:    Dyspnea  - due to subglottic stenosis   - no intubation history   - has diabetes, on Ozempic, most recent A1C is 6.5 (12/2/22)  - denies history of autoimmune disease  - CT chest 1/30/23 normal  - PFTs 1/27/23 is abnormal, stridor heard on exam  - denies reflux   - BMI is 40  - patient symptoms with shortness of breath with exertion and prolonged talking, one severe episode woke her from sleep and resulted in ER visit, on steroid inhalers but doesn't help  - scope shows grade 3 80% stenosis  - discussed etiology of trauma (intubation), autoimmune, diabetes or idiopathic. In this case this is most likely idiopathic given no prior intubation, but also has component of diabetes, need to rule out autoimmune issues  - discussed treatment with observation, conservative management with oral abx/steroids/reflux precautions, steroid injections, endoscopic procedures, open resection and bypass procedure with tracheotomy  - patient elects to proceed with endoscopic surgery  - s/p CO2 laser, CRE balloon dilation, kenalog injection 4/19/23  - symptoms 5/18/2023 are improved breathing with average peak flow of 300 and max of 350, peak flow today is 350, has mucus sensation with need to cough and throat clear, hoarse voice with prolonged talking, walked up 3 flights of stairs yesterday  - scope shows grade 1 5% narrowing, mucus in subglottic space  - given mucus sensation, recommend nebulizer or  Mucinex, has daughter's nebulizer  - recommend voice therapy  - discussed observation with increased physical activity/weight management and close monitoring of diabetes versus conservative management with oral abx/steroids/reflux precautions versus steroid injections  - patient elects observation   - symptoms 9/21/2023 are stable, peak flow is 350, still has phlegm, still working on glucose, not checking too often  - scope shows stable subglottic opening, grade 1, 5% narrowing, mucus   - symptoms 7/9/2024 are stable, peak flow is still 350, not noticing more difficulty  - scope shows grade 1, 30% narrowing  - discussed options of observation, steroid inhaler, steroid injection  - she will think about her options and let me know   - has a nebulizer through her daughters  Plan  - use the nebulizer for the mucus plugs  - increased physical activity  - close monitoring of diabetes  - peak flow meter  - will let me know about steroid inhaler vs steroid injections    RETURN VISIT: 3-4 months    Haylie Polo MD    Laryngology    [facility]  Department of  Otolaryngology - Head and Neck Surgery  Cl[place] & [place] [address]  Appointment line: [phone]  Fax: [phone]  [email or web address]       Again, thank you for allowing me to participate in the care of your patient.      Sincerely,    Haylie Polo MD

## 2024-07-09 NOTE — PATIENT INSTRUCTIONS
1.  You were seen in the ENT Clinic today by . If you have any questions or concerns after your appointment, please call [phone]. Press option #1 for scheduling related needs. Press option #3 for Nurse advice.    2. Plan is to return to clinic 3-4 months      Heather Jo LPN  [phone]  [facility] - Otolaryngology

## 2024-07-09 NOTE — PROGRESS NOTES
[facility]   at the [facility]   Otolaryngology Clinic     Patient: Anuradha Brown    MRN: [id number]    : 1980    Age/Gender: 44 year old female  Date of Service: 2024  Rendering Provider:   Haylie Polo MD     Chief Complaint   Subglottic stenosis  Dyspnea  S/p CO2 laser, CRE balloon dilation, kenalog injection 23  Interval History   HISTORY OF PRESENT ILLNESS: Ms. Brown is a 42 year old female is being followed for dyspnea. She was initially seen on 3/28/23. Please refer to this note for full history.        Today, she presents for follow up. she reports:  - denies     PAST MEDICAL HISTORY:   Past Medical History:   Diagnosis Date    Other and unspecified ovarian cyst 98    ultrasound done       PAST SURGICAL HISTORY:   Past Surgical History:   Procedure Laterality Date    BRONCHOSCOPY FLEXIBLE N/A 2023    Procedure: Bronchoscopy flexible and biopsy;  Surgeon: Haylie Polo MD;  Location: [facility] OR    INJECT STEROID (LOCATION) N/A 2023    Procedure: steroid injection;  Surgeon: Haylie Polo MD;  Location: [facility] OR    LASER CO2 LARYNGOSCOPY N/A 2023    Procedure: carbon dioxide laser resection of stenosis, CRE balloon dilation;  Surgeon: Haylie Polo MD;  Location: [facility] OR        ALLERGIES: No known drug allergy    SOCIAL HISTORY:    Social History     Socioeconomic History    Marital status:      Spouse name: Not on file    Number of children: Not on file    Years of education: Not on file    Highest education level: Not on file   Occupational History    Not on file   Tobacco Use    Smoking status: Never     Passive exposure: Never    Smokeless tobacco: Never   Vaping Use    Vaping status: Never Used   Substance and Sexual Activity    Alcohol use: Yes     Comment: Rarely    Drug use: Never    Sexual activity: Yes     Partners: Male     Birth control/protection: Male Surgical   Other Topics Concern    Not on file   Social History Narrative    Not  on file     Social Determinants of Health     Financial Resource Strain: Low Risk  (1/2/2024)    Financial Resource Strain     Within the past 12 months, have you or your family members you live with been unable to get utilities (heat, electricity) when it was really needed?: No   Food Insecurity: Low Risk  (1/2/2024)    Food Insecurity     Within the past 12 months, did you worry that your food would run out before you got money to buy more?: No     Within the past 12 months, did the food you bought just not last and you didn t have money to get more?: No   Transportation Needs: Low Risk  (1/2/2024)    Transportation Needs     Within the past 12 months, has lack of transportation kept you from medical appointments, getting your medicines, non-medical meetings or appointments, work, or from getting things that you need?: No   Physical Activity: Not on file   Stress: Not on file   Social Connections: Not on file   Interpersonal Safety: Low Risk  (11/16/2023)    Interpersonal Safety     Do you feel physically and emotionally safe where you currently live?: Yes     Within the past 12 months, have you been hit, slapped, kicked or otherwise physically hurt by someone?: No     Within the past 12 months, have you been humiliated or emotionally abused in other ways by your partner or ex-partner?: No   Housing Stability: Low Risk  (1/2/2024)    Housing Stability     Do you have housing? : Yes     Are you worried about losing your housing?: No         FAMILY HISTORY: No family history on file.   Non-contributory for problems with anesthesia    REVIEW OF SYSTEMS:   The patient was asked a 14 point review of systems regarding constitutional symptoms, eye symptoms, ears, nose, mouth, throat symptoms, cardiovascular symptoms, respiratory symptoms, gastrointestinal symptoms, genitourinary symptoms, musculoskeletal symptoms, integumentary symptoms, neurological symptoms, psychiatric symptoms, endocrine symptoms,  hematologic/lymphatic symptoms, and allergic/ immunologic symptoms.   The pertinent factors have been included in the HPI and below.  Patient Supplied Answers to Review of Systems      3/28/2023     1:49 PM   [facility] ROS   Psychology Frequently feeling anxious   Cardiopulmonary Cough    Breathing problems    Wheezing       Physical Examination   The patient underwent a physical examination as described below. The pertinent positive and negative findings are summarized after the description of the examination.  Constitutional: The patient's developmental and nutritional status was assessed. The patient's voice quality was assessed.  Head and Face: The head and face were inspected for deformities. The sinuses were palpated. The salivary glands were palpated. Facial muscle strength was assessed bilaterally.  Eyes: Extraocular movements and primary gaze alignment were assessed.  Ears, Nose, Mouth and Throat: The ears and nose were examined for deformities. The nasal septum, mucosa, and turbinates were inspected by anterior rhinoscopy. The lips, teeth, and gums were examined for abnormalities. The oral mucosa, tongue, palate, tonsils, lateral and posterior pharynx were inspected for the presence of asymmetry or mucosal lesions.    Neck: The tracheal position was noted, and the neck mass palpated to determine if there were any asymmetries, abnormal neck masses, thyromegally, or thyroid nodules.  Respiratory: The nature of the breathing and chest expansion/symmetry was observed.  Cardiovascular: The patient was examined to determine the presence of any edema or jugular venous distension.  Abdomen: The contour of the abdomen was noted.  Lymphatic: The patient was examined for infraclavicular lymphadenopathy.  Musculoskeletal: The patient was inspected for the presence of skeletal deformities.  Extremities: The extremities were examined for any clubbing or cyanosis.  Skin: The skin was examined for inflammatory or neoplastic  conditions.  Neurologic: The patient's orientation, mood, and affect were noted. The cranial nerve  functions were examined.  Other pertinent positive and negative findings on physical examination:   OC/OP: no lesions, uvula midline, soft palate elevates symmetrically   Neck: no lesions, no TH tenderness to palpation     All other physical examination findings were within normal limits and noncontributory.    Procedures   Flexible laryngoscopy (CPT 89435)        Pre-procedure diagnosis: subglottic stenosis  Post-procedure diagnosis: same as above  Indication for procedure: Ms. Brown is a 42 year old female with see above  Procedure(s): Fiberoptic Laryngoscopy     Details of Procedure: After informed consent was obtained, the patient was seated in the examination chair.  The areas of the nasopharynx as well as the hypopharynx were anesthetized with topical 4% lidocaine with 0.25% phenylephrine atomizer.  Examination of the base of tongue was performed first.  Attention was directed to any evidence of masses in the area or evidence of leukoplakia or candidal infection.  Attention was directed to the epiglottis where its size and position was determined and its movement on phonation of the vowel  e .  The piriform sinuses were then inspected for any mass lesions or pooling of secretions.  Attention was then directed to the larynx. The vocal folds were inspected for infection or any areas of leukoplakia, for masses, polypoid degeneration, or hemorrhage.  Having done this, the arytenoids and vocal processes were inspected for erythema or evidence of granuloma formation.  The posterior commissure was then inspected for evidence of inflammatory changes in the mucosa and heaping up of mucosal tissue. The patient was then instructed to say the vowel  e .  Adduction of vocal folds to the midline was observed for any evidence of paresis or paralysis of the larynx or asymmetry in rotation of the larynx to the left or right.  "The patient was asked to breathe and the degree of abduction was noted bilaterally.  Subglottic view of the larynx was obtained for any additional mass lesions or mucosal changes.  Finally the post cricoid was examined for evidence of pooling of secretions, as well as the pharyngeal wall mucosa.   Anesthesia type: 0.25% phenylephrine     Findings:  Anatomic/physiological deviations: RNC, grade 1 30% narrowing               Right vocal process: No restriction of mobility   Left vocal process: No restriction of mobility  Glottal gap: Complete glottal closure  Supraglottic structures: Normal  Hypopharynx: Normal      Estimated Blood Loss: minimal  Complications: None  Disposition: Patient tolerated the procedure well        Review of Relevant Clinical Data   I personally reviewed:  Notes:    Radiology:    Pathology:    Procedures:    Labs:  Lab Results   Component Value Date    TSH 1.06 08/13/2021     Lab Results   Component Value Date     04/06/2023    CO2 25 04/06/2023    BUN 15.9 04/06/2023     Lab Results   Component Value Date    WBC 6.0 04/06/2023    HGB 14.3 04/06/2023    HCT 43.1 04/06/2023    MCV 89 04/06/2023     04/06/2023     No results found for: \"PT\", \"PTT\", \"INR\"  No results found for: \"DRAKE\"  No components found for: \"RHEUMATOIDFACTOR\", \"RF\"  No results found for: \"CRP\"  No components found for: \"CKTOT\", \"URICACID\"  No components found for: \"C3\", \"C4\", \"DSDNAAB\", \"NDNAABIFA\"  No results found for: \"MPOAB\"    Patient reported Quality of Life (QOL) Measures   Patient Supplied Answers To VHI Questionnaire      8/7/2023     7:52 AM   Voice Handicap Index (VHI-10)   My voice makes it difficult for people to hear me 2   People have difficulty understanding me in a noisy room 2   My voice difficulties restrict my personal and social life.  1   I feel left out of conversations because of my voice 1   My voice problem causes me to lose income 0   I feel as though I have to strain to produce voice 2 " "  The clarity of my voice is unpredictable 2   My voice problem upsets me 2   My voice makes me feel handicapped 0   People ask, \"What's wrong with your voice?\" 1   VHI-10 13         Patient Supplied Answers To EAT Questionnaire      3/28/2023     2:02 PM   Eating Assessment Tool (EAT-10)   My swallowing problem has caused me to lose weight 0   My swallowing problem interferes with my ability to go out for meals 0   Swallowing liquids takes extra effort 0   Swallowing solids takes extra effort 0   Swallowing pills takes extra effort 0   Swallowing is painful 0   The pleasure of eating is affected by my swallowing 0   When I swallow food sticks in my throat 0   I cough when I eat 0   Swallowing is stressful 0   EAT-10 0         Patient Supplied Answers To CSI Questionnaire      8/7/2023     7:52 AM   Cough Severity Index (CSI)   My cough is worse when I lie down 0   My coughing problem causes me to restrict my personal and social life 0   I tend to avoid places because of my cough problem 0   I feel embarrassed because of my coughing problem 2   People ask, ''What's wrong?'' because I cough a lot 0   I run out of air when I cough 0   My coughing problem affects my voice 2   My coughing problem limits my physical activity 0   My coughing problem upsets me 3   People ask me if I am sick because I cough a lot 0   CSI Score 7         Patient Supplied Answers to Dyspnea Index Questionnaire:      3/23/2023    10:54 AM   Dyspnea Index   1. I have trouble getting air in. 1   2. I feel tightness in my throat when I am having boni breathing problem. 2   3. It takes more effort to breathe than it used to. 3   4. Change in weather affect my breathing problem. 3   5. My breathing gets worse with stress. 2   6. I make sound/noise breathing in 1   7. I have to strain to breathe. 2   8. My shortness of breath gets worse with exercise or physical activity 4   9. My breathing problem makes me feel stressed. 2   10. My breathing problem " casuses me to restrict my personal and social life. 2   Dyspnea Index Total Score 22       Impression & Plan     IMPRESSION: Ms. Brown is a 44 year old female who is being seen for the following:    Dyspnea  - due to subglottic stenosis   - no intubation history   - has diabetes, on Ozempic, most recent A1C is 6.5 (12/2/22)  - denies history of autoimmune disease  - CT chest 1/30/23 normal  - PFTs 1/27/23 is abnormal, stridor heard on exam  - denies reflux   - BMI is 40  - patient symptoms with shortness of breath with exertion and prolonged talking, one severe episode woke her from sleep and resulted in ER visit, on steroid inhalers but doesn't help  - scope shows grade 3 80% stenosis  - discussed etiology of trauma (intubation), autoimmune, diabetes or idiopathic. In this case this is most likely idiopathic given no prior intubation, but also has component of diabetes, need to rule out autoimmune issues  - discussed treatment with observation, conservative management with oral abx/steroids/reflux precautions, steroid injections, endoscopic procedures, open resection and bypass procedure with tracheotomy  - patient elects to proceed with endoscopic surgery  - s/p CO2 laser, CRE balloon dilation, kenalog injection 4/19/23  - symptoms 5/18/2023 are improved breathing with average peak flow of 300 and max of 350, peak flow today is 350, has mucus sensation with need to cough and throat clear, hoarse voice with prolonged talking, walked up 3 flights of stairs yesterday  - scope shows grade 1 5% narrowing, mucus in subglottic space  - given mucus sensation, recommend nebulizer or Mucinex, has daughter's nebulizer  - recommend voice therapy  - discussed observation with increased physical activity/weight management and close monitoring of diabetes versus conservative management with oral abx/steroids/reflux precautions versus steroid injections  - patient elects observation   - symptoms 9/21/2023 are stable, peak flow  is 350, still has phlegm, still working on glucose, not checking too often  - scope shows stable subglottic opening, grade 1, 5% narrowing, mucus   - symptoms 7/9/2024 are stable, peak flow is still 350, not noticing more difficulty  - scope shows grade 1, 30% narrowing  - discussed options of observation, steroid inhaler, steroid injection  - she will think about her options and let me know   - has a nebulizer through her daughters  Plan  - use the nebulizer for the mucus plugs  - increased physical activity  - close monitoring of diabetes  - peak flow meter  - will let me know about steroid inhaler vs steroid injections    RETURN VISIT: 3-4 months    Haylie Polo MD    Laryngology    [facility]  Department of  Otolaryngology - Head and Neck Surgery  Clinics & Surgery Center  [address] [place] [address]  Appointment line: [phone]  Fax: [phone]  [email or web address]

## 2024-08-03 DIAGNOSIS — E11.9 TYPE 2 DIABETES MELLITUS WITHOUT COMPLICATION, WITHOUT LONG-TERM CURRENT USE OF INSULIN (H): ICD-10-CM

## 2024-08-05 ENCOUNTER — TRANSFERRED RECORDS (OUTPATIENT)
Dept: HEALTH INFORMATION MANAGEMENT | Facility: CLINIC | Age: 44
End: 2024-08-05
Payer: COMMERCIAL

## 2024-08-05 LAB — RETINOPATHY: NEGATIVE

## 2024-08-05 RX ORDER — TIRZEPATIDE 10 MG/.5ML
10 INJECTION, SOLUTION SUBCUTANEOUS
Qty: 2 ML | Refills: 1 | Status: SHIPPED | OUTPATIENT
Start: 2024-08-05

## 2024-08-09 ENCOUNTER — MYC MEDICAL ADVICE (OUTPATIENT)
Dept: EDUCATION SERVICES | Facility: CLINIC | Age: 44
End: 2024-08-09
Payer: COMMERCIAL

## 2024-08-09 DIAGNOSIS — E11.9 TYPE 2 DIABETES MELLITUS WITHOUT COMPLICATION, WITHOUT LONG-TERM CURRENT USE OF INSULIN (H): Primary | ICD-10-CM

## 2024-08-25 ENCOUNTER — HEALTH MAINTENANCE LETTER (OUTPATIENT)
Age: 44
End: 2024-08-25

## 2024-10-28 ENCOUNTER — PATIENT OUTREACH (OUTPATIENT)
Dept: FAMILY MEDICINE | Facility: CLINIC | Age: 44
End: 2024-10-28
Payer: COMMERCIAL

## 2024-10-28 PROBLEM — Z12.4 CERVICAL CANCER SCREENING: Status: ACTIVE | Noted: 2023-11-29

## 2024-11-04 NOTE — PROGRESS NOTES
[facility]   at the [facility]   Otolaryngology Clinic     Patient: Anuradha Brown    MRN: [id number]    : 1980    Age/Gender: 44 year old female  Date of Service: 2024  Rendering Provider:   Haylie Polo MD         Impression & Plan     IMPRESSION: Ms. Brown is a 44 year old female who is being seen for the following:    Dyspnea  - due to subglottic stenosis   - no intubation history   - has diabetes, on Ozempic, most recent A1C is 6.5 (22)  - denies history of autoimmune disease  - CT chest 23 normal  - PFTs 23 is abnormal, stridor heard on exam  - denies reflux   - BMI is 40  - patient symptoms with shortness of breath with exertion and prolonged talking, one severe episode woke her from sleep and resulted in ER visit, on steroid inhalers but doesn't help  - scope shows grade 3 80% stenosis  - discussed etiology of trauma (intubation), autoimmune, diabetes or idiopathic. In this case this is most likely idiopathic given no prior intubation, but also has component of diabetes, need to rule out autoimmune issues  - discussed treatment with observation, conservative management with oral abx/steroids/reflux precautions, steroid injections, endoscopic procedures, open resection and bypass procedure with tracheotomy  - patient elects to proceed with endoscopic surgery  - s/p CO2 laser, CRE balloon dilation, kenalog injection 23  - symptoms 2023 are improved breathing with average peak flow of 300 and max of 350, peak flow today is 350, has mucus sensation with need to cough and throat clear, hoarse voice with prolonged talking, walked up 3 flights of stairs yesterday  - scope shows grade 1 5% narrowing, mucus in subglottic space  - given mucus sensation, recommend nebulizer or Mucinex, has daughter's nebulizer  - recommend voice therapy  - discussed observation with increased physical activity/weight management and close monitoring of diabetes versus  "conservative management with oral abx/steroids/reflux precautions versus steroid injections  - patient elects observation   - symptoms 9/21/2023 are stable, peak flow is 350, still has phlegm, still working on glucose, not checking too often  - scope shows stable subglottic opening, grade 1, 5% narrowing, mucus   - symptoms 7/9/2024 are stable, peak flow is still 350, not noticing more difficulty  - scope shows grade 1, 30% narrowing  - discussed options of observation, steroid inhaler, steroid injection  - she will think about her options and let me know   - has a nebulizer through her daughters  - symptoms 11/4/2024 are worse. Difficulty breathing with stairs and some talking, feels more mucus. Peak flow between 250 and 300.   - has been using the nebulizer  - scope shows grade 1, 40% narrowing  - had patient work with therapist to rule out vocal fold dysfunction, pt does not have vocal fold syfunction, but has sig strider  - discussed would recommend endoscopic surgery at this given severity of symptoms  - risks include but not limited to bleeding, infection, reaction to the anesthesia, damage to adjacent structures lips, teeth, tongue, larynx, pharynx, \"numb tongue, altered taste, TMJ syndrome, dental injury\", hoarseness, breathing, or swallowing problems  - given a laser can be used risk of an airway fire is also possible  - risk from the dilation include tracheal tear   U  - use the nebulizer for the mucus plugs  - schedule surgery    RETURN VISIT: after surgery    Chief Complaint   Subglottic stenosis  Dyspnea  S/p CO2 laser, CRE balloon dilation, kenalog injection 4/19/23  Interval History   HISTORY OF PRESENT ILLNESS: Ms. Brown is a 42 year old female is being followed for dyspnea. She was initially seen on 3/28/23. Please refer to this note for full history.     Today, she presents for follow up. she reports:  - breathing is worsening  - more coughing and throat clearing  - feels symptoms prior to surgery " returning    Dysphonia:  denies    Dysphagia:  denies    Dyspnea:  Reports    Coughing / Throat-clearing:  Reports    GERD/LPRD:  Denies    PAST MEDICAL HISTORY:   Past Medical History:   Diagnosis Date    Other and unspecified ovarian cyst 9/23/98    ultrasound done       PAST SURGICAL HISTORY:   Past Surgical History:   Procedure Laterality Date    BRONCHOSCOPY FLEXIBLE N/A 4/19/2023    Procedure: Bronchoscopy flexible and biopsy;  Surgeon: Haylie Polo MD;  Location: [facility] OR    INJECT STEROID (LOCATION) N/A 4/19/2023    Procedure: steroid injection;  Surgeon: Haylie Polo MD;  Location: [facility] OR    LASER CO2 LARYNGOSCOPY N/A 4/19/2023    Procedure: carbon dioxide laser resection of stenosis, CRE balloon dilation;  Surgeon: Haylie Polo MD;  Location: [facility] OR       CURRENT MEDICATIONS:    ALLERGIES: No known drug allergy    SOCIAL HISTORY:    Social History     Socioeconomic History    Marital status:      Spouse name: Not on file    Number of children: Not on file    Years of education: Not on file    Highest education level: Not on file   Occupational History    Not on file   Tobacco Use    Smoking status: Never     Passive exposure: Never    Smokeless tobacco: Never   Vaping Use    Vaping status: Never Used   Substance and Sexual Activity    Alcohol use: Yes     Comment: Rarely    Drug use: Never    Sexual activity: Yes     Partners: Male     Birth control/protection: Male Surgical   Other Topics Concern    Not on file   Social History Narrative    Not on file     Social Drivers of Health     Financial Resource Strain: Low Risk  (1/2/2024)    Financial Resource Strain     Within the past 12 months, have you or your family members you live with been unable to get utilities (heat, electricity) when it was really needed?: No   Food Insecurity: Low Risk  (1/2/2024)    Food Insecurity     Within the past 12 months, did you worry that your food would run out before you got money to buy more?: No     Within the  past 12 months, did the food you bought just not last and you didn t have money to get more?: No   Transportation Needs: Low Risk  (1/2/2024)    Transportation Needs     Within the past 12 months, has lack of transportation kept you from medical appointments, getting your medicines, non-medical meetings or appointments, work, or from getting things that you need?: No   Physical Activity: Not on file   Stress: Not on file   Social Connections: Not on file   Interpersonal Safety: Low Risk  (11/16/2023)    Interpersonal Safety     Do you feel physically and emotionally safe where you currently live?: Yes     Within the past 12 months, have you been hit, slapped, kicked or otherwise physically hurt by someone?: No     Within the past 12 months, have you been humiliated or emotionally abused in other ways by your partner or ex-partner?: No   Housing Stability: Low Risk  (1/2/2024)    Housing Stability     Do you have housing? : Yes     Are you worried about losing your housing?: No         FAMILY HISTORY: No family history on file.   Non-contributory for problems with anesthesia    REVIEW OF SYSTEMS:   The patient was asked a 14 point review of systems regarding constitutional symptoms, eye symptoms, ears, nose, mouth, throat symptoms, cardiovascular symptoms, respiratory symptoms, gastrointestinal symptoms, genitourinary symptoms, musculoskeletal symptoms, integumentary symptoms, neurological symptoms, psychiatric symptoms, endocrine symptoms, hematologic/lymphatic symptoms, and allergic/ immunologic symptoms.   The pertinent factors have been included in the HPI and below.  Patient Supplied Answers to Review of Systems      3/28/2023     1:49 PM    ENT ROS   Psychology Frequently feeling anxious   Cardiopulmonary Cough    Breathing problems    Wheezing       Physical Examination   The patient underwent a physical examination as described below. The pertinent positive and negative findings are summarized after the  description of the examination.  Constitutional: The patient's developmental and nutritional status was assessed. The patient's voice quality was assessed.  Head and Face: The head and face were inspected for deformities. The sinuses were palpated. The salivary glands were palpated. Facial muscle strength was assessed bilaterally.  Eyes: Extraocular movements and primary gaze alignment were assessed.  Ears, Nose, Mouth and Throat: The ears and nose were examined for deformities. The nasal septum, mucosa, and turbinates were inspected by anterior rhinoscopy. The lips, teeth, and gums were examined for abnormalities. The oral mucosa, tongue, palate, tonsils, lateral and posterior pharynx were inspected for the presence of asymmetry or mucosal lesions.    Neck: The tracheal position was noted, and the neck mass palpated to determine if there were any asymmetries, abnormal neck masses, thyromegally, or thyroid nodules.  Respiratory: The nature of the breathing and chest expansion/symmetry was observed.  Cardiovascular: The patient was examined to determine the presence of any edema or jugular venous distension.  Abdomen: The contour of the abdomen was noted.  Lymphatic: The patient was examined for infraclavicular lymphadenopathy.  Musculoskeletal: The patient was inspected for the presence of skeletal deformities.  Extremities: The extremities were examined for any clubbing or cyanosis.  Skin: The skin was examined for inflammatory or neoplastic conditions.  Neurologic: The patient's orientation, mood, and affect were noted. The cranial nerve  functions were examined.  Other pertinent positive and negative findings on physical examination:   OC/OP: no lesions, uvula midline, soft palate elevates symmetrically   Neck: no lesions, no TH tenderness to palpation    All other physical examination findings were within normal limits and noncontributory.    Procedures   Flexible laryngoscopy (CPT 35691)        Pre-procedure  diagnosis: subglottic stenosis  Post-procedure diagnosis: same as above  Indication for procedure: Ms. Brown is a 42 year old female with see above  Procedure(s): Fiberoptic Laryngoscopy     Details of Procedure: After informed consent was obtained, the patient was seated in the examination chair.  The areas of the nasopharynx as well as the hypopharynx were anesthetized with topical 4% lidocaine with 0.25% phenylephrine atomizer.  Examination of the base of tongue was performed first.  Attention was directed to any evidence of masses in the area or evidence of leukoplakia or candidal infection.  Attention was directed to the epiglottis where its size and position was determined and its movement on phonation of the vowel  e .  The piriform sinuses were then inspected for any mass lesions or pooling of secretions.  Attention was then directed to the larynx. The vocal folds were inspected for infection or any areas of leukoplakia, for masses, polypoid degeneration, or hemorrhage.  Having done this, the arytenoids and vocal processes were inspected for erythema or evidence of granuloma formation.  The posterior commissure was then inspected for evidence of inflammatory changes in the mucosa and heaping up of mucosal tissue. The patient was then instructed to say the vowel  e .  Adduction of vocal folds to the midline was observed for any evidence of paresis or paralysis of the larynx or asymmetry in rotation of the larynx to the left or right. The patient was asked to breathe and the degree of abduction was noted bilaterally.  Subglottic view of the larynx was obtained for any additional mass lesions or mucosal changes.  Finally the post cricoid was examined for evidence of pooling of secretions, as well as the pharyngeal wall mucosa.   Anesthesia type: 0.25% phenylephrine     Findings:  Anatomic/physiological deviations: RNC, grade 1 40% narrowing               Right vocal process: No restriction of mobility   Left  "vocal process: No restriction of mobility  Glottal gap: Complete glottal closure  Supraglottic structures: Normal  Hypopharynx: Normal      Estimated Blood Loss: minimal  Complications: None  Disposition: Patient tolerated the procedure well      Review of Relevant Clinical Data   I personally reviewed:  Labs:  Lab Results   Component Value Date    TSH 1.06 08/13/2021     Lab Results   Component Value Date     04/06/2023    CO2 25 04/06/2023    BUN 15.9 04/06/2023     Lab Results   Component Value Date    WBC 6.0 04/06/2023    HGB 14.3 04/06/2023    HCT 43.1 04/06/2023    MCV 89 04/06/2023     04/06/2023     No results found for: \"PT\", \"PTT\", \"INR\"  No results found for: \"DRAKE\"  No components found for: \"RHEUMATOIDFACTOR\", \"RF\"  No results found for: \"CRP\"  No components found for: \"CKTOT\", \"URICACID\"  No components found for: \"C3\", \"C4\", \"DSDNAAB\", \"NDNAABIFA\"  No results found for: \"MPOAB\"    Patient reported Quality of Life (QOL) Measures   Patient Supplied Answers To VHI Questionnaire      8/7/2023     7:52 AM   Voice Handicap Index (VHI-10)   My voice makes it difficult for people to hear me 2    People have difficulty understanding me in a noisy room 2    My voice difficulties restrict my personal and social life.  1    I feel left out of conversations because of my voice 1    My voice problem causes me to lose income 0    I feel as though I have to strain to produce voice 2    The clarity of my voice is unpredictable 2    My voice problem upsets me 2    My voice makes me feel handicapped 0    People ask, \"What's wrong with your voice?\" 1    VHI-10 13       Patient-reported         Patient Supplied Answers To EAT Questionnaire      3/28/2023     2:02 PM   Eating Assessment Tool (EAT-10)   My swallowing problem has caused me to lose weight 0    My swallowing problem interferes with my ability to go out for meals 0    Swallowing liquids takes extra effort 0    Swallowing solids takes extra effort 0  "   Swallowing pills takes extra effort 0    Swallowing is painful 0    The pleasure of eating is affected by my swallowing 0    When I swallow food sticks in my throat 0    I cough when I eat 0    Swallowing is stressful 0    EAT-10 0       Patient-reported         Patient Supplied Answers To CSI Questionnaire      8/7/2023     7:52 AM   Cough Severity Index (CSI)   My cough is worse when I lie down 0    My coughing problem causes me to restrict my personal and social life 0    I tend to avoid places because of my cough problem 0    I feel embarrassed because of my coughing problem 2    People ask, ''What's wrong?'' because I cough a lot 0    I run out of air when I cough 0    My coughing problem affects my voice 2    My coughing problem limits my physical activity 0    My coughing problem upsets me 3    People ask me if I am sick because I cough a lot 0    CSI Score 7       Patient-reported         [email or web address]        Scribe Disclosure:   I, LESLI COFFEY, am serving as a scribe; to document services personally performed by Haylie Polo MD -based on data collection and the provider's statements to me.     Provider Disclosure:  I agree with above History, Review of Systems, Physical exam and Plan.  I have reviewed the content of the documentation and have edited it as needed. I have personally performed the services documented here and the documentation accurately represents those services and the decisions I have made.      Electronically signed by:  Haylie Polo MD    Laryngology    [facility]  Department of  Otolaryngology - Head and Neck Surgery  Clinics & Surgery Center  [address] [place] [address]  Appointment line: [phone]  Fax: [phone]  [email or web address]

## 2024-11-07 ENCOUNTER — OFFICE VISIT (OUTPATIENT)
Dept: OTOLARYNGOLOGY | Facility: CLINIC | Age: 44
End: 2024-11-07
Payer: COMMERCIAL

## 2024-11-07 VITALS — BODY MASS INDEX: 36.82 KG/M2 | WEIGHT: 195 LBS | HEIGHT: 61 IN

## 2024-11-07 DIAGNOSIS — R06.02 SHORTNESS OF BREATH: ICD-10-CM

## 2024-11-07 DIAGNOSIS — R49.0 DYSPHONIA: Primary | ICD-10-CM

## 2024-11-07 DIAGNOSIS — J38.6 SUBGLOTTIC STENOSIS: ICD-10-CM

## 2024-11-07 PROCEDURE — 99214 OFFICE O/P EST MOD 30 MIN: CPT | Mod: 25 | Performed by: OTOLARYNGOLOGY

## 2024-11-07 PROCEDURE — 99207 PR NO CHARGE LOS: CPT

## 2024-11-07 PROCEDURE — 31575 DIAGNOSTIC LARYNGOSCOPY: CPT | Performed by: OTOLARYNGOLOGY

## 2024-11-07 NOTE — PATIENT INSTRUCTIONS
1.  You were seen in the ENT Clinic today by Dr. Polo. If you have any questions or concerns after your appointment, please call [phone]. Press option #1 for scheduling related needs. Press option #3 for Nurse advice.    2.  Dr. Polo has recommended the following:   - Schedule surgery     3.  Plan is to return to clinic post-operatively     How to Contact Us:  Send a Lovli message to your provider. Our team will respond to you via Lovli. Occasionally, we will need to call you to get further information.  For urgent matters (Monday-Friday, 8:00 AM-3:30 PM), call the ENT Clinic: [phone] and speak with a call center team member - they will route your call appropriately.   If you'd like to speak directly with a nurse, please call [phone]. We do our best to check voicemail frequently throughout the day, and will work to call you back within 1-2 days. For urgent matters, please use the general clinic phone numbers listed above.      Suzanne Ochoa  [phone]  [facility] Otolaryngology

## 2024-11-07 NOTE — LETTER
2024       RE: Anuradha Brown  [address] [address]     Dear Colleague,    Thank you for referring your patient, Anuradha Brown, to the [facility] EAR NOSE AND THROAT CLINIC [place] [facility]. Please see a copy of my visit note below.        [facility]   at the [facility]   Otolaryngology Clinic     Patient: Anuradha Brown    MRN: [id number]    : 1980    Age/Gender: 44 year old female  Date of Service: 2024  Rendering Provider:   Haylie Polo MD         Impression & Plan     IMPRESSION: Ms. Brown is a 44 year old female who is being seen for the following:    Dyspnea  - due to subglottic stenosis   - no intubation history   - has diabetes, on Ozempic, most recent A1C is 6.5 (22)  - denies history of autoimmune disease  - CT chest 23 normal  - PFTs 23 is abnormal, stridor heard on exam  - denies reflux   - BMI is 40  - patient symptoms with shortness of breath with exertion and prolonged talking, one severe episode woke her from sleep and resulted in ER visit, on steroid inhalers but doesn't help  - scope shows grade 3 80% stenosis  - discussed etiology of trauma (intubation), autoimmune, diabetes or idiopathic. In this case this is most likely idiopathic given no prior intubation, but also has component of diabetes, need to rule out autoimmune issues  - discussed treatment with observation, conservative management with oral abx/steroids/reflux precautions, steroid injections, endoscopic procedures, open resection and bypass procedure with tracheotomy  - patient elects to proceed with endoscopic surgery  - s/p CO2 laser, CRE balloon dilation, kenalog injection 23  - symptoms 2023 are improved breathing with average peak flow of 300 and max of 350, peak flow today is 350, has mucus sensation with need to cough and throat clear, hoarse voice with prolonged talking,  walked up 3 flights of stairs yesterday  - scope shows grade 1 5% narrowing, mucus in subglottic space  - given mucus sensation, recommend nebulizer or Mucinex, has daughter's nebulizer  - recommend voice therapy  - discussed observation with increased physical activity/weight management and close monitoring of diabetes versus conservative management with oral abx/steroids/reflux precautions versus steroid injections  - patient elects observation   - symptoms 9/21/2023 are stable, peak flow is 350, still has phlegm, still working on glucose, not checking too often  - scope shows stable subglottic opening, grade 1, 5% narrowing, mucus   - symptoms 7/9/2024 are stable, peak flow is still 350, not noticing more difficulty  - scope shows grade 1, 30% narrowing  - discussed options of observation, steroid inhaler, steroid injection  - she will think about her options and let me know   - has a nebulizer through her daughters  - symptoms 11/4/2024 are ***  - scope shows ***  Plan  - use the nebulizer for the mucus plugs  - increased physical activity  - close monitoring of diabetes  - peak flow meter  - will let me know about steroid inhaler vs steroid injections        RETURN VISIT: ***    Chief Complaint   Subglottic stenosis  Dyspnea  S/p CO2 laser, CRE balloon dilation, kenalog injection 4/19/23  Interval History   HISTORY OF PRESENT ILLNESS: Ms. Brown is a 42 year old female is being followed for dyspnea. She was initially seen on 3/28/23. Please refer to this note for full history.     Today, she presents for follow up. she reports:  - ***    Dysphonia:  ***  She reports the voice problem began   ago and has   over time. She feels the problem was caused by   and it bothers her  . More specifically, she has been experiencing   . She has   vocal demands with  .    Dysphagia:  ***  She reports that the swallowing problem began   ago, has   over time, and bothers her  . With regard to symptoms, She notes  . She  maintains a   and   diet.     Dyspnea:  ***  She reports that the breathing problem began   ago, has   over time, and bothers her  . With regard to symptoms, she notes  . In regards to exertion, her breathing problem can be triggered by  .    Coughing / Throat-clearing:  ***  She reports that the cough / throat-clearing problem began   ago, has   over time, and bothers her  . With regard to symptoms, she notes  . Her cough / throat-clearing can be triggered by  .    GERD/LPRD:  ***     PAST MEDICAL HISTORY:   Past Medical History:   Diagnosis Date     Other and unspecified ovarian cyst 9/23/98    ultrasound done       PAST SURGICAL HISTORY:   Past Surgical History:   Procedure Laterality Date     BRONCHOSCOPY FLEXIBLE N/A 4/19/2023    Procedure: Bronchoscopy flexible and biopsy;  Surgeon: Haylie Polo MD;  Location: [facility] OR     INJECT STEROID (LOCATION) N/A 4/19/2023    Procedure: steroid injection;  Surgeon: Haylie Polo MD;  Location: [facility] OR     LASER CO2 LARYNGOSCOPY N/A 4/19/2023    Procedure: carbon dioxide laser resection of stenosis, CRE balloon dilation;  Surgeon: Haylie Polo MD;  Location: [facility] OR       CURRENT MEDICATIONS:   Current Outpatient Medications:      ACCU-CHEK GUIDE test strip, test TWICE DAILY, Disp: 200 strip, Rfl: 3     blood glucose monitoring (SOFTCLIX) lancets, USE TWICE DAILY ***PT NEEDS NEW RX***, Disp: 200 each, Rfl: 3     MOUNJARO 10 MG/0.5ML pen, Inject 10 mg Subcutaneous every 7 days, Disp: 2 mL, Rfl: 1     STATIN NOT PRESCRIBED (INTENTIONAL), Please choose reason not prescribed from choices below. Patient wants to wait a year., Disp: , Rfl:      tirzepatide (MOUNJARO) 12.5 MG/0.5ML pen, Inject 12.5 mg subcutaneously every 7 days, Disp: 2 mL, Rfl: 3     tranexamic acid (LYSTEDA) 650 MG tablet, Take 2 tablets (1,300 mg) by mouth 3 times daily for 5 days on the heaviest days of your menstrual period. (Patient not taking: Reported on 1/2/2024), Disp: 30 tablet, Rfl: 3    ALLERGIES: No  known drug allergy    SOCIAL HISTORY:    Social History     Socioeconomic History     Marital status:      Spouse name: Not on file     Number of children: Not on file     Years of education: Not on file     Highest education level: Not on file   Occupational History     Not on file   Tobacco Use     Smoking status: Never     Passive exposure: Never     Smokeless tobacco: Never   Vaping Use     Vaping status: Never Used   Substance and Sexual Activity     Alcohol use: Yes     Comment: Rarely     Drug use: Never     Sexual activity: Yes     Partners: Male     Birth control/protection: Male Surgical   Other Topics Concern     Not on file   Social History Narrative     Not on file     Social Drivers of Health     Financial Resource Strain: Low Risk  (1/2/2024)    Financial Resource Strain      Within the past 12 months, have you or your family members you live with been unable to get utilities (heat, electricity) when it was really needed?: No   Food Insecurity: Low Risk  (1/2/2024)    Food Insecurity      Within the past 12 months, did you worry that your food would run out before you got money to buy more?: No      Within the past 12 months, did the food you bought just not last and you didn t have money to get more?: No   Transportation Needs: Low Risk  (1/2/2024)    Transportation Needs      Within the past 12 months, has lack of transportation kept you from medical appointments, getting your medicines, non-medical meetings or appointments, work, or from getting things that you need?: No   Physical Activity: Not on file   Stress: Not on file   Social Connections: Not on file   Interpersonal Safety: Low Risk  (11/16/2023)    Interpersonal Safety      Do you feel physically and emotionally safe where you currently live?: Yes      Within the past 12 months, have you been hit, slapped, kicked or otherwise physically hurt by someone?: No      Within the past 12 months, have you been humiliated or emotionally  abused in other ways by your partner or ex-partner?: No   Housing Stability: Low Risk  (1/2/2024)    Housing Stability      Do you have housing? : Yes      Are you worried about losing your housing?: No         FAMILY HISTORY: No family history on file.   Non-contributory for problems with anesthesia    REVIEW OF SYSTEMS:   The patient was asked a 14 point review of systems regarding constitutional symptoms, eye symptoms, ears, nose, mouth, throat symptoms, cardiovascular symptoms, respiratory symptoms, gastrointestinal symptoms, genitourinary symptoms, musculoskeletal symptoms, integumentary symptoms, neurological symptoms, psychiatric symptoms, endocrine symptoms, hematologic/lymphatic symptoms, and allergic/ immunologic symptoms.   The pertinent factors have been included in the HPI and below.  Patient Supplied Answers to Review of Systems      3/28/2023     1:49 PM   [facility] ROS   Psychology Frequently feeling anxious   Cardiopulmonary Cough    Breathing problems    Wheezing       Physical Examination   The patient underwent a physical examination as described below. The pertinent positive and negative findings are summarized after the description of the examination.  Constitutional: The patient's developmental and nutritional status was assessed. The patient's voice quality was assessed.  Head and Face: The head and face were inspected for deformities. The sinuses were palpated. The salivary glands were palpated. Facial muscle strength was assessed bilaterally.  Eyes: Extraocular movements and primary gaze alignment were assessed.  Ears, Nose, Mouth and Throat: The ears and nose were examined for deformities. The nasal septum, mucosa, and turbinates were inspected by anterior rhinoscopy. The lips, teeth, and gums were examined for abnormalities. The oral mucosa, tongue, palate, tonsils, lateral and posterior pharynx were inspected for the presence of asymmetry or mucosal lesions.    Neck: The tracheal position  "was noted, and the neck mass palpated to determine if there were any asymmetries, abnormal neck masses, thyromegally, or thyroid nodules.  Respiratory: The nature of the breathing and chest expansion/symmetry was observed.  Cardiovascular: The patient was examined to determine the presence of any edema or jugular venous distension.  Abdomen: The contour of the abdomen was noted.  Lymphatic: The patient was examined for infraclavicular lymphadenopathy.  Musculoskeletal: The patient was inspected for the presence of skeletal deformities.  Extremities: The extremities were examined for any clubbing or cyanosis.  Skin: The skin was examined for inflammatory or neoplastic conditions.  Neurologic: The patient's orientation, mood, and affect were noted. The cranial nerve  functions were examined.  Other pertinent positive and negative findings on physical examination:   OC/OP: no lesions, uvula midline, soft palate elevates symmetrically   Neck: no lesions, no TH tenderness to palpation  ***  All other physical examination findings were within normal limits and noncontributory.    Procedures   ***    Review of Relevant Clinical Data   I personally reviewed:  Notes: ***  Radiology: ***  Pathology: ***  Procedures: ***  Labs:  Lab Results   Component Value Date    TSH 1.06 08/13/2021     Lab Results   Component Value Date     04/06/2023    CO2 25 04/06/2023    BUN 15.9 04/06/2023     Lab Results   Component Value Date    WBC 6.0 04/06/2023    HGB 14.3 04/06/2023    HCT 43.1 04/06/2023    MCV 89 04/06/2023     04/06/2023     No results found for: \"PT\", \"PTT\", \"INR\"  No results found for: \"DRAKE\"  No components found for: \"RHEUMATOIDFACTOR\", \"RF\"  No results found for: \"CRP\"  No components found for: \"CKTOT\", \"URICACID\"  No components found for: \"C3\", \"C4\", \"DSDNAAB\", \"NDNAABIFA\"  No results found for: \"MPOAB\"    Patient reported Quality of Life (QOL) Measures   Patient Supplied Answers To I Questionnaire      " "8/7/2023     7:52 AM   Voice Handicap Index (VHI-10)   My voice makes it difficult for people to hear me 2    People have difficulty understanding me in a noisy room 2    My voice difficulties restrict my personal and social life.  1    I feel left out of conversations because of my voice 1    My voice problem causes me to lose income 0    I feel as though I have to strain to produce voice 2    The clarity of my voice is unpredictable 2    My voice problem upsets me 2    My voice makes me feel handicapped 0    People ask, \"What's wrong with your voice?\" 1    VHI-10 13       Patient-reported         Patient Supplied Answers To EAT Questionnaire      3/28/2023     2:02 PM   Eating Assessment Tool (EAT-10)   My swallowing problem has caused me to lose weight 0    My swallowing problem interferes with my ability to go out for meals 0    Swallowing liquids takes extra effort 0    Swallowing solids takes extra effort 0    Swallowing pills takes extra effort 0    Swallowing is painful 0    The pleasure of eating is affected by my swallowing 0    When I swallow food sticks in my throat 0    I cough when I eat 0    Swallowing is stressful 0    EAT-10 0       Patient-reported         Patient Supplied Answers To CSI Questionnaire      8/7/2023     7:52 AM   Cough Severity Index (CSI)   My cough is worse when I lie down 0    My coughing problem causes me to restrict my personal and social life 0    I tend to avoid places because of my cough problem 0    I feel embarrassed because of my coughing problem 2    People ask, ''What's wrong?'' because I cough a lot 0    I run out of air when I cough 0    My coughing problem affects my voice 2    My coughing problem limits my physical activity 0    My coughing problem upsets me 3    People ask me if I am sick because I cough a lot 0    CSI Score 7       Patient-reported         [email or web address]        Scribe Disclosure:   I, LESLI COFFEY, am serving as a scribe; to document services " personally performed by Haylie Polo MD -based on data collection and the provider's statements to me.     Provider Disclosure:  I agree with above History, Review of Systems, Physical exam and Plan.  I have reviewed the content of the documentation and have edited it as needed. I have personally performed the services documented here and the documentation accurately represents those services and the decisions I have made.      Electronically signed by:  Haylie Polo MD    Laryngology    [facility]  Department of  Otolaryngology - Head and Neck Surgery  [facility] [place] [address]  Appointment line: [phone]  Fax: [phone]  [email or web address]         Again, thank you for allowing me to participate in the care of your patient.      Sincerely,    Haylie Polo MD

## 2024-11-08 ENCOUNTER — PREP FOR PROCEDURE (OUTPATIENT)
Dept: OTOLARYNGOLOGY | Facility: CLINIC | Age: 44
End: 2024-11-08
Payer: COMMERCIAL

## 2024-11-08 ENCOUNTER — TELEPHONE (OUTPATIENT)
Dept: OTOLARYNGOLOGY | Facility: CLINIC | Age: 44
End: 2024-11-08
Payer: COMMERCIAL

## 2024-11-08 DIAGNOSIS — J38.6 SUBGLOTTIC STENOSIS: Primary | ICD-10-CM

## 2024-11-08 RX ORDER — CEFAZOLIN SODIUM 2 G/50ML
2 SOLUTION INTRAVENOUS SEE ADMIN INSTRUCTIONS
OUTPATIENT
Start: 2024-11-08

## 2024-11-08 RX ORDER — CEFAZOLIN SODIUM 2 G/50ML
2 SOLUTION INTRAVENOUS
OUTPATIENT
Start: 2024-11-08

## 2024-11-08 NOTE — TELEPHONE ENCOUNTER
Left patient a voicemail to schedule surgery for MICROLARYNGOSCOPY, flexible bronchoscopy, carbon dioxide laser resection of stenosis, CRE balloon dilation, steroid injection, possible biopsy (N/A) with Dr. Polo - [facility] Scheduling line for callback [phone]    Quin Thompson on 11/8/2024 at 2:52 PM

## 2024-11-11 NOTE — TELEPHONE ENCOUNTER
Scheduled surgery with Dr. Polo on 11/20/2024    Spoke with: Patient    Surgery is located at [facility]    Patient will be seen for their H&P by their PCP Inocente Gutierrez MD within 30 days of surgery - Confirmed PCP on file is up to date     Does patient need a consult before upcoming surgery? No    Anesthesia type: General    Requested Imaging required for surgery: No    Patient is scheduled for their 4 week post op on 12/19/2024 at 145pm with Dr. Polo    Patient will receive their surgery packet via [employer] per their preference    Patient was not provided a start time for surgery & is aware they will receive this information 2-3 days before surgery    Additional comments: patient is going out of town on 11/27 and wants to confirm that is okay     Patient was instructed to call back with any further questions or concerns.     Quin Thompson on 11/11/2024 at 8:30 AM

## 2024-11-12 RX ORDER — TIRZEPATIDE 12.5 MG/.5ML
12.5 INJECTION, SOLUTION SUBCUTANEOUS
COMMUNITY
Start: 2024-10-31 | End: 2024-11-15

## 2024-11-14 ASSESSMENT — ASTHMA QUESTIONNAIRES
QUESTION_2 LAST FOUR WEEKS HOW OFTEN HAVE YOU HAD SHORTNESS OF BREATH: ONCE OR TWICE A WEEK
ACT_TOTALSCORE: 24
ACT_TOTALSCORE: 24
QUESTION_4 LAST FOUR WEEKS HOW OFTEN HAVE YOU USED YOUR RESCUE INHALER OR NEBULIZER MEDICATION (SUCH AS ALBUTEROL): NOT AT ALL
QUESTION_5 LAST FOUR WEEKS HOW WOULD YOU RATE YOUR ASTHMA CONTROL: COMPLETELY CONTROLLED
QUESTION_3 LAST FOUR WEEKS HOW OFTEN DID YOUR ASTHMA SYMPTOMS (WHEEZING, COUGHING, SHORTNESS OF BREATH, CHEST TIGHTNESS OR PAIN) WAKE YOU UP AT NIGHT OR EARLIER THAN USUAL IN THE MORNING: NOT AT ALL
QUESTION_1 LAST FOUR WEEKS HOW MUCH OF THE TIME DID YOUR ASTHMA KEEP YOU FROM GETTING AS MUCH DONE AT WORK, SCHOOL OR AT HOME: NONE OF THE TIME

## 2024-11-14 NOTE — PROGRESS NOTES
[facility] VOICE [facility]    Clinician: TUNDE Fox.MO MENDEZ (voice), M.A., CCC-SLP  Seen in conjunction with: Dr. Haylie Polo  Patient: Anuradha Brown  Date of Visit: 11/7/2024    HISTORY  PATIENT INFORMATION  Anuradha Brown was seen for brief consultation today.  Please refer to Dr. Haylie Polo s dictation for a more complete history and impressions.      Evaluation was begun, but has not been completed.  It was determined that her airway was significantly reduced and will need surgical intervention.  I will follow up with her at her post-operative follow up with Dr. Polo to determine whether speech therapy is warranted to improve her respiratory mechanics and resolve any laryngeal irritation    DIAGNOSIS/REASON FOR REFERRAL  Dysphonia/subglottic stenosis/ Evaluate, perform laryngeal exam, treat as appropriate    No charge for today s session; charges will be billed at the completion of the evaluation  NO CHARGE FACILITY FEE (73555)    ICD-10 code (s): R49.0 (Dysphonia), subglottic stenosis    Time: 15 min    Hannah Kulkarni M.M. (voice), M.A., CCC/SLP  Speech-Language Pathologist  [facility]  [phone]

## 2024-11-15 ENCOUNTER — ORDERS ONLY (AUTO-RELEASED) (OUTPATIENT)
Dept: FAMILY MEDICINE | Facility: CLINIC | Age: 44
End: 2024-11-15

## 2024-11-15 ENCOUNTER — OFFICE VISIT (OUTPATIENT)
Dept: FAMILY MEDICINE | Facility: CLINIC | Age: 44
End: 2024-11-15
Payer: COMMERCIAL

## 2024-11-15 VITALS
RESPIRATION RATE: 16 BRPM | DIASTOLIC BLOOD PRESSURE: 62 MMHG | SYSTOLIC BLOOD PRESSURE: 100 MMHG | BODY MASS INDEX: 36.06 KG/M2 | HEART RATE: 80 BPM | WEIGHT: 191 LBS | OXYGEN SATURATION: 97 % | TEMPERATURE: 97.9 F | HEIGHT: 61 IN

## 2024-11-15 DIAGNOSIS — N93.9 ABNORMAL UTERINE BLEEDING (AUB): ICD-10-CM

## 2024-11-15 DIAGNOSIS — Z23 NEED FOR VACCINATION: ICD-10-CM

## 2024-11-15 DIAGNOSIS — Z12.11 SCREEN FOR COLON CANCER: ICD-10-CM

## 2024-11-15 DIAGNOSIS — E11.9 TYPE 2 DIABETES MELLITUS WITHOUT COMPLICATION, WITHOUT LONG-TERM CURRENT USE OF INSULIN (H): Primary | ICD-10-CM

## 2024-11-15 DIAGNOSIS — Z01.818 PREOP GENERAL PHYSICAL EXAM: ICD-10-CM

## 2024-11-15 DIAGNOSIS — J38.6 SUBGLOTTIC STENOSIS NOT DUE TO SURGERY: ICD-10-CM

## 2024-11-15 LAB
ANION GAP SERPL CALCULATED.3IONS-SCNC: 6 MMOL/L (ref 7–15)
BASOPHILS # BLD AUTO: 0 10E3/UL (ref 0–0.2)
BASOPHILS NFR BLD AUTO: 0 %
BUN SERPL-MCNC: 17.2 MG/DL (ref 6–20)
CALCIUM SERPL-MCNC: 9.2 MG/DL (ref 8.8–10.4)
CHLORIDE SERPL-SCNC: 106 MMOL/L (ref 98–107)
CREAT SERPL-MCNC: 0.8 MG/DL (ref 0.51–0.95)
CREAT UR-MCNC: 204 MG/DL
EGFRCR SERPLBLD CKD-EPI 2021: >90 ML/MIN/1.73M2
EOSINOPHIL # BLD AUTO: 0.2 10E3/UL (ref 0–0.7)
EOSINOPHIL NFR BLD AUTO: 3 %
ERYTHROCYTE [DISTWIDTH] IN BLOOD BY AUTOMATED COUNT: 12.7 % (ref 10–15)
EST. AVERAGE GLUCOSE BLD GHB EST-MCNC: 117 MG/DL
GLUCOSE SERPL-MCNC: 126 MG/DL (ref 70–99)
HBA1C MFR BLD: 5.7 % (ref 0–5.6)
HCG UR QL: NEGATIVE
HCO3 SERPL-SCNC: 26 MMOL/L (ref 22–29)
HCT VFR BLD AUTO: 44.1 % (ref 35–47)
HGB BLD-MCNC: 14.5 G/DL (ref 11.7–15.7)
IMM GRANULOCYTES # BLD: 0 10E3/UL
IMM GRANULOCYTES NFR BLD: 0 %
LYMPHOCYTES # BLD AUTO: 2.1 10E3/UL (ref 0.8–5.3)
LYMPHOCYTES NFR BLD AUTO: 36 %
MCH RBC QN AUTO: 29.5 PG (ref 26.5–33)
MCHC RBC AUTO-ENTMCNC: 32.9 G/DL (ref 31.5–36.5)
MCV RBC AUTO: 90 FL (ref 78–100)
MICROALBUMIN UR-MCNC: 57.6 MG/L
MICROALBUMIN/CREAT UR: 28.24 MG/G CR (ref 0–25)
MONOCYTES # BLD AUTO: 0.4 10E3/UL (ref 0–1.3)
MONOCYTES NFR BLD AUTO: 6 %
NEUTROPHILS # BLD AUTO: 3.2 10E3/UL (ref 1.6–8.3)
NEUTROPHILS NFR BLD AUTO: 55 %
PLATELET # BLD AUTO: 311 10E3/UL (ref 150–450)
POTASSIUM SERPL-SCNC: 4.3 MMOL/L (ref 3.4–5.3)
RBC # BLD AUTO: 4.91 10E6/UL (ref 3.8–5.2)
SODIUM SERPL-SCNC: 138 MMOL/L (ref 135–145)
WBC # BLD AUTO: 5.8 10E3/UL (ref 4–11)

## 2024-11-15 RX ORDER — LANCETS
EACH MISCELLANEOUS
Qty: 200 EACH | Refills: 3 | Status: SHIPPED | OUTPATIENT
Start: 2024-11-15

## 2024-11-15 NOTE — PROGRESS NOTES
Preoperative Evaluation  [facility]  [address] [address]  Phone: [phone]  Fax: [phone]  Primary Provider: Inocente Gutierrez MD  Pre-op Performing Provider: Jackie Hoskins NP  Nov 15, 2024             11/14/2024   Surgical Information   What procedure is being done? MICROLARYNGOSCOPY, flexible bronchoscopy, carbon dioxide laser resection of stenosis, CRE balloon dilation,  possible biopsy - steroid injection    Facility or Hospital where procedure/surgery will be performed: [facility]       Who is doing the procedure / surgery? Dr. Haylie Polo   Date of surgery / procedure: 11/20/24    Time of surgery / procedure: TBD   Where do you plan to recover after surgery? at home with family        Patient-reported     Fax number for surgical facility: Note does not need to be faxed, will be available electronically in Epic.    Assessment & Plan     The proposed surgical procedure is considered INTERMEDIATE risk.    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  Comment:   Plan: HCG qualitative urine            (Z23) Need for vaccination  Comment:   Plan: TDAP 10-64Y (ADACEL,BOOSTRIX)            (N93.9) Abnormal uterine bleeding (AUB)  Comment:   Plan: resolved with IUD placement last spring but she is not pleased with  occasional spotting, advised this is normal but she could talk to GYN again about more definitive solutions            - No identified additional risk factors other than previously addressed    Preoperative Medication Instructions  Antiplatelet or Anticoagulation Medication Instructions   - Patient is on no antiplatelet or anticoagulation medications.    Additional Medication Instructions  Do NOT take Mounjaro today, continue to hold till after surgery    Recommendation  Approval given to proceed with proposed procedure, without further diagnostic evaluation.    Ragini Varma is a 44 year old, presenting for the following:    Has lost #20 in 2 years with Mounjaro.  Feels she is now super hungry, dose was recently increased to 12.5 mg  Pre-Op Exam    Answers submitted by the patient for this visit:  Patient Health Questionnaire (Submitted on 11/14/2024)  If you checked off any problems, how difficult have these problems made it for you to do your work, take care of things at home, or get along with other people?: Not difficult at all  PHQ9 TOTAL SCORE: 2          11/15/2024     8:48 AM   Additional Questions   Roomed by julio harris   Accompanied by self         11/15/2024   Forms   Any forms needing to be completed Yes        HPI related to upcoming procedure:         11/14/2024   Pre-Op Questionnaire   Have you ever had a heart attack or stroke? No    Have you ever had surgery on your heart or blood vessels, such as a stent placement, a coronary artery bypass, or surgery on an artery in your head, neck, heart, or legs? No    Do you have chest pain with activity? No    Do you have a history of heart failure? No    Do you currently have a cold, bronchitis or symptoms of other infection? No    Do you have a cough, shortness of breath, or wheezing? (!) YES this is why she is having surgery    Do you or anyone in your family have previous history of blood clots? No    Do you or does anyone in your family have a serious  bleeding problem such as prolonged bleeding following surgeries or cuts? No    Have you ever had problems with anemia or been told to take iron pills? No    Have you had any abnormal blood loss such as black, tarry or bloody stools, or abnormal vaginal bleeding? No    Have you ever had a blood transfusion? No    Are you willing to have a blood transfusion if it is medically needed before, during, or after your surgery? Yes    Have you or any of your relatives ever had problems with anesthesia? No    Do you have sleep apnea, excessive snoring or daytime drowsiness? No    Do you have any artifical heart valves or other implanted medical devices like a pacemaker, defibrillator, or continuous glucose monitor? No    Do you have artificial joints? No    Are you allergic to latex? No        Patient-reported     Health Care Directive  Patient does not have a Health Care Directive: Patient states has Advance Directive and will bring in a copy to clinic.    Preoperative Review of    reviewed - no record of controlled substances prescribed.          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 11/15/2024     Priority: Medium    Abnormal uterine bleeding (AUB) 03/05/2024     Priority: Medium     Has iud inserted 2024      Cervical cancer screening 11/29/2023     Priority: Medium     Hx of abnormal pap, Abnormal PAP prior to 2010 11/16/23 NIL Pap, Neg HR HPV Plan cotest in 1 year due 11/16/24 per provider  10/28/24 Reminder Mychart        Vocal cord disease 03/22/2023     Priority: Medium    Seasonal allergic rhinitis due to pollen 01/30/2023     Priority: Medium    Morbid obesity (H) 11/18/2022     Priority: Medium    Abnormal cytology finding 04/29/2022     Priority: Medium    History of varicella 04/29/2022     Priority: Medium    Mild major depression (H) 04/29/2022     Priority: Medium    Obesity 04/29/2022     Priority: Medium    Type 2 diabetes mellitus (H)  "04/29/2022     Priority: Medium    Sinus drainage 04/07/2021     Priority: Medium      Past Medical History:   Diagnosis Date    Arthritis     Diabetes (H) 11/2021    Other and unspecified ovarian cyst 09/23/1998    ultrasound done    Uncomplicated asthma 11/2021     Past Surgical History:   Procedure Laterality Date    BRONCHOSCOPY FLEXIBLE N/A 4/19/2023    Procedure: Bronchoscopy flexible and biopsy;  Surgeon: Haylie Polo MD;  Location: [facility] OR    INJECT STEROID (LOCATION) N/A 4/19/2023    Procedure: steroid injection;  Surgeon: Haylie Polo MD;  Location: [facility] OR    LASER CO2 LARYNGOSCOPY N/A 4/19/2023    Procedure: carbon dioxide laser resection of stenosis, CRE balloon dilation;  Surgeon: Haylie Polo MD;  Location: [facility] OR     Current Outpatient Medications   Medication Sig Dispense Refill    levonorgestrel (MIRENA) 52 MG (20 mcg/day) IUD by Intrauterine route once.      tirzepatide (MOUNJARO) 12.5 MG/0.5ML pen Inject 12.5 mg subcutaneously every 7 days 2 mL 3    ACCU-CHEK GUIDE test strip test TWICE DAILY 200 strip 3    blood glucose monitoring (SOFTCLIX) lancets Use to test blood sugar 1x  times daily or as directed. 200 each 3    STATIN NOT PRESCRIBED (INTENTIONAL) Please choose reason not prescribed from choices below. Patient wants to wait a year.         Allergies   Allergen Reactions    No Known Drug Allergy         Social History     Tobacco Use    Smoking status: Never     Passive exposure: Never    Smokeless tobacco: Never   Substance Use Topics    Alcohol use: Yes     Comment: once a month       History   Drug Use Unknown               Objective    /62 (BP Location: Right arm, Patient Position: Sitting)   Pulse 80   Temp 97.9  F (36.6  C)   Resp 16   Ht 1.537 m (5' 0.5\")   Wt 86.6 kg (191 lb)   LMP  (LMP Unknown)   SpO2 97%   Breastfeeding No   BMI 36.69 kg/m     Estimated body mass index is 36.69 kg/m  as calculated from the following:    Height as of this encounter: 1.537 m (5' " "0.5\").    Weight as of this encounter: 86.6 kg (191 lb).  Physical Exam  GENERAL: alert and no distress  EYES: Eyes grossly normal to inspection, PERRL and conjunctivae and sclerae normal  HENT: ear canals and TM's normal, nose and mouth without ulcers or lesions  NECK: no adenopathy, no asymmetry, masses, or scars  RESP: lungs clear to auscultation - no rales, rhonchi or wheezes  CV: regular rate and rhythm, normal S1 S2, no S3 or S4, no murmur, click or rub, no peripheral edema  ABDOMEN: soft, nontender, no hepatosplenomegaly, no masses and bowel sounds normal  MS: no gross musculoskeletal defects noted, no edema  NEURO: Normal strength and tone, mentation intact and speech normal  PSYCH: mentation appears normal, affect normal/bright    Recent Labs   Lab Test 01/02/24  1335   A1C 5.8*        Diagnostics  Labs pending at this time.  Results will be reviewed when available.   No EKG required, no history of coronary heart disease, significant arrhythmia, peripheral arterial disease or other structural heart disease.    Revised Cardiac Risk Index (RCRI)  The patient has the following serious cardiovascular risks for perioperative complications:   - No serious cardiac risks = 0 points     RCRI Interpretation: 0 points: Class I (very low risk - 0.4% complication rate)         Signed Electronically by: Jackie Hoskins NP  A copy of this evaluation report is provided to the requesting physician.         "

## 2024-11-15 NOTE — PATIENT INSTRUCTIONS
How to Take Your Medication Before Surgery  Preoperative Medication Instructions   Antiplatelet or Anticoagulation Medication Instructions   - Patient is on no antiplatelet or anticoagulation medications.    Additional Medication Instructions  Do NOT take Mounjaro today, continue to hold till after surgery       Patient Education   Preparing for Your Surgery  For Adults  Getting started  In most cases, a nurse will call to review your health history and instructions. They will give you an arrival time based on your scheduled surgery time. Please be ready to share:  Your doctor's clinic name and phone number  Your medical, surgical, and anesthesia history  A list of allergies and sensitivities  A list of medicines, including herbal treatments and over-the-counter drugs  Whether the patient has a legal guardian (ask how to send us the papers in advance)  Note: You may not receive a call if you were seen at our PAC (Preoperative Assessment Center).  Please tell us if you're pregnant--or if there's any chance you might be pregnant. Some surgeries may injure a fetus (unborn baby), so they require a pregnancy test. Surgeries that are safe for a fetus don't always need a test, and you can choose whether to have one.   Preparing for surgery  Within 10 to 30 days of surgery: Have a pre-op exam (sometimes called an H&P, or History and Physical). This can be done at a clinic or pre-operative center.  If you're having a , you may not need this exam. Talk to your care team.  At your pre-op exam, talk to your care team about all medicines you take. (This includes CBD oil and any drugs, such as THC, marijuana, and other forms of cannabis.) If you need to stop any medicine before surgery, ask when to start taking it again.  This is for your safety. Many medicines and drugs can make you bleed too much during surgery. Some change how well surgery (anesthesia) drugs work.  Call your insurance company to let them know you're  having surgery. (If you don't have insurance, call [phone].)  Call your clinic if there's any change in your health. This includes a scrape or scratch near the surgery site, or any signs of a cold (sore throat, runny nose, cough, rash, fever).  Eating and drinking guidelines  For your safety: Unless your surgeon tells you otherwise, follow the guidelines below.  Eat and drink as normal until 8 hours before you arrive for surgery. After that, no food or milk. You can spit out gum when you arrive.  Drink clear liquids until 2 hours before you arrive. These are liquids you can see through, like water, Gatorade, and Propel Water. They also include plain black coffee and tea (no cream or milk).  No alcohol for 24 hours before you arrive. The night before surgery, stop any drinks that contain THC.  If your care team tells you to take medicine on the morning of surgery, it's okay to take it with a sip of water. No other medicines or drugs are allowed (including CBD oil)--follow your care team's instructions.  If you have questions the day of surgery, call your hospital or surgery center.   Preventing infection  Shower or bathe the night before and the morning of surgery. Follow the instructions your clinic gave you. (If no instructions, use regular soap.)  Don't shave or clip hair near your surgery site. We'll remove the hair if needed.  Don't smoke or vape the morning of surgery. No chewing tobacco for 6 hours before you arrive. A nicotine patch is okay. You may spit out nicotine gum when you arrive.  For some surgeries, the surgeon will tell you to fully quit smoking and nicotine.  We will make every effort to keep you safe from infection. We will:  Clean our hands often with soap and water (or an alcohol-based hand rub).  Clean the skin at your surgery site with a special soap that kills germs.  Give you a special gown to keep you warm. (Cold raises the risk of infection.)  Wear hair covers, masks, gowns, and  gloves during surgery.  Give antibiotic medicine, if prescribed. Not all surgeries need this medicine.  What to bring on the day of surgery  Photo ID and insurance card  Copy of your health care directive, if you have one  Glasses and hearing aids (bring cases)  You can't wear contacts during surgery  Inhaler and eye drops, if you use them (tell us about these when you arrive)  CPAP machine or breathing device, if you use them  A few personal items, if spending the night  If you have . . .  A pacemaker, ICD (cardiac defibrillator), or other implant: Bring the ID card.  An implanted stimulator: Bring the remote control.  A legal guardian: Bring a copy of the certified (court-stamped) guardianship papers.  Please remove any jewelry, including body piercings. Leave jewelry and other valuables at home.  If you're going home the day of surgery  You must have a responsible adult drive you home. They should stay with you overnight as well.  If you don't have someone to stay with you, and you aren't safe to go home alone, we may keep you overnight. Insurance often won't pay for this.  After surgery  If it's hard to control your pain or you need more pain medicine, please call your surgeon's office.  Questions?   If you have any questions for your care team, list them here:   ____________________________________________________________________________________________________________________________________________________________________________________________________________________________________________________________  For informational purposes only. Not to replace the advice of your health care provider. Copyright   2003, 2019 [facility]. All rights reserved. Clinically reviewed by Andrea No MD. [employer] [id number] - REV 08/24.

## 2024-11-18 ENCOUNTER — ANESTHESIA EVENT (OUTPATIENT)
Dept: SURGERY | Facility: CLINIC | Age: 44
End: 2024-11-18
Payer: COMMERCIAL

## 2024-11-18 ENCOUNTER — TELEPHONE (OUTPATIENT)
Dept: OTOLARYNGOLOGY | Facility: CLINIC | Age: 44
End: 2024-11-18
Payer: COMMERCIAL

## 2024-11-18 NOTE — TELEPHONE ENCOUNTER
Called patient assure her it was okay to travel one week after her procedure. Patient was agreeable and verbalized understanding of the situation. Suzanne Jain RN on 11/18/2024 at 11:06 AM

## 2024-11-19 NOTE — ANESTHESIA PREPROCEDURE EVALUATION
Anesthesia Pre-Procedure Evaluation    Patient: Anuradha Brown   MRN: [id number] : 1980        Procedure : Procedure(s):  MICROLARYNGOSCOPY, flexible bronchoscopy, carbon dioxide laser resection of stenosis, CRE balloon dilation,  possible biopsy  steroid injection          Past Medical History:   Diagnosis Date    Arthritis     Diabetes (H) 2021    Other and unspecified ovarian cyst 1998    ultrasound done    Uncomplicated asthma 2021      Past Surgical History:   Procedure Laterality Date    BRONCHOSCOPY FLEXIBLE N/A 2023    Procedure: Bronchoscopy flexible and biopsy;  Surgeon: Haylie Polo MD;  Location: [facility] OR    INJECT STEROID (LOCATION) N/A 2023    Procedure: steroid injection;  Surgeon: Haylie Polo MD;  Location: [facility] OR    LASER CO2 LARYNGOSCOPY N/A 2023    Procedure: carbon dioxide laser resection of stenosis, CRE balloon dilation;  Surgeon: Haylie Polo MD;  Location: [facility] OR      Allergies   Allergen Reactions    No Known Drug Allergy       Social History     Tobacco Use    Smoking status: Never     Passive exposure: Never    Smokeless tobacco: Never   Substance Use Topics    Alcohol use: Yes     Comment: once a month      Wt Readings from Last 1 Encounters:   11/15/24 86.6 kg (191 lb)        Anesthesia Evaluation   Pt has not had prior anesthetic    - PONV.      ROS/MED HX  ENT/Pulmonary:     (+)                      asthma  Treatment: Inhaled steroids,                 Neurologic:  - neg neurologic ROS     Cardiovascular:  - neg cardiovascular ROS  (-) murmur   METS/Exercise Tolerance: >4 METS    Hematologic:  - neg hematologic  ROS     Musculoskeletal:  - neg musculoskeletal ROS (+)  arthritis,             GI/Hepatic:  - neg GI/hepatic ROS     Renal/Genitourinary:  - neg Renal ROS     Endo:     (+)  type II DM,             Obesity,       Psychiatric/Substance Use:  - neg psychiatric ROS   (+) psychiatric history depression       Infectious Disease:      "  Malignancy:  - neg malignancy ROS     Other:            Physical Exam    Airway        Mallampati: III   TM distance: > 3 FB   Neck ROM: full     Respiratory Devices and Support         Dental       (+) Completely normal teeth      Cardiovascular          Rhythm and rate: regular and normal (-) no murmur    Pulmonary           breath sounds clear to auscultation           OUTSIDE LABS:  CBC:   Lab Results   Component Value Date    WBC 5.8 11/15/2024    WBC 6.0 04/06/2023    HGB 14.5 11/15/2024    HGB 14.3 04/06/2023    HCT 44.1 11/15/2024    HCT 43.1 04/06/2023     11/15/2024     04/06/2023     BMP:   Lab Results   Component Value Date     11/15/2024     04/06/2023    POTASSIUM 4.3 11/15/2024    POTASSIUM 4.3 04/06/2023    CHLORIDE 106 11/15/2024    CHLORIDE 105 04/06/2023    CO2 26 11/15/2024    CO2 25 04/06/2023    BUN 17.2 11/15/2024    BUN 15.9 04/06/2023    CR 0.80 11/15/2024    CR 0.99 (H) 04/06/2023     (H) 11/15/2024     (H) 04/19/2023     COAGS: No results found for: \"PTT\", \"INR\", \"FIBR\"  POC:   Lab Results   Component Value Date    HCG Negative 11/15/2024     HEPATIC: No results found for: \"ALBUMIN\", \"PROTTOTAL\", \"ALT\", \"AST\", \"GGT\", \"ALKPHOS\", \"BILITOTAL\", \"BILIDIRECT\", \"SONAL\"  OTHER:   Lab Results   Component Value Date    A1C 5.7 (H) 11/15/2024    JENA 9.2 11/15/2024    TSH 1.06 08/13/2021    SED 13 03/28/2023       Anesthesia Plan    ASA Status:  2       Anesthesia Type: General.     - Airway: LMA   Induction: Intravenous, Propofol.   Maintenance: TIVA.   Techniques and Equipment:     - Lines/Monitors: BIS     Consents            Postoperative Care    Pain management: IV analgesics, Oral pain medications, Multi-modal analgesia.   PONV prophylaxis: Ondansetron (or other 5HT-3), Dexamethasone or Solumedrol     Comments:               Vj GARRIDO Ma, MD    I have reviewed the pertinent notes and labs in the chart from the past 30 days and (re)examined the patient.  " "Any updates or changes from those notes are reflected in this note.                         # Obesity: Estimated body mass index is 36.69 kg/m  as calculated from the following:    Height as of 11/15/24: 1.537 m (5' 0.5\").    Weight as of 11/15/24: 86.6 kg (191 lb).             "

## 2024-11-20 ENCOUNTER — ANESTHESIA (OUTPATIENT)
Dept: SURGERY | Facility: CLINIC | Age: 44
End: 2024-11-20
Payer: COMMERCIAL

## 2024-11-20 ENCOUNTER — HOSPITAL ENCOUNTER (OUTPATIENT)
Facility: CLINIC | Age: 44
Discharge: HOME | End: 2024-11-20
Attending: OTOLARYNGOLOGY | Admitting: OTOLARYNGOLOGY
Payer: COMMERCIAL

## 2024-11-20 VITALS
DIASTOLIC BLOOD PRESSURE: 65 MMHG | OXYGEN SATURATION: 97 % | SYSTOLIC BLOOD PRESSURE: 100 MMHG | WEIGHT: 195.77 LBS | TEMPERATURE: 96.6 F | HEART RATE: 70 BPM | HEIGHT: 60 IN | BODY MASS INDEX: 38.43 KG/M2 | RESPIRATION RATE: 16 BRPM

## 2024-11-20 DIAGNOSIS — J38.6 SUBGLOTTIC STENOSIS NOT DUE TO SURGERY: Primary | ICD-10-CM

## 2024-11-20 LAB — GLUCOSE BLDC GLUCOMTR-MCNC: 109 MG/DL (ref 70–99)

## 2024-11-20 PROCEDURE — 250N000011 HC RX IP 250 OP 636

## 2024-11-20 PROCEDURE — 710N000012 HC RECOVERY PHASE 2, PER MINUTE: Performed by: OTOLARYNGOLOGY

## 2024-11-20 PROCEDURE — 250N000011 HC RX IP 250 OP 636: Performed by: OTOLARYNGOLOGY

## 2024-11-20 PROCEDURE — 82962 GLUCOSE BLOOD TEST: CPT

## 2024-11-20 PROCEDURE — 250N000013 HC RX MED GY IP 250 OP 250 PS 637

## 2024-11-20 PROCEDURE — 360N000077 HC SURGERY LEVEL 4, PER MIN: Performed by: OTOLARYNGOLOGY

## 2024-11-20 PROCEDURE — 999N000141 HC STATISTIC PRE-PROCEDURE NURSING ASSESSMENT: Performed by: OTOLARYNGOLOGY

## 2024-11-20 PROCEDURE — 250N000009 HC RX 250: Performed by: OTOLARYNGOLOGY

## 2024-11-20 PROCEDURE — C1726 CATH, BAL DIL, NON-VASCULAR: HCPCS | Performed by: OTOLARYNGOLOGY

## 2024-11-20 PROCEDURE — 250N000009 HC RX 250

## 2024-11-20 PROCEDURE — 370N000017 HC ANESTHESIA TECHNICAL FEE, PER MIN: Performed by: OTOLARYNGOLOGY

## 2024-11-20 PROCEDURE — 272N000001 HC OR GENERAL SUPPLY STERILE: Performed by: OTOLARYNGOLOGY

## 2024-11-20 PROCEDURE — 710N000010 HC RECOVERY PHASE 1, LEVEL 2, PER MIN: Performed by: OTOLARYNGOLOGY

## 2024-11-20 PROCEDURE — 258N000003 HC RX IP 258 OP 636

## 2024-11-20 RX ORDER — ONDANSETRON 2 MG/ML
INJECTION INTRAMUSCULAR; INTRAVENOUS PRN
Status: DISCONTINUED | OUTPATIENT
Start: 2024-11-20 | End: 2024-11-20

## 2024-11-20 RX ORDER — SODIUM CHLORIDE, SODIUM LACTATE, POTASSIUM CHLORIDE, CALCIUM CHLORIDE 600; 310; 30; 20 MG/100ML; MG/100ML; MG/100ML; MG/100ML
INJECTION, SOLUTION INTRAVENOUS CONTINUOUS
Status: DISCONTINUED | OUTPATIENT
Start: 2024-11-20 | End: 2024-11-20 | Stop reason: HOSPADM

## 2024-11-20 RX ORDER — LIDOCAINE 40 MG/G
CREAM TOPICAL
Status: DISCONTINUED | OUTPATIENT
Start: 2024-11-20 | End: 2024-11-20 | Stop reason: HOSPADM

## 2024-11-20 RX ORDER — ONDANSETRON 2 MG/ML
4 INJECTION INTRAMUSCULAR; INTRAVENOUS EVERY 30 MIN PRN
Status: DISCONTINUED | OUTPATIENT
Start: 2024-11-20 | End: 2024-11-20 | Stop reason: HOSPADM

## 2024-11-20 RX ORDER — SODIUM CHLORIDE, SODIUM LACTATE, POTASSIUM CHLORIDE, CALCIUM CHLORIDE 600; 310; 30; 20 MG/100ML; MG/100ML; MG/100ML; MG/100ML
INJECTION, SOLUTION INTRAVENOUS CONTINUOUS PRN
Status: DISCONTINUED | OUTPATIENT
Start: 2024-11-20 | End: 2024-11-20

## 2024-11-20 RX ORDER — ACETAMINOPHEN 325 MG/1
975 TABLET ORAL ONCE
Status: COMPLETED | OUTPATIENT
Start: 2024-11-20 | End: 2024-11-20

## 2024-11-20 RX ORDER — DEXAMETHASONE SODIUM PHOSPHATE 4 MG/ML
INJECTION, SOLUTION INTRA-ARTICULAR; INTRALESIONAL; INTRAMUSCULAR; INTRAVENOUS; SOFT TISSUE PRN
Status: DISCONTINUED | OUTPATIENT
Start: 2024-11-20 | End: 2024-11-20

## 2024-11-20 RX ORDER — DEXAMETHASONE SODIUM PHOSPHATE 4 MG/ML
4 INJECTION, SOLUTION INTRA-ARTICULAR; INTRALESIONAL; INTRAMUSCULAR; INTRAVENOUS; SOFT TISSUE
Status: DISCONTINUED | OUTPATIENT
Start: 2024-11-20 | End: 2024-11-20 | Stop reason: HOSPADM

## 2024-11-20 RX ORDER — NALOXONE HYDROCHLORIDE 0.4 MG/ML
0.1 INJECTION, SOLUTION INTRAMUSCULAR; INTRAVENOUS; SUBCUTANEOUS
Status: DISCONTINUED | OUTPATIENT
Start: 2024-11-20 | End: 2024-11-20 | Stop reason: HOSPADM

## 2024-11-20 RX ORDER — LIDOCAINE HYDROCHLORIDE 20 MG/ML
INJECTION, SOLUTION INFILTRATION; PERINEURAL PRN
Status: DISCONTINUED | OUTPATIENT
Start: 2024-11-20 | End: 2024-11-20

## 2024-11-20 RX ORDER — FENTANYL CITRATE 50 UG/ML
25 INJECTION, SOLUTION INTRAMUSCULAR; INTRAVENOUS EVERY 5 MIN PRN
Status: DISCONTINUED | OUTPATIENT
Start: 2024-11-20 | End: 2024-11-20 | Stop reason: HOSPADM

## 2024-11-20 RX ORDER — PROPOFOL 10 MG/ML
INJECTION, EMULSION INTRAVENOUS CONTINUOUS PRN
Status: DISCONTINUED | OUTPATIENT
Start: 2024-11-20 | End: 2024-11-20

## 2024-11-20 RX ORDER — OXYCODONE HYDROCHLORIDE 5 MG/1
5 TABLET ORAL
Status: DISCONTINUED | OUTPATIENT
Start: 2024-11-20 | End: 2024-11-20 | Stop reason: HOSPADM

## 2024-11-20 RX ORDER — AZITHROMYCIN 250 MG/1
TABLET, FILM COATED ORAL
Qty: 6 TABLET | Refills: 0 | Status: SHIPPED | OUTPATIENT
Start: 2024-11-20 | End: 2024-11-25

## 2024-11-20 RX ORDER — OMEPRAZOLE 40 MG/1
40 CAPSULE, DELAYED RELEASE ORAL DAILY
Qty: 30 CAPSULE | Refills: 0 | Status: SHIPPED | OUTPATIENT
Start: 2024-11-20

## 2024-11-20 RX ORDER — FENTANYL CITRATE 50 UG/ML
INJECTION, SOLUTION INTRAMUSCULAR; INTRAVENOUS PRN
Status: DISCONTINUED | OUTPATIENT
Start: 2024-11-20 | End: 2024-11-20

## 2024-11-20 RX ORDER — ONDANSETRON 4 MG/1
4 TABLET, ORALLY DISINTEGRATING ORAL EVERY 30 MIN PRN
Status: DISCONTINUED | OUTPATIENT
Start: 2024-11-20 | End: 2024-11-20 | Stop reason: HOSPADM

## 2024-11-20 RX ORDER — OXYCODONE HYDROCHLORIDE 10 MG/1
10 TABLET ORAL
Status: DISCONTINUED | OUTPATIENT
Start: 2024-11-20 | End: 2024-11-20 | Stop reason: HOSPADM

## 2024-11-20 RX ORDER — CEFAZOLIN SODIUM/WATER 2 G/20 ML
2 SYRINGE (ML) INTRAVENOUS SEE ADMIN INSTRUCTIONS
Status: DISCONTINUED | OUTPATIENT
Start: 2024-11-20 | End: 2024-11-20 | Stop reason: HOSPADM

## 2024-11-20 RX ORDER — FENTANYL CITRATE 50 UG/ML
50 INJECTION, SOLUTION INTRAMUSCULAR; INTRAVENOUS EVERY 5 MIN PRN
Status: DISCONTINUED | OUTPATIENT
Start: 2024-11-20 | End: 2024-11-20 | Stop reason: HOSPADM

## 2024-11-20 RX ORDER — CEFAZOLIN SODIUM/WATER 2 G/20 ML
2 SYRINGE (ML) INTRAVENOUS
Status: COMPLETED | OUTPATIENT
Start: 2024-11-20 | End: 2024-11-20

## 2024-11-20 RX ORDER — LIDOCAINE HYDROCHLORIDE 40 MG/ML
SOLUTION TOPICAL PRN
Status: DISCONTINUED | OUTPATIENT
Start: 2024-11-20 | End: 2024-11-20 | Stop reason: HOSPADM

## 2024-11-20 RX ORDER — PROPOFOL 10 MG/ML
INJECTION, EMULSION INTRAVENOUS PRN
Status: DISCONTINUED | OUTPATIENT
Start: 2024-11-20 | End: 2024-11-20

## 2024-11-20 RX ORDER — OXYCODONE HYDROCHLORIDE 5 MG/1
5 TABLET ORAL EVERY 6 HOURS PRN
Qty: 5 TABLET | Refills: 0 | Status: SHIPPED | OUTPATIENT
Start: 2024-11-20

## 2024-11-20 RX ORDER — HYDROMORPHONE HCL IN WATER/PF 6 MG/30 ML
0.2 PATIENT CONTROLLED ANALGESIA SYRINGE INTRAVENOUS EVERY 5 MIN PRN
Status: DISCONTINUED | OUTPATIENT
Start: 2024-11-20 | End: 2024-11-20 | Stop reason: HOSPADM

## 2024-11-20 RX ORDER — OXYMETAZOLINE HYDROCHLORIDE 0.05 G/100ML
SPRAY NASAL PRN
Status: DISCONTINUED | OUTPATIENT
Start: 2024-11-20 | End: 2024-11-20 | Stop reason: HOSPADM

## 2024-11-20 RX ORDER — FLUTICASONE PROPIONATE 220 UG/1
1 AEROSOL, METERED RESPIRATORY (INHALATION) 2 TIMES DAILY
Qty: 12 G | Refills: 2 | Status: SHIPPED | OUTPATIENT
Start: 2024-11-20

## 2024-11-20 RX ORDER — HYDROMORPHONE HCL IN WATER/PF 6 MG/30 ML
0.4 PATIENT CONTROLLED ANALGESIA SYRINGE INTRAVENOUS EVERY 5 MIN PRN
Status: DISCONTINUED | OUTPATIENT
Start: 2024-11-20 | End: 2024-11-20 | Stop reason: HOSPADM

## 2024-11-20 RX ADMIN — DEXMEDETOMIDINE HYDROCHLORIDE 8 MCG: 100 INJECTION, SOLUTION INTRAVENOUS at 10:25

## 2024-11-20 RX ADMIN — Medication 2 G: at 09:54

## 2024-11-20 RX ADMIN — PROPOFOL 40 MG: 10 INJECTION, EMULSION INTRAVENOUS at 10:15

## 2024-11-20 RX ADMIN — FENTANYL CITRATE 25 MCG: 50 INJECTION, SOLUTION INTRAMUSCULAR; INTRAVENOUS at 11:23

## 2024-11-20 RX ADMIN — PHENYLEPHRINE HYDROCHLORIDE 100 MCG: 10 INJECTION INTRAVENOUS at 10:05

## 2024-11-20 RX ADMIN — PHENYLEPHRINE HYDROCHLORIDE 100 MCG: 10 INJECTION INTRAVENOUS at 10:01

## 2024-11-20 RX ADMIN — ACETAMINOPHEN 975 MG: 325 TABLET, FILM COATED ORAL at 09:00

## 2024-11-20 RX ADMIN — ONDANSETRON 4 MG: 2 INJECTION INTRAMUSCULAR; INTRAVENOUS at 11:01

## 2024-11-20 RX ADMIN — PROPOFOL 30 MG: 10 INJECTION, EMULSION INTRAVENOUS at 09:47

## 2024-11-20 RX ADMIN — SUGAMMADEX 200 MG: 100 INJECTION, SOLUTION INTRAVENOUS at 10:44

## 2024-11-20 RX ADMIN — PHENYLEPHRINE HYDROCHLORIDE 100 MCG: 10 INJECTION INTRAVENOUS at 09:52

## 2024-11-20 RX ADMIN — FENTANYL CITRATE 50 MCG: 50 INJECTION INTRAMUSCULAR; INTRAVENOUS at 10:15

## 2024-11-20 RX ADMIN — MIDAZOLAM 2 MG: 1 INJECTION INTRAMUSCULAR; INTRAVENOUS at 09:35

## 2024-11-20 RX ADMIN — LIDOCAINE HYDROCHLORIDE 50 MG: 20 INJECTION, SOLUTION INFILTRATION; PERINEURAL at 09:42

## 2024-11-20 RX ADMIN — DEXMEDETOMIDINE HYDROCHLORIDE 4 MCG: 100 INJECTION, SOLUTION INTRAVENOUS at 10:11

## 2024-11-20 RX ADMIN — DEXMEDETOMIDINE HYDROCHLORIDE 8 MCG: 100 INJECTION, SOLUTION INTRAVENOUS at 10:18

## 2024-11-20 RX ADMIN — FENTANYL CITRATE 50 MCG: 50 INJECTION INTRAMUSCULAR; INTRAVENOUS at 09:42

## 2024-11-20 RX ADMIN — PROPOFOL 150 MCG/KG/MIN: 10 INJECTION, EMULSION INTRAVENOUS at 09:46

## 2024-11-20 RX ADMIN — Medication 50 MG: at 10:10

## 2024-11-20 RX ADMIN — Medication 1 LOZENGE: at 11:23

## 2024-11-20 RX ADMIN — PROPOFOL 30 MG: 10 INJECTION, EMULSION INTRAVENOUS at 09:48

## 2024-11-20 RX ADMIN — FENTANYL CITRATE 25 MCG: 50 INJECTION, SOLUTION INTRAMUSCULAR; INTRAVENOUS at 11:43

## 2024-11-20 RX ADMIN — FENTANYL CITRATE 50 MCG: 50 INJECTION, SOLUTION INTRAMUSCULAR; INTRAVENOUS at 11:33

## 2024-11-20 RX ADMIN — DEXAMETHASONE SODIUM PHOSPHATE 10 MG: 4 INJECTION, SOLUTION INTRA-ARTICULAR; INTRALESIONAL; INTRAMUSCULAR; INTRAVENOUS; SOFT TISSUE at 09:46

## 2024-11-20 RX ADMIN — PROPOFOL 100 MG: 10 INJECTION, EMULSION INTRAVENOUS at 09:46

## 2024-11-20 RX ADMIN — PROPOFOL 40 MG: 10 INJECTION, EMULSION INTRAVENOUS at 10:38

## 2024-11-20 RX ADMIN — SODIUM CHLORIDE, POTASSIUM CHLORIDE, SODIUM LACTATE AND CALCIUM CHLORIDE: 600; 310; 30; 20 INJECTION, SOLUTION INTRAVENOUS at 09:38

## 2024-11-20 ASSESSMENT — ACTIVITIES OF DAILY LIVING (ADL)
ADLS_ACUITY_SCORE: 0

## 2024-11-20 NOTE — OP NOTE
Operative Note   Otolaryngology - Head and Neck Surgery       DATE OF OPERATION:   November 20, 2024    PREOPERATIVE DIAGNOSIS:   Subglottic laryngeal stenosis.      POSTOPERATIVE DIAGNOSIS:   Same    NAME OF OPERATION:   1. Flexible bronchoscopy with CO2 laser of subglottic stenosis.   2. Flexible bronchoscopy with balloon dilation of subglottic stenosis to 13.5 mmHg   3. Microlaryngoscopy with cold scissors incisions of subglottic stenosis  4. Microlaryngoscopy with balloon dilation of subglottic stenosis to 15 mmHg   5. Microlaryngoscopy with injection of kenalog steroid to subglottic larynx.  6. Flexible bronchoscopy with therapeutic suction    ANESTHESIA  Type: General   LMA    SURGEON:   Haylie Polo MD    ASSISTANT SURGEON(S):   Eunice Jovel MD    INDICATIONS FOR PROCEDURE:   The patient is a 44 year old female with a history of subglottic and tracheal stenosis. she is being brought to the Operating Room for subsequent treatment of the stenosis. She had a prior surgery on 4/19/23. Risks, benefits, alternatives, and rationale for the procedure(s) listed above were discussed with the patient, and the patient wishes to proceed with surgery and has signed an informed consent.     COMPLEXITY  This surgery was more complex than normally because of performing the surgery with history of obesity and thick scarring. The approach therefore was more difficult technically than normally.  Significantly more time was required to perform all parts of the operation, especially removing the scar with both flexible and rigid approaches.     FINDINGS:   1. First part of the procedure done via LMA with spontaneous ventilation, second part was done with exposure with  5.0 ett and apneic ventilation  2. Subglottic stenosis: 0.5 distal to the vocal folds, 2.0 cm thick, grade 3, 75 % narrowing, scar bridge right lateral and posterior   3. CRE balloon to 15 mmHg       DESCRIPTION OF PROCEDURE:   The patient was brought into the  operating room and placed supine on the operating room table. A time-out was performed. General anesthesia was induced. The patient was mask ventilated. Then the patient was ventilated with a LMA.     The head of the bed was turned 90 degrees to the right. A flexible bronchoscope was placed through the LMA. 4% LTA was infused over the glottic entry. Once the topical anesthesia had been placed, the flexible bronchoscope was placed through the vocal cords. The patient had evidence of approximately grade 3, 75% subglottic and tracheal narrowing. There was a scar bridge right lateral and posterior      The procedure began with first performing CO2 laser. The flexible waveguide CO2 laser was threaded through the bronchoscope. A laser time-out was performed. The patient's face and eyes were protected with moist towels. The oxygen was subsequently reduced to less than 30%. Radial cuts were made within the scar band starting anteriorly through the scar bridge and then more cuts were made laterally and posteriorly.     The patient was then allowed to obtain adequate saturation and the balloon dilator was then placed through the flexible bronchoscope. Dilations were performed to 13.5 mmHg. Once the subglottic area had been adequately dilated, the patient was once again allowed to obtain 100% saturation.     Then the  LMA was removed and the dentition and mucosa were inspected prior to start. A reinforced tooth guard was placed on the upper dentition. The ossoff laryngoscope was carefully introduced into the oral cavity and gently passed to the oropharynx. Here the glottis was exposed and the patient was placed in suspension.      The 5.0 ETT was placed and ventilation with good chest rise was confirmed.     Then straight and up scissors were used to incise the remaining scar bands.     Then the CRE balloon dilator was brought into the field and dilations were performed to 15 mmHg.     Then 1.0 ml of kenalog 40 steroid  was  injected into the scar.        The subglottis was now fully open. This was the end of our procedure. Afrin soaked pledgets were applied to the surgical site for hemostasis.  The surgical site was then inspected and no residual bleeding was seen. The patient was taken out of suspension. The instrumentation was carefully removed, examining the mucosa and dentition on the way out. The dental guard was removed, and the mucosa and dentition were seen to be in their preoperative state at the conclusion of the procedure. The patient was then handed back to the care of anesthesia who awoke the patient without complication. The patient was then transferred to the PACU.     COMPLICATIONS:   None.    ESTIMATED BLOOD LOSS:   Less than 10cc    DISPOSITION:   PACU.    SPECIMENS:  * No specimens in log *       PHOTODOCUMENTATION:

## 2024-11-20 NOTE — ANESTHESIA CARE TRANSFER NOTE
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    Anesthesia Type:   General     Note:    Oropharynx: oropharynx clear of all foreign objects and spontaneously breathing  Level of Consciousness: drowsy and awake  Oxygen Supplementation: face mask  Level of Supplemental Oxygen (L/min / FiO2): 6  Independent Airway: airway patency satisfactory and stable  Dentition: dentition unchanged  Vital Signs Stable: post-procedure vital signs reviewed and stable  Report to RN Given: handoff report given  Patient transferred to: PACU    Handoff Report: Identifed the Patient, Identified the Reponsible Provider, Reviewed the pertinent medical history, Discussed the surgical course, Reviewed Intra-OP anesthesia mangement and issues during anesthesia, Set expectations for post-procedure period and Allowed opportunity for questions and acknowledgement of understanding      Vitals:  Vitals Value Taken Time   /65 11/20/24 1106   Temp     Pulse 83 11/20/24 1112   Resp 20 11/20/24 1112   SpO2 99 % 11/20/24 1112   Vitals shown include unfiled device data.    Electronically Signed By: Vj GARRIDO Ma, MD  November 20, 2024  11:12 AM

## 2024-11-20 NOTE — ANESTHESIA POSTPROCEDURE EVALUATION
Patient: Anuradha Brown    Procedure: Procedure(s):  MICROLARYNGOSCOPY, flexible bronchoscopy, carbon dioxide laser resection of stenosis, CRE balloon dilation,  steroid injection       Anesthesia Type:  General    Note:  Disposition: Outpatient   Postop Pain Control: Uneventful            Sign Out: Well controlled pain   PONV: No   Neuro/Psych: Uneventful            Sign Out: Acceptable/Baseline neuro status   Airway/Respiratory: Uneventful            Sign Out: Acceptable/Baseline resp. status   CV/Hemodynamics: Uneventful            Sign Out: Acceptable CV status; No obvious hypovolemia; No obvious fluid overload   Other NRE: NONE   DID A NON-ROUTINE EVENT OCCUR? No           Last vitals:  Vitals Value Taken Time   BP 97/63 11/20/24 1145   Temp 36.7  C (98  F) 11/20/24 1110   Pulse 68 11/20/24 1147   Resp 11 11/20/24 1147   SpO2 92 % 11/20/24 1147   Vitals shown include unfiled device data.    Electronically Signed By: Toñito Nieto MD  November 20, 2024  11:48 AM

## 2024-11-20 NOTE — ANESTHESIA PROCEDURE NOTES
Airway       Patient location during procedure: OR  Staff -        Anesthesiologist:  Toñito Nieto MD       Resident/Fellow: Vj Funez MD       Performed By: resident and with residents       Procedure performed by resident/fellow/CRNA in presence of a teaching physician.    Consent for Airway        Urgency: elective  Indications and Patient Condition       Indications for airway management: christiano-procedural       Induction type:intravenous       Mask difficulty assessment: 1 - vent by mask    Final Airway Details       Final airway type: supraglottic airway    Supraglottic Airway Details        Type: LMA       Brand: I-Gel       LMA size: 3    Post intubation assessment        Placement verified by: capnometry, equal breath sounds and chest rise        Number of attempts at approach: 1       Number of other approaches attempted: 0       Secured with: tape       Ease of procedure: easy       Dentition: Intact

## 2024-11-20 NOTE — DISCHARGE INSTRUCTIONS
Contacting your Doctor -   To contact a doctor, call Dr. Polo   Office: [phone] and ask for the resident on call for   Otolaryngology (answered 24 hours a day)   Emergency Department:  [facility]: [phone]  [facility]: [phone] 911 if you are in need of immediate or emergent help

## 2024-11-21 ENCOUNTER — TELEPHONE (OUTPATIENT)
Dept: OTOLARYNGOLOGY | Facility: CLINIC | Age: 44
End: 2024-11-21
Payer: COMMERCIAL

## 2024-11-21 NOTE — TELEPHONE ENCOUNTER
Called patient to check in post-operatively. Per patient her pain is better and she able to manage it with tylenol. Per patient she is doing well with mild pain. Patient will reach out if her pain increased or if she has any new questions or concerns. Patient was agreeable and verbalized understanding of the situation. Suzanne Jain RN on 11/21/2024 at 10:19 AM

## 2024-12-18 DIAGNOSIS — E11.9 TYPE 2 DIABETES MELLITUS WITHOUT COMPLICATION, WITHOUT LONG-TERM CURRENT USE OF INSULIN (H): ICD-10-CM

## 2024-12-18 RX ORDER — TIRZEPATIDE 12.5 MG/.5ML
INJECTION, SOLUTION SUBCUTANEOUS
Qty: 2 ML | Refills: 3 | Status: SHIPPED | OUTPATIENT
Start: 2024-12-18

## 2024-12-19 ENCOUNTER — OFFICE VISIT (OUTPATIENT)
Dept: OTOLARYNGOLOGY | Facility: CLINIC | Age: 44
End: 2024-12-19
Payer: COMMERCIAL

## 2024-12-19 VITALS
OXYGEN SATURATION: 98 % | DIASTOLIC BLOOD PRESSURE: 64 MMHG | HEART RATE: 80 BPM | WEIGHT: 189 LBS | HEIGHT: 60 IN | BODY MASS INDEX: 37.11 KG/M2 | SYSTOLIC BLOOD PRESSURE: 105 MMHG

## 2024-12-19 DIAGNOSIS — R49.0 DYSPHONIA: Primary | ICD-10-CM

## 2024-12-19 DIAGNOSIS — J38.6 SGS (SUBGLOTTIC STENOSIS): ICD-10-CM

## 2024-12-19 RX ORDER — FLUTICASONE PROPIONATE 220 UG/1
2 AEROSOL, METERED RESPIRATORY (INHALATION) 2 TIMES DAILY
Qty: 12 G | Refills: 6 | Status: SHIPPED | OUTPATIENT
Start: 2024-12-19 | End: 2025-01-18

## 2024-12-19 NOTE — PATIENT INSTRUCTIONS
1.  You were seen in the ENT Clinic today by Dr. Polo. If you have any questions or concerns after your appointment, please call [phone]. Press option #1 for scheduling related needs. Press option #3 for Nurse advice.    2.  Dr. Polo has recommended the following:   - Flovent inhaler 2 puffs once daily   - Saline nebulizer daily or more as needed    - Peak flow meter readings monthly   - Call in if peak flow meter reaches below 300   - Voice therapy    3.  Plan is to return to clinic 6 months    How to Contact Us:  Send a [employer] message to your provider. Our team will respond to you via [employer]. Occasionally, we will need to call you to get further information.  For urgent matters (Monday-Friday, 8:00 AM-3:30 PM), call the ENT Clinic: [phone] and speak with a call center team member - they will route your call appropriately.   If you'd like to speak directly with a nurse, please call [phone]. We do our best to check voicemail frequently throughout the day, and will work to call you back within 1-2 days. For urgent matters, please use the general clinic phone numbers listed above.      Suzanne Ochoa  [phone]  [facility] - Otolaryngology

## 2024-12-19 NOTE — LETTER
2024       RE: Anuradha Brown  [address] [facility] [address]     Dear Colleague,    Thank you for referring your patient, Anuradha Brown, to the [facility] [place] at [facility]. Please see a copy of my visit note below.        [facility]   at the [facility]   Otolaryngology Clinic     Patient: Anuradha Brown    MRN: [id number]    : 1980    Age/Gender: 44 year old female  Date of Service: 2024  Rendering Provider:   Haylie Polo MD         Impression & Plan     IMPRESSION: Ms. Brown is a 44 year old female who is being seen for the following:      Dyspnea  - due to subglottic stenosis   - no intubation history   - has diabetes, on Ozempic, most recent A1C is 6.5 (22)  - denies history of autoimmune disease  - CT chest 23 normal  - PFTs 23 is abnormal, stridor heard on exam  - denies reflux   - BMI is 40  - patient symptoms with shortness of breath with exertion and prolonged talking, one severe episode woke her from sleep and resulted in ER visit, on steroid inhalers but doesn't help  - scope shows grade 3 80% stenosis  - discussed etiology of trauma (intubation), autoimmune, diabetes or idiopathic. In this case this is most likely idiopathic given no prior intubation, but also has component of diabetes, need to rule out autoimmune issues  - discussed treatment with observation, conservative management with oral abx/steroids/reflux precautions, steroid injections, endoscopic procedures, open resection and bypass procedure with tracheotomy  - patient elects to proceed with endoscopic surgery  - s/p CO2 laser, CRE balloon dilation, kenalog injection 23  - symptoms 2023 are improved breathing with average peak flow of 300 and max of 350, peak flow today is 350, has mucus sensation with need to cough and throat clear, hoarse voice with prolonged  "talking, walked up 3 flights of stairs yesterday  - scope shows grade 1 5% narrowing, mucus in subglottic space  - given mucus sensation, recommend nebulizer or Mucinex, has daughter's nebulizer  - recommend voice therapy  - discussed observation with increased physical activity/weight management and close monitoring of diabetes versus conservative management with oral abx/steroids/reflux precautions versus steroid injections  - patient elects observation   - symptoms 9/21/2023 are stable, peak flow is 350, still has phlegm, still working on glucose, not checking too often  - scope shows stable subglottic opening, grade 1, 5% narrowing, mucus   - symptoms 7/9/2024 are stable, peak flow is still 350, not noticing more difficulty  - scope shows grade 1, 30% narrowing  - discussed options of observation, steroid inhaler, steroid injection  - she will think about her options and let me know   - has a nebulizer through her daughters  - symptoms 11/4/2024 are worse. Difficulty breathing with stairs and some talking, feels more mucus. Peak flow between 250 and 300.   - has been using the nebulizer  - scope shows grade 1, 40% narrowing  - had patient work with therapist to rule out vocal fold dysfunction, pt does not have vocal fold syfunction, but has sig strider  - discussed would recommend endoscopic surgery at this given severity of symptoms  - risks include but not limited to bleeding, infection, reaction to the anesthesia, damage to adjacent structures lips, teeth, tongue, larynx, pharynx, \"numb tongue, altered taste, TMJ syndrome, dental injury\", hoarseness, breathing, or swallowing problems  - given a laser can be used risk of an airway fire is also possible  - risk from the dilation include tracheal tear   - s/p MDL, bronch, CO2 laser, resection of stenosis, CRE balloon dilation to 15mmHg, steroid injection 11/20/24  - symptoms 12/19/2024 are improved in terms of her breathing, peak flow meter 350 today, voice is " still bothersome  - scope shows grade 1, 5% narrowing with mild mucus in the subglottis  - discussed maintenance option of observation, timed follow up, long term antibiotic treatment, steroid inhaler, reflux control and steroid injections  - patient concerned about steroid injection, facebook group with stories about complications   - dropped HgA1c to 5.7 and BMI down to 36 - discussed these are very important and helpful results  - patient elects to proceed with steroid inhaler, she did not use this post surgery she thought this was only if she had breathing problems    Plan  - restart using saline nebulizer for the mucus plugs  - steroid inhaler 2 puffs once a day  - do peak flow meter monthly, if it drops below 300 before the next appointment, patient will reach out      2. Dysphonia  -  ongoing for a while    - speaking voice is affected  - no pain with voice use  - voice demand is high  - voice goes away with increased talking  - strobe shows supraglottic hyperfunction left more than right, medial edge thickening with hypervascularity on the left  - discussed symptoms 12/19/24 due to mid edge vocal fold thickening with secondary muscle tension  Plan  - voice therapy      RETURN VISIT: 6 months    Chief Complaint   Subglottic stenosis  Dyspnea  S/p CO2 laser, CRE balloon dilation, kenalog injection 4/19/23  S/p microlaryngoscopy, flex bronch, CO2 laser, resection of stenosis, CRE balloon dilation, steroid injection 11/20/24  Interval History   HISTORY OF PRESENT ILLNESS: Ms. Brown is a 42 year old female is being followed for dyspnea. She was initially seen on 3/28/23. Please refer to this note for full history.     Today, she presents for follow up. she reports:  - when she talks a lot, she loses her voice  - in the middle of telling a story, she her voice cut out    Dysphonia:  reports    Dysphagia:  denies     Dyspnea:  reports    Coughing / Throat-clearing:  denies    GERD/LPRD:  denies     PAST MEDICAL  HISTORY:   Past Medical History:   Diagnosis Date     Arthritis      Diabetes (H) 11/2021     Other and unspecified ovarian cyst 09/23/1998    ultrasound done     Uncomplicated asthma 11/2021       PAST SURGICAL HISTORY:   Past Surgical History:   Procedure Laterality Date     BRONCHOSCOPY FLEXIBLE N/A 4/19/2023    Procedure: Bronchoscopy flexible and biopsy;  Surgeon: Haylie Polo MD;  Location: [facility] OR     INJECT STEROID (LOCATION) N/A 4/19/2023    Procedure: steroid injection;  Surgeon: Haylie Polo MD;  Location: [facility] OR     INJECT STEROID (LOCATION) N/A 11/20/2024    Procedure: steroid injection;  Surgeon: Haylie Polo MD;  Location: [facility] OR     LASER CO2 LARYNGOSCOPY N/A 4/19/2023    Procedure: carbon dioxide laser resection of stenosis, CRE balloon dilation;  Surgeon: Haylie Polo MD;  Location: [facility] OR     LASER CO2 LARYNGOSCOPY, COMPLEX N/A 11/20/2024    Procedure: MICROLARYNGOSCOPY, flexible bronchoscopy, carbon dioxide laser resection of stenosis, CRE balloon dilation,;  Surgeon: Haylie Polo MD;  Location: [facility] OR       CURRENT MEDICATIONS:   Current Outpatient Medications:      ACCU-CHEK GUIDE test strip, test TWICE DAILY, Disp: 200 strip, Rfl: 3     beclomethasone HFA (QVAR REDIHALER) 80 MCG/ACT inhaler, Inhale 1 puff into the lungs 2 times daily., Disp: 10.6 g, Rfl: 0     blood glucose monitoring (SOFTCLIX) lancets, Use to test blood sugar 1x  times daily or as directed., Disp: 200 each, Rfl: 3     fluticasone (FLOVENT HFA) 220 MCG/ACT inhaler, Inhale 1 puff into the lungs 2 times daily., Disp: 12 g, Rfl: 2     levonorgestrel (MIRENA) 52 MG (20 mcg/day) IUD, by Intrauterine route once., Disp: , Rfl:      MOUNJARO 12.5 MG/0.5ML SOAJ, Inject 12.5 mg subcutaneously every 7 days, Disp: 2 mL, Rfl: 3     omeprazole (PRILOSEC) 40 MG DR capsule, Take 1 capsule (40 mg) by mouth daily., Disp: 30 capsule, Rfl: 0     oxyCODONE (ROXICODONE) 5 MG tablet, Take 1 tablet (5 mg) by mouth every 6 hours as needed for pain.,  Disp: 5 tablet, Rfl: 0     STATIN NOT PRESCRIBED (INTENTIONAL), Please choose reason not prescribed from choices below. Patient wants to wait a year. (Patient not taking: Reported on 12/19/2024), Disp: , Rfl:     ALLERGIES: No known drug allergy    SOCIAL HISTORY:    Social History     Socioeconomic History     Marital status:      Spouse name: Not on file     Number of children: Not on file     Years of education: Not on file     Highest education level: Not on file   Occupational History     Not on file   Tobacco Use     Smoking status: Never     Passive exposure: Never     Smokeless tobacco: Never   Vaping Use     Vaping status: Never Used   Substance and Sexual Activity     Alcohol use: Yes     Comment: once a month     Drug use: Never     Sexual activity: Yes     Partners: Male     Birth control/protection: None     Comment: I want to talk about getting an ablation.  cramps bad   Other Topics Concern     Parent/sibling w/ CABG, MI or angioplasty before 65F 55M? No   Social History Narrative     Not on file     Social Drivers of Health     Financial Resource Strain: Low Risk  (1/2/2024)    Financial Resource Strain      Within the past 12 months, have you or your family members you live with been unable to get utilities (heat, electricity) when it was really needed?: No   Food Insecurity: Low Risk  (1/2/2024)    Food Insecurity      Within the past 12 months, did you worry that your food would run out before you got money to buy more?: No      Within the past 12 months, did the food you bought just not last and you didn t have money to get more?: No   Transportation Needs: Low Risk  (1/2/2024)    Transportation Needs      Within the past 12 months, has lack of transportation kept you from medical appointments, getting your medicines, non-medical meetings or appointments, work, or from getting things that you need?: No   Physical Activity: Not on file   Stress: Not on file   Social Connections: Not on file    Interpersonal Safety: Low Risk  (11/20/2024)    Interpersonal Safety      Do you feel physically and emotionally safe where you currently live?: Yes      Within the past 12 months, have you been hit, slapped, kicked or otherwise physically hurt by someone?: No      Within the past 12 months, have you been humiliated or emotionally abused in other ways by your partner or ex-partner?: No   Housing Stability: Low Risk  (1/2/2024)    Housing Stability      Do you have housing? : Yes      Are you worried about losing your housing?: No         FAMILY HISTORY:   Family History   Problem Relation Age of Onset     Diabetes Maternal Half-Brother      Thyroid Disease Maternal Half-Brother      Thyroid Disease Maternal Half-Sister       Non-contributory for problems with anesthesia    REVIEW OF SYSTEMS:   The patient was asked a 14 point review of systems regarding constitutional symptoms, eye symptoms, ears, nose, mouth, throat symptoms, cardiovascular symptoms, respiratory symptoms, gastrointestinal symptoms, genitourinary symptoms, musculoskeletal symptoms, integumentary symptoms, neurological symptoms, psychiatric symptoms, endocrine symptoms, hematologic/lymphatic symptoms, and allergic/ immunologic symptoms.   The pertinent factors have been included in the HPI and below.  Patient Supplied Answers to Review of Systems      3/28/2023     1:49 PM    ENT ROS   Psychology Frequently feeling anxious   Cardiopulmonary Cough    Breathing problems    Wheezing       Physical Examination   The patient underwent a physical examination as described below. The pertinent positive and negative findings are summarized after the description of the examination.  Constitutional: The patient's developmental and nutritional status was assessed. The patient's voice quality was assessed.  Head and Face: The head and face were inspected for deformities. The sinuses were palpated. The salivary glands were palpated. Facial muscle strength was  assessed bilaterally.  Eyes: Extraocular movements and primary gaze alignment were assessed.  Ears, Nose, Mouth and Throat: The ears and nose were examined for deformities. The nasal septum, mucosa, and turbinates were inspected by anterior rhinoscopy. The lips, teeth, and gums were examined for abnormalities. The oral mucosa, tongue, palate, tonsils, lateral and posterior pharynx were inspected for the presence of asymmetry or mucosal lesions.    Neck: The tracheal position was noted, and the neck mass palpated to determine if there were any asymmetries, abnormal neck masses, thyromegally, or thyroid nodules.  Respiratory: The nature of the breathing and chest expansion/symmetry was observed.  Cardiovascular: The patient was examined to determine the presence of any edema or jugular venous distension.  Abdomen: The contour of the abdomen was noted.  Lymphatic: The patient was examined for infraclavicular lymphadenopathy.  Musculoskeletal: The patient was inspected for the presence of skeletal deformities.  Extremities: The extremities were examined for any clubbing or cyanosis.  Skin: The skin was examined for inflammatory or neoplastic conditions.  Neurologic: The patient's orientation, mood, and affect were noted. The cranial nerve  functions were examined.  Other pertinent positive and negative findings on physical examination:   OC/OP: no lesions, uvula midline, soft palate elevates symmetrically   Neck: no lesions, no TH tenderness to palpation    All other physical examination findings were within normal limits and noncontributory.    Procedures   Video Laryngoscopy with Stroboscopy (CPT 92874)    Preoperative Diagnosis:  Dysphonia and throat symptoms  Postoperative Diagnosis: Dysphonia and throat symptoms  Indication:  Patient has new or persistent dysphonia and throat symptoms.  This requires evaluation by stroboscopy to fully delineate the laryngeal functioning; especially mucosal wave assessment,  ultrasharp visualization of lesions on the vocal folds, and overall functioning of the larynx.  Details of Procedure: A 70 degree rigid telescopic laryngoscope or a distal chip flexible scope was used. The lighting was obtained via a light cable connected to a stroboscopic unit. The telescope was inserted either transorally or transnasally until the vocal folds could be visualized. The patient was instructed to sustain the vowel  ee  at a comfortable pitch and loudness as the voice was monitored by a microphone connected to a fundamental frequency tracker. This circuit tracked vocal periodicity, allowing the light to flash in synchrony with the glottal cycles. A setting on the stroboscope was set to change the phase of light strobing with relation to the vocal fundamental frequency, producing an image of 1 to 2 glottal cycles every second. The video images were recorded for analysis. Use of the variable speed, slow and stop scan allowed careful study of pertinent segments of laryngeal function to increase accuracy of clinical assessments of function and dysfunction.  In particular, features of glottal closure, mucosal wave on the vocal fold cover and laryngeal symmetry were analyzed. Lastly, the patient was asked to phonate speech samples and auditory/perceptual evaluation of voice and resonance were performed.  The vocal quality was carefully evaluated for hoarseness, breathiness, loudness, phrase length and intelligibility to determine the source of dysphonia.    Findings:      B. LARYNGOVIDEOSTROBOSCOPY   Anatomic/Physiological Deviations:  RNC, grade 1, 5% narrowing with mild mucus in the subglottis  Mucosal wave: Right:  No restriction     Left: No restriction  Bilateral Vocal Fold Vibration: Symmetric  Vocal Process: Right: No restriction    Left:  No restriction  Vocal Fold closure: Complete glottal closure    Complication(s): None  Disposition: Patient tolerated the procedure well          Review of Relevant  "Clinical Data   I personally reviewed:    Labs:  Lab Results   Component Value Date    TSH 1.06 08/13/2021     Lab Results   Component Value Date     11/15/2024    CO2 26 11/15/2024    BUN 17.2 11/15/2024     Lab Results   Component Value Date    WBC 5.8 11/15/2024    HGB 14.5 11/15/2024    HCT 44.1 11/15/2024    MCV 90 11/15/2024     11/15/2024     No results found for: \"PT\", \"PTT\", \"INR\"  No results found for: \"DRAKE\"  No components found for: \"RHEUMATOIDFACTOR\", \"RF\"  No results found for: \"CRP\"  No components found for: \"CKTOT\", \"URICACID\"  No components found for: \"C3\", \"C4\", \"DSDNAAB\", \"NDNAABIFA\"  No results found for: \"MPOAB\"    Patient reported Quality of Life (QOL) Measures   Patient Supplied Answers To VHI Questionnaire      8/7/2023     7:52 AM   Voice Handicap Index (VHI-10)   My voice makes it difficult for people to hear me 2   People have difficulty understanding me in a noisy room 2   My voice difficulties restrict my personal and social life.  1   I feel left out of conversations because of my voice 1   My voice problem causes me to lose income 0   I feel as though I have to strain to produce voice 2   The clarity of my voice is unpredictable 2   My voice problem upsets me 2   My voice makes me feel handicapped 0   People ask, \"What's wrong with your voice?\" 1   VHI-10 13         Patient Supplied Answers To EAT Questionnaire      3/28/2023     2:02 PM   Eating Assessment Tool (EAT-10)   My swallowing problem has caused me to lose weight 0    My swallowing problem interferes with my ability to go out for meals 0    Swallowing liquids takes extra effort 0    Swallowing solids takes extra effort 0    Swallowing pills takes extra effort 0    Swallowing is painful 0    The pleasure of eating is affected by my swallowing 0    When I swallow food sticks in my throat 0    I cough when I eat 0    Swallowing is stressful 0    EAT-10 0       Proxy-reported         Patient Supplied Answers To CSI " Questionnaire      11/6/2024     9:50 AM   Cough Severity Index (CSI)   My cough is worse when I lie down 0   My coughing problem causes me to restrict my personal and social life 0   I tend to avoid places because of my cough problem 0   I feel embarrassed because of my coughing problem 2   People ask, ''What's wrong?'' because I cough a lot 0   I run out of air when I cough 0   My coughing problem affects my voice 2   My coughing problem limits my physical activity 0   My coughing problem upsets me 2   People ask me if I am sick because I cough a lot 0   CSI Score 6        Patient-reported           Scribe Disclosure:   I, LESLI COFFEY, am serving as a scribe; to document services personally performed by Haylie Polo MD -based on data collection and the provider's statements to me.     Provider Disclosure:  I agree with above History, Review of Systems, Physical exam and Plan.  I have reviewed the content of the documentation and have edited it as needed. I have personally performed the services documented here and the documentation accurately represents those services and the decisions I have made.      Electronically signed by:  Haylie Polo MD    Laryngology    [facility]  Department of  Otolaryngology - Head and Neck Surgery  Clinics & [place] [address]  Appointment line: [phone]  Fax: [phone]  [email or web address]       Again, thank you for allowing me to participate in the care of your patient.      Sincerely,    Haylie Polo MD

## 2024-12-19 NOTE — PROGRESS NOTES
[facility]   at the [facility]   Otolaryngology Clinic     Patient: Anuradha Brown    MRN: [id number]    : 1980    Age/Gender: 44 year old female  Date of Service: 2024  Rendering Provider:   Haylie Polo MD         Impression & Plan     IMPRESSION: Ms. Brown is a 44 year old female who is being seen for the following:      Dyspnea  - due to subglottic stenosis   - no intubation history   - has diabetes, on Ozempic, most recent A1C is 6.5 (22)  - denies history of autoimmune disease  - CT chest 23 normal  - PFTs 23 is abnormal, stridor heard on exam  - denies reflux   - BMI is 40  - patient symptoms with shortness of breath with exertion and prolonged talking, one severe episode woke her from sleep and resulted in ER visit, on steroid inhalers but doesn't help  - scope shows grade 3 80% stenosis  - discussed etiology of trauma (intubation), autoimmune, diabetes or idiopathic. In this case this is most likely idiopathic given no prior intubation, but also has component of diabetes, need to rule out autoimmune issues  - discussed treatment with observation, conservative management with oral abx/steroids/reflux precautions, steroid injections, endoscopic procedures, open resection and bypass procedure with tracheotomy  - patient elects to proceed with endoscopic surgery  - s/p CO2 laser, CRE balloon dilation, kenalog injection 23  - symptoms 2023 are improved breathing with average peak flow of 300 and max of 350, peak flow today is 350, has mucus sensation with need to cough and throat clear, hoarse voice with prolonged talking, walked up 3 flights of stairs yesterday  - scope shows grade 1 5% narrowing, mucus in subglottic space  - given mucus sensation, recommend nebulizer or Mucinex, has daughter's nebulizer  - recommend voice therapy  - discussed observation with increased physical activity/weight management and close monitoring of diabetes  "versus conservative management with oral abx/steroids/reflux precautions versus steroid injections  - patient elects observation   - symptoms 9/21/2023 are stable, peak flow is 350, still has phlegm, still working on glucose, not checking too often  - scope shows stable subglottic opening, grade 1, 5% narrowing, mucus   - symptoms 7/9/2024 are stable, peak flow is still 350, not noticing more difficulty  - scope shows grade 1, 30% narrowing  - discussed options of observation, steroid inhaler, steroid injection  - she will think about her options and let me know   - has a nebulizer through her daughters  - symptoms 11/4/2024 are worse. Difficulty breathing with stairs and some talking, feels more mucus. Peak flow between 250 and 300.   - has been using the nebulizer  - scope shows grade 1, 40% narrowing  - had patient work with therapist to rule out vocal fold dysfunction, pt does not have vocal fold syfunction, but has sig strider  - discussed would recommend endoscopic surgery at this given severity of symptoms  - risks include but not limited to bleeding, infection, reaction to the anesthesia, damage to adjacent structures lips, teeth, tongue, larynx, pharynx, \"numb tongue, altered taste, TMJ syndrome, dental injury\", hoarseness, breathing, or swallowing problems  - given a laser can be used risk of an airway fire is also possible  - risk from the dilation include tracheal tear   - s/p MDL, bronch, CO2 laser, resection of stenosis, CRE balloon dilation to 15mmHg, steroid injection 11/20/24  - symptoms 12/19/2024 are improved in terms of her breathing, peak flow meter 350 today, voice is still bothersome  - scope shows grade 1, 5% narrowing with mild mucus in the subglottis  - discussed maintenance option of observation, timed follow up, long term antibiotic treatment, steroid inhaler, reflux control and steroid injections  - patient concerned about steroid injection, facebook group with stories about " complications   - dropped HgA1c to 5.7 and BMI down to 36 - discussed these are very important and helpful results  - patient elects to proceed with steroid inhaler, she did not use this post surgery she thought this was only if she had breathing problems    Plan  - restart using saline nebulizer for the mucus plugs  - steroid inhaler 2 puffs once a day  - do peak flow meter monthly, if it drops below 300 before the next appointment, patient will reach out      2. Dysphonia  -  ongoing for a while    - speaking voice is affected  - no pain with voice use  - voice demand is high  - voice goes away with increased talking  - strobe shows supraglottic hyperfunction left more than right, medial edge thickening with hypervascularity on the left  - discussed symptoms 12/19/24 due to mid edge vocal fold thickening with secondary muscle tension  Plan  - voice therapy      RETURN VISIT: 6 months    Chief Complaint   Subglottic stenosis  Dyspnea  S/p CO2 laser, CRE balloon dilation, kenalog injection 4/19/23  S/p microlaryngoscopy, flex bronch, CO2 laser, resection of stenosis, CRE balloon dilation, steroid injection 11/20/24  Interval History   HISTORY OF PRESENT ILLNESS: Ms. Brown is a 42 year old female is being followed for dyspnea. She was initially seen on 3/28/23. Please refer to this note for full history.     Today, she presents for follow up. she reports:  - when she talks a lot, she loses her voice  - in the middle of telling a story, she her voice cut out    Dysphonia:  reports    Dysphagia:  denies     Dyspnea:  reports    Coughing / Throat-clearing:  denies    GERD/LPRD:  denies     PAST MEDICAL HISTORY:   Past Medical History:   Diagnosis Date    Arthritis     Diabetes (H) 11/2021    Other and unspecified ovarian cyst 09/23/1998    ultrasound done    Uncomplicated asthma 11/2021       PAST SURGICAL HISTORY:   Past Surgical History:   Procedure Laterality Date    BRONCHOSCOPY FLEXIBLE N/A 4/19/2023    Procedure:  Bronchoscopy flexible and biopsy;  Surgeon: Haylie Polo MD;  Location: [facility] OR    INJECT STEROID (LOCATION) N/A 4/19/2023    Procedure: steroid injection;  Surgeon: Haylie Polo MD;  Location: [facility] OR    INJECT STEROID (LOCATION) N/A 11/20/2024    Procedure: steroid injection;  Surgeon: Haylie Polo MD;  Location: [facility] OR    LASER CO2 LARYNGOSCOPY N/A 4/19/2023    Procedure: carbon dioxide laser resection of stenosis, CRE balloon dilation;  Surgeon: Haylie Polo MD;  Location: [facility] OR    LASER CO2 LARYNGOSCOPY, COMPLEX N/A 11/20/2024    Procedure: MICROLARYNGOSCOPY, flexible bronchoscopy, carbon dioxide laser resection of stenosis, CRE balloon dilation,;  Surgeon: Haylie Polo MD;  Location: [facility] OR       CURRENT MEDICATIONS:   Current Outpatient Medications:     ACCU-CHEK GUIDE test strip, test TWICE DAILY, Disp: 200 strip, Rfl: 3    beclomethasone HFA (QVAR REDIHALER) 80 MCG/ACT inhaler, Inhale 1 puff into the lungs 2 times daily., Disp: 10.6 g, Rfl: 0    blood glucose monitoring (SOFTCLIX) lancets, Use to test blood sugar 1x  times daily or as directed., Disp: 200 each, Rfl: 3    fluticasone (FLOVENT HFA) 220 MCG/ACT inhaler, Inhale 1 puff into the lungs 2 times daily., Disp: 12 g, Rfl: 2    levonorgestrel (MIRENA) 52 MG (20 mcg/day) IUD, by Intrauterine route once., Disp: , Rfl:     MOUNJARO 12.5 MG/0.5ML SOAJ, Inject 12.5 mg subcutaneously every 7 days, Disp: 2 mL, Rfl: 3    omeprazole (PRILOSEC) 40 MG DR capsule, Take 1 capsule (40 mg) by mouth daily., Disp: 30 capsule, Rfl: 0    oxyCODONE (ROXICODONE) 5 MG tablet, Take 1 tablet (5 mg) by mouth every 6 hours as needed for pain., Disp: 5 tablet, Rfl: 0    STATIN NOT PRESCRIBED (INTENTIONAL), Please choose reason not prescribed from choices below. Patient wants to wait a year. (Patient not taking: Reported on 12/19/2024), Disp: , Rfl:     ALLERGIES: No known drug allergy    SOCIAL HISTORY:    Social History     Socioeconomic History    Marital status:       Spouse name: Not on file    Number of children: Not on file    Years of education: Not on file    Highest education level: Not on file   Occupational History    Not on file   Tobacco Use    Smoking status: Never     Passive exposure: Never    Smokeless tobacco: Never   Vaping Use    Vaping status: Never Used   Substance and Sexual Activity    Alcohol use: Yes     Comment: once a month    Drug use: Never    Sexual activity: Yes     Partners: Male     Birth control/protection: None     Comment: I want to talk about getting an ablation.  cramps bad   Other Topics Concern    Parent/sibling w/ CABG, MI or angioplasty before 65F 55M? No   Social History Narrative    Not on file     Social Drivers of Health     Financial Resource Strain: Low Risk  (1/2/2024)    Financial Resource Strain     Within the past 12 months, have you or your family members you live with been unable to get utilities (heat, electricity) when it was really needed?: No   Food Insecurity: Low Risk  (1/2/2024)    Food Insecurity     Within the past 12 months, did you worry that your food would run out before you got money to buy more?: No     Within the past 12 months, did the food you bought just not last and you didn t have money to get more?: No   Transportation Needs: Low Risk  (1/2/2024)    Transportation Needs     Within the past 12 months, has lack of transportation kept you from medical appointments, getting your medicines, non-medical meetings or appointments, work, or from getting things that you need?: No   Physical Activity: Not on file   Stress: Not on file   Social Connections: Not on file   Interpersonal Safety: Low Risk  (11/20/2024)    Interpersonal Safety     Do you feel physically and emotionally safe where you currently live?: Yes     Within the past 12 months, have you been hit, slapped, kicked or otherwise physically hurt by someone?: No     Within the past 12 months, have you been humiliated or emotionally abused in other ways by  your partner or ex-partner?: No   Housing Stability: Low Risk  (1/2/2024)    Housing Stability     Do you have housing? : Yes     Are you worried about losing your housing?: No         FAMILY HISTORY:   Family History   Problem Relation Age of Onset    Diabetes Maternal Half-Brother     Thyroid Disease Maternal Half-Brother     Thyroid Disease Maternal Half-Sister       Non-contributory for problems with anesthesia    REVIEW OF SYSTEMS:   The patient was asked a 14 point review of systems regarding constitutional symptoms, eye symptoms, ears, nose, mouth, throat symptoms, cardiovascular symptoms, respiratory symptoms, gastrointestinal symptoms, genitourinary symptoms, musculoskeletal symptoms, integumentary symptoms, neurological symptoms, psychiatric symptoms, endocrine symptoms, hematologic/lymphatic symptoms, and allergic/ immunologic symptoms.   The pertinent factors have been included in the HPI and below.  Patient Supplied Answers to Review of Systems      3/28/2023     1:49 PM    ENT ROS   Psychology Frequently feeling anxious   Cardiopulmonary Cough    Breathing problems    Wheezing       Physical Examination   The patient underwent a physical examination as described below. The pertinent positive and negative findings are summarized after the description of the examination.  Constitutional: The patient's developmental and nutritional status was assessed. The patient's voice quality was assessed.  Head and Face: The head and face were inspected for deformities. The sinuses were palpated. The salivary glands were palpated. Facial muscle strength was assessed bilaterally.  Eyes: Extraocular movements and primary gaze alignment were assessed.  Ears, Nose, Mouth and Throat: The ears and nose were examined for deformities. The nasal septum, mucosa, and turbinates were inspected by anterior rhinoscopy. The lips, teeth, and gums were examined for abnormalities. The oral mucosa, tongue, palate, tonsils, lateral  and posterior pharynx were inspected for the presence of asymmetry or mucosal lesions.    Neck: The tracheal position was noted, and the neck mass palpated to determine if there were any asymmetries, abnormal neck masses, thyromegally, or thyroid nodules.  Respiratory: The nature of the breathing and chest expansion/symmetry was observed.  Cardiovascular: The patient was examined to determine the presence of any edema or jugular venous distension.  Abdomen: The contour of the abdomen was noted.  Lymphatic: The patient was examined for infraclavicular lymphadenopathy.  Musculoskeletal: The patient was inspected for the presence of skeletal deformities.  Extremities: The extremities were examined for any clubbing or cyanosis.  Skin: The skin was examined for inflammatory or neoplastic conditions.  Neurologic: The patient's orientation, mood, and affect were noted. The cranial nerve  functions were examined.  Other pertinent positive and negative findings on physical examination:   OC/OP: no lesions, uvula midline, soft palate elevates symmetrically   Neck: no lesions, no TH tenderness to palpation    All other physical examination findings were within normal limits and noncontributory.    Procedures   Video Laryngoscopy with Stroboscopy (CPT 03081)    Preoperative Diagnosis:  Dysphonia and throat symptoms  Postoperative Diagnosis: Dysphonia and throat symptoms  Indication:  Patient has new or persistent dysphonia and throat symptoms.  This requires evaluation by stroboscopy to fully delineate the laryngeal functioning; especially mucosal wave assessment, ultrasharp visualization of lesions on the vocal folds, and overall functioning of the larynx.  Details of Procedure: A 70 degree rigid telescopic laryngoscope or a distal chip flexible scope was used. The lighting was obtained via a light cable connected to a stroboscopic unit. The telescope was inserted either transorally or transnasally until the vocal folds could  be visualized. The patient was instructed to sustain the vowel  ee  at a comfortable pitch and loudness as the voice was monitored by a microphone connected to a fundamental frequency tracker. This circuit tracked vocal periodicity, allowing the light to flash in synchrony with the glottal cycles. A setting on the stroboscope was set to change the phase of light strobing with relation to the vocal fundamental frequency, producing an image of 1 to 2 glottal cycles every second. The video images were recorded for analysis. Use of the variable speed, slow and stop scan allowed careful study of pertinent segments of laryngeal function to increase accuracy of clinical assessments of function and dysfunction.  In particular, features of glottal closure, mucosal wave on the vocal fold cover and laryngeal symmetry were analyzed. Lastly, the patient was asked to phonate speech samples and auditory/perceptual evaluation of voice and resonance were performed.  The vocal quality was carefully evaluated for hoarseness, breathiness, loudness, phrase length and intelligibility to determine the source of dysphonia.    Findings:      B. LARYNGOVIDEOSTROBOSCOPY   Anatomic/Physiological Deviations:  RNC, grade 1, 5% narrowing with mild mucus in the subglottis  Mucosal wave: Right:  No restriction     Left: No restriction  Bilateral Vocal Fold Vibration: Symmetric  Vocal Process: Right: No restriction    Left:  No restriction  Vocal Fold closure: Complete glottal closure    Complication(s): None  Disposition: Patient tolerated the procedure well          Review of Relevant Clinical Data   I personally reviewed:    Labs:  Lab Results   Component Value Date    TSH 1.06 08/13/2021     Lab Results   Component Value Date     11/15/2024    CO2 26 11/15/2024    BUN 17.2 11/15/2024     Lab Results   Component Value Date    WBC 5.8 11/15/2024    HGB 14.5 11/15/2024    HCT 44.1 11/15/2024    MCV 90 11/15/2024     11/15/2024     No  "results found for: \"PT\", \"PTT\", \"INR\"  No results found for: \"DRAKE\"  No components found for: \"RHEUMATOIDFACTOR\", \"RF\"  No results found for: \"CRP\"  No components found for: \"CKTOT\", \"URICACID\"  No components found for: \"C3\", \"C4\", \"DSDNAAB\", \"NDNAABIFA\"  No results found for: \"MPOAB\"    Patient reported Quality of Life (QOL) Measures   Patient Supplied Answers To VHI Questionnaire      8/7/2023     7:52 AM   Voice Handicap Index (VHI-10)   My voice makes it difficult for people to hear me 2   People have difficulty understanding me in a noisy room 2   My voice difficulties restrict my personal and social life.  1   I feel left out of conversations because of my voice 1   My voice problem causes me to lose income 0   I feel as though I have to strain to produce voice 2   The clarity of my voice is unpredictable 2   My voice problem upsets me 2   My voice makes me feel handicapped 0   People ask, \"What's wrong with your voice?\" 1   VHI-10 13         Patient Supplied Answers To EAT Questionnaire      3/28/2023     2:02 PM   Eating Assessment Tool (EAT-10)   My swallowing problem has caused me to lose weight 0    My swallowing problem interferes with my ability to go out for meals 0    Swallowing liquids takes extra effort 0    Swallowing solids takes extra effort 0    Swallowing pills takes extra effort 0    Swallowing is painful 0    The pleasure of eating is affected by my swallowing 0    When I swallow food sticks in my throat 0    I cough when I eat 0    Swallowing is stressful 0    EAT-10 0       Proxy-reported         Patient Supplied Answers To CSI Questionnaire      11/6/2024     9:50 AM   Cough Severity Index (CSI)   My cough is worse when I lie down 0   My coughing problem causes me to restrict my personal and social life 0   I tend to avoid places because of my cough problem 0   I feel embarrassed because of my coughing problem 2   People ask, ''What's wrong?'' because I cough a lot 0   I run out of air when " I cough 0   My coughing problem affects my voice 2   My coughing problem limits my physical activity 0   My coughing problem upsets me 2   People ask me if I am sick because I cough a lot 0   CSI Score 6        Patient-reported           Scribe Disclosure:   I, ELSLI COFFEY, am serving as a scribe; to document services personally performed by Haylie Polo MD -based on data collection and the provider's statements to me.     Provider Disclosure:  I agree with above History, Review of Systems, Physical exam and Plan.  I have reviewed the content of the documentation and have edited it as needed. I have personally performed the services documented here and the documentation accurately represents those services and the decisions I have made.      Electronically signed by:  Haylie Polo MD    Laryngology    [facility]  Department of  Otolaryngology - Head and Neck Surgery  Clinics & Surgery Center  [address] [place] [id number]  Appointment line: [phone]  Fax: [phone]  [email or web address]

## 2024-12-29 ENCOUNTER — HEALTH MAINTENANCE LETTER (OUTPATIENT)
Age: 44
End: 2024-12-29

## 2025-01-07 ENCOUNTER — DOCUMENTATION ONLY (OUTPATIENT)
Dept: OTOLARYNGOLOGY | Facility: CLINIC | Age: 45
End: 2025-01-07
Payer: COMMERCIAL

## 2025-01-07 DIAGNOSIS — J38.6 SUBGLOTTIC STENOSIS NOT DUE TO SURGERY: ICD-10-CM

## 2025-01-07 NOTE — PROGRESS NOTES
New Rx for Qvar steroid inhaler sent to [facility] pharmacy due to receiving a form regarding prior authrozation for the Flovent inhaler being received in clinic due to insurance coverage.

## 2025-01-07 NOTE — PROGRESS NOTES
Prior authorization form from [employer] [facility] for flovent inhaler signed and faxed to [employer]. Fax number [phone]. 2 pgs faxed.

## 2025-01-08 LAB — NONINV COLON CA DNA+OCC BLD SCRN STL QL: NEGATIVE

## 2025-01-09 ENCOUNTER — DOCUMENTATION ONLY (OUTPATIENT)
Dept: OTOLARYNGOLOGY | Facility: CLINIC | Age: 45
End: 2025-01-09
Payer: COMMERCIAL

## 2025-01-09 DIAGNOSIS — J38.6 SUBGLOTTIC STENOSIS: Primary | ICD-10-CM

## 2025-01-09 RX ORDER — SODIUM CHLORIDE FOR INHALATION 0.9 %
5 VIAL, NEBULIZER (ML) INHALATION PRN
Qty: 600 ML | Refills: 5 | Status: SHIPPED | OUTPATIENT
Start: 2025-01-09

## 2025-01-09 NOTE — PROGRESS NOTES
Forms for prior auth and formulary exception completed and faxed to [employer] [facility]. Fax [phone]. 7 pgs faxed. Forms also sent for scanning.

## 2025-01-09 NOTE — PROGRESS NOTES
DME for nebulizer and RX for saline solution faxed to [facility]. Fax number [phone]. 2 pgs faxed.   13-Dec-2019 05:10

## 2025-01-15 ENCOUNTER — DOCUMENTATION ONLY (OUTPATIENT)
Dept: OTOLARYNGOLOGY | Facility: CLINIC | Age: 45
End: 2025-01-15
Payer: COMMERCIAL

## 2025-01-15 NOTE — PROGRESS NOTES
Spoke to pharmacist at [facility] in regards to preauth form received for pulmacort 90 mcg powder inhaler from \[facility]\"". Explained to pharmacy staff that we did not prescribe this for this patient and they also verified they did not have a prescription on file for this medication, so not sure why we received that. This writer inquired about inhaler that may be covered as we have been going back and forth with this patients medication.pharmacy staff suggested calling patients insurance to determine what would be covered or place Rx's for the different medications we would consider prescribing. Writer determined the last option was not the best. Writer reached out to patient via my chart to ask pt to follow up with insurance and determine if any inhaler on the providers list of acceptable inhalers would be covered. Asked pt to let us know if any of them would be covered so a prescription could be sent to pharmacy.

## 2025-01-16 ENCOUNTER — OFFICE VISIT (OUTPATIENT)
Dept: FAMILY MEDICINE | Facility: CLINIC | Age: 45
End: 2025-01-16
Payer: COMMERCIAL

## 2025-01-16 VITALS
SYSTOLIC BLOOD PRESSURE: 98 MMHG | OXYGEN SATURATION: 97 % | RESPIRATION RATE: 12 BRPM | WEIGHT: 189.1 LBS | BODY MASS INDEX: 35.7 KG/M2 | DIASTOLIC BLOOD PRESSURE: 52 MMHG | HEART RATE: 86 BPM | HEIGHT: 61 IN

## 2025-01-16 DIAGNOSIS — Z12.4 CERVICAL CANCER SCREENING: Primary | ICD-10-CM

## 2025-01-16 DIAGNOSIS — Z13.220 ENCOUNTER FOR LIPID SCREENING FOR CARDIOVASCULAR DISEASE: ICD-10-CM

## 2025-01-16 DIAGNOSIS — E11.9 TYPE 2 DIABETES MELLITUS WITHOUT COMPLICATION, WITHOUT LONG-TERM CURRENT USE OF INSULIN (H): ICD-10-CM

## 2025-01-16 DIAGNOSIS — Z13.6 ENCOUNTER FOR LIPID SCREENING FOR CARDIOVASCULAR DISEASE: ICD-10-CM

## 2025-01-16 LAB
CHOLEST SERPL-MCNC: 177 MG/DL
FASTING STATUS PATIENT QL REPORTED: ABNORMAL
HDLC SERPL-MCNC: 46 MG/DL
LDLC SERPL CALC-MCNC: 110 MG/DL
NONHDLC SERPL-MCNC: 131 MG/DL
TRIGL SERPL-MCNC: 107 MG/DL

## 2025-01-16 SDOH — HEALTH STABILITY: PHYSICAL HEALTH: ON AVERAGE, HOW MANY MINUTES DO YOU ENGAGE IN EXERCISE AT THIS LEVEL?: 30 MIN

## 2025-01-16 SDOH — HEALTH STABILITY: PHYSICAL HEALTH: ON AVERAGE, HOW MANY DAYS PER WEEK DO YOU ENGAGE IN MODERATE TO STRENUOUS EXERCISE (LIKE A BRISK WALK)?: 3 DAYS

## 2025-01-16 ASSESSMENT — PATIENT HEALTH QUESTIONNAIRE - PHQ9
SUM OF ALL RESPONSES TO PHQ QUESTIONS 1-9: 0
SUM OF ALL RESPONSES TO PHQ QUESTIONS 1-9: 0
10. IF YOU CHECKED OFF ANY PROBLEMS, HOW DIFFICULT HAVE THESE PROBLEMS MADE IT FOR YOU TO DO YOUR WORK, TAKE CARE OF THINGS AT HOME, OR GET ALONG WITH OTHER PEOPLE: NOT DIFFICULT AT ALL

## 2025-01-16 ASSESSMENT — SOCIAL DETERMINANTS OF HEALTH (SDOH): HOW OFTEN DO YOU GET TOGETHER WITH FRIENDS OR RELATIVES?: ONCE A WEEK

## 2025-01-16 NOTE — PROGRESS NOTES
"Preventive Care Visit  [facility]  Inocente Gutierrez MD, Family Medicine  Jan 16, 2025      Assessment & Plan     Type 2 diabetes mellitus without complication, without long-term current use of insulin (H)  Stable, Controlled by prescription medication prescribed by me and up to date.    Cervical cancer screening  - HPV and Gynecologic Cytology Panel - Recommended Age 30 - 65 Years    Encounter for lipid screening for cardiovascular disease  - Lipid panel reflex to direct LDL Fasting; Future    Patient has been advised of split billing requirements and indicates understanding: Yes        BMI  Estimated body mass index is 35.73 kg/m  as calculated from the following:    Height as of this encounter: 1.549 m (5' 1\").    Weight as of this encounter: 85.8 kg (189 lb 1.6 oz).   Weight management plan: Discussed healthy diet and exercise guidelines    Counseling  Appropriate preventive services were addressed with this patient via screening, questionnaire, or discussion as appropriate for fall prevention, nutrition, physical activity, Tobacco-use cessation, social engagement, weight loss and cognition.  Checklist reviewing preventive services available has been given to the patient.  Reviewed patient's diet, addressing concerns and/or questions.   She is at risk for lack of exercise and has been provided with information to increase physical activity for the benefit of her well-being.           Ragini Varma is a 44 year old, presenting for the following:  Physical        1/16/2025     7:56 AM   Additional Questions   Roomed by Sheryl HANEY CMA   Accompanied by None            Diabetes Follow-up    How often are you checking your blood sugar? One time daily  What time of day are you checking your blood sugars (select all that apply)?  Before and after meals  Have you had any blood sugars above 200?  No  Have you had any blood sugars below 70?  No  What symptoms do you notice when your blood " sugar is low?  Shaky  What concerns do you have today about your diabetes? None   Do you have any of these symptoms? (Select all that apply)  No numbness or tingling in feet.  No redness, sores or blisters on feet.  No complaints of excessive thirst.  No reports of blurry vision.  No significant changes to weight.      BP Readings from Last 2 Encounters:   01/16/25 98/52   12/19/24 105/64     Hemoglobin A1C (%)   Date Value   11/15/2024 5.7 (H)   01/02/2024 5.8 (H)     LDL Cholesterol Calculated (mg/dL)   Date Value   01/02/2024 130 (H)   04/29/2022 121 (H)           Health Care Directive  Patient does not have a Health Care Directive: Patient states has Advance Directive and will bring in a copy to clinic.      1/16/2025   General Health   How would you rate your overall physical health? Good   Feel stress (tense, anxious, or unable to sleep) Only a little   (!) STRESS CONCERN      1/16/2025   Nutrition   Three or more servings of calcium each day? (!) NO   Diet: Diabetic   How many servings of fruit and vegetables per day? (!) 0-1   How many sweetened beverages each day? 0-1         1/16/2025   Exercise   Days per week of moderate/strenous exercise 3 days   Average minutes spent exercising at this level 30 min         1/16/2025   Social Factors   Frequency of gathering with friends or relatives Once a week   Worry food won't last until get money to buy more No   Food not last or not have enough money for food? No   Do you have housing? (Housing is defined as stable permanent housing and does not include staying ouside in a car, in a tent, in an abandoned building, in an overnight shelter, or couch-surfing.) Yes   Are you worried about losing your housing? No   Lack of transportation? No   Unable to get utilities (heat,electricity)? No         1/16/2025   Dental   Dentist two times every year? Yes         1/16/2025   TB Screening   Were you born outside of the [place]? No       Today's PHQ-9 Score:       1/16/2025      7:48 AM   PHQ-9 SCORE   PHQ-9 Total Score MyChart 0   PHQ-9 Total Score 0        Patient-reported         1/16/2025   Substance Use   Alcohol more than 3/day or more than 7/wk No   Do you use any other substances recreationally? No     Social History     Tobacco Use    Smoking status: Never     Passive exposure: Never    Smokeless tobacco: Never   Vaping Use    Vaping status: Never Used   Substance Use Topics    Alcohol use: Yes     Comment: once a month    Drug use: Never           11/10/2023   LAST FHS-7 RESULTS   1st degree relative breast or ovarian cancer No   Any relative bilateral breast cancer No   Any male have breast cancer No   Any ONE woman have BOTH breast AND ovarian cancer No   Any woman with breast cancer before 50yrs No   2 or more relatives with breast AND/OR ovarian cancer No   2 or more relatives with breast AND/OR bowel cancer No        Mammogram Screening - Mammogram every 1-2 years updated in Health Maintenance based on mutual decision making        1/16/2025   STI Screening   New sexual partner(s) since last STI/HIV test? No     History of abnormal Pap smear: No - age 30- 64 PAP with HPV every 5 years recommended        Latest Ref Rng & Units 11/16/2023     5:41 PM   PAP / HPV   PAP  Negative for Intraepithelial Lesion or Malignancy (NILM)    HPV 16 DNA Negative Negative    HPV 18 DNA Negative Negative    Other HR HPV Negative Negative      ASCVD Risk   The 10-year ASCVD risk score (Luis Angel POLANCO, et al., 2019) is: 1.4%    Values used to calculate the score:      Age: 44 years      Sex: Female      Is Non- : No      Diabetic: Yes      Tobacco smoker: No      Systolic Blood Pressure: 98 mmHg      Is BP treated: No      HDL Cholesterol: 45 mg/dL      Total Cholesterol: 206 mg/dL        1/16/2025   Contraception/Family Planning   Questions about contraception or family planning No        Reviewed and updated as needed this visit by Provider                          "  Objective    Exam  BP 98/52   Pulse 86   Resp 12   Ht 1.549 m (5' 1\")   Wt 85.8 kg (189 lb 1.6 oz)   LMP  (LMP Unknown)   SpO2 97%   BMI 35.73 kg/m     Estimated body mass index is 35.73 kg/m  as calculated from the following:    Height as of this encounter: 1.549 m (5' 1\").    Weight as of this encounter: 85.8 kg (189 lb 1.6 oz).    Physical Exam  GENERAL: alert and no distress  NECK: no adenopathy, no asymmetry, masses, or scars  RESP: lungs clear to auscultation - no rales, rhonchi or wheezes  CV: regular rate and rhythm, normal S1 S2, no S3 or S4, no murmur, click or rub, no peripheral edema  ABDOMEN: soft, nontender, no hepatosplenomegaly, no masses and bowel sounds normal   (female): normal female external genitalia, normal urethral meatus , normal vaginal mucosa, and normal cervix, adnexae, and uterus without masses.  MS: no gross musculoskeletal defects noted, no edema        Signed Electronically by: Inocente Gutierrez MD    "

## 2025-01-20 ENCOUNTER — TELEPHONE (OUTPATIENT)
Dept: OTOLARYNGOLOGY | Facility: CLINIC | Age: 45
End: 2025-01-20
Payer: COMMERCIAL

## 2025-01-20 NOTE — TELEPHONE ENCOUNTER
Patient confirmed scheduled appointment:     Date: 7/22/25 @ 8:45am  Appointment Type: Return Throat  Provider: Dr. Stallings ([facility]) Christ   Location: [facility] [place]  Testing/imaging:   Additional Notes:

## 2025-01-20 NOTE — TELEPHONE ENCOUNTER
Patient confirmed scheduled appointment:     3/11/25 @9AM  4/9/25 @8AM  4/29/25 @8AM  5/20/25 @8AM  Appointment Type: return slp voice virtual  Provider: Lizzie Huffman  [place] [facility] [place]  Additional Notes:      Island Pedicle Flap-Requiring Vessel Identification Text: The defect edges were debeveled with a #15 scalpel blade.  Given the location of the defect, shape of the defect and the proximity to free margins an island pedicle advancement flap was deemed most appropriate.  Using a sterile surgical marker, an appropriate advancement flap was drawn, based on the axial vessel mentioned above, incorporating the defect, outlining the appropriate donor tissue and placing the expected incisions within the relaxed skin tension lines where possible.    The area thus outlined was incised deep to adipose tissue with a #15 scalpel blade.  The skin margins were undermined to an appropriate distance in all directions around the primary defect and laterally outward around the island pedicle utilizing iris scissors.  There was minimal undermining beneath the pedicle flap.

## 2025-01-21 LAB
BKR AP ASSOCIATED HPV REPORT: NORMAL
BKR LAB AP GYN ADEQUACY: NORMAL
BKR LAB AP GYN INTERPRETATION: NORMAL
BKR LAB AP LMP: NORMAL
BKR LAB AP PREVIOUS ABNL DX: NORMAL
BKR LAB AP PREVIOUS ABNORMAL: NORMAL
PATH REPORT.COMMENTS IMP SPEC: NORMAL
PATH REPORT.COMMENTS IMP SPEC: NORMAL
PATH REPORT.RELEVANT HX SPEC: NORMAL

## 2025-01-22 PROBLEM — Z12.4 CERVICAL CANCER SCREENING: Status: ACTIVE | Noted: 2023-11-29

## 2025-03-02 ENCOUNTER — HEALTH MAINTENANCE LETTER (OUTPATIENT)
Age: 45
End: 2025-03-02

## 2025-03-04 ENCOUNTER — E-VISIT (OUTPATIENT)
Dept: URGENT CARE | Facility: CLINIC | Age: 45
End: 2025-03-04
Payer: COMMERCIAL

## 2025-03-04 DIAGNOSIS — R21 RASH: Primary | ICD-10-CM

## 2025-03-04 PROCEDURE — 99207 PR NON-BILLABLE SERV PER CHARTING: CPT | Performed by: NURSE PRACTITIONER

## 2025-03-05 ENCOUNTER — OFFICE VISIT (OUTPATIENT)
Dept: FAMILY MEDICINE | Facility: CLINIC | Age: 45
End: 2025-03-05
Payer: COMMERCIAL

## 2025-03-05 VITALS
OXYGEN SATURATION: 98 % | RESPIRATION RATE: 14 BRPM | TEMPERATURE: 98.3 F | BODY MASS INDEX: 35.8 KG/M2 | SYSTOLIC BLOOD PRESSURE: 106 MMHG | HEART RATE: 82 BPM | DIASTOLIC BLOOD PRESSURE: 68 MMHG | HEIGHT: 61 IN | WEIGHT: 189.6 LBS

## 2025-03-05 DIAGNOSIS — E11.9 TYPE 2 DIABETES MELLITUS WITHOUT COMPLICATION, WITHOUT LONG-TERM CURRENT USE OF INSULIN (H): ICD-10-CM

## 2025-03-05 DIAGNOSIS — J38.6 SUBGLOTTIC STENOSIS NOT DUE TO SURGERY: ICD-10-CM

## 2025-03-05 DIAGNOSIS — R21 RASH AND NONSPECIFIC SKIN ERUPTION: Primary | ICD-10-CM

## 2025-03-05 PROCEDURE — 3078F DIAST BP <80 MM HG: CPT | Performed by: PHYSICIAN ASSISTANT

## 2025-03-05 PROCEDURE — 99213 OFFICE O/P EST LOW 20 MIN: CPT | Performed by: PHYSICIAN ASSISTANT

## 2025-03-05 PROCEDURE — 3074F SYST BP LT 130 MM HG: CPT | Performed by: PHYSICIAN ASSISTANT

## 2025-03-05 RX ORDER — PREDNISONE 20 MG/1
20 TABLET ORAL DAILY
Qty: 7 TABLET | Refills: 0 | Status: SHIPPED | OUTPATIENT
Start: 2025-03-05

## 2025-03-05 RX ORDER — CETIRIZINE HYDROCHLORIDE 10 MG/1
10 TABLET ORAL DAILY
Qty: 14 TABLET | Refills: 1 | Status: SHIPPED | OUTPATIENT
Start: 2025-03-05

## 2025-03-05 NOTE — PROGRESS NOTES
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  Patient was in the [place] recently although she is the only person in the traveling prior to that has this rash  It was intensely pruritic it seems to be a little bit better she denies any definite exposure although was in a lot of different bodies of water and then did some digging in the sand  No fever no chills  She has not had problems at this before  She has been using calamine on it  She also has a history of subglottic irritation she is requesting a nebulizer    History of Present Illness       Reason for visit:  Bites  Symptom onset:  3-7 days ago  Symptoms include:  Pain and itching  Symptom intensity:  Moderate  Symptom progression:  Staying the same  Had these symptoms before:  No  What makes it worse:  No  What makes it better:  No   She is taking medications regularly.                    Objective    /68 (BP Location: Right arm, Patient Position: Sitting)   Pulse 82   Temp 98.3  F (36.8  C)   Resp 14   Ht 1.549 m (5' 1\")   Wt 86 kg (189 lb 9.6 oz)   LMP  (LMP " Unknown)   SpO2 98%   Breastfeeding No   BMI 35.82 kg/m    Body mass index is 35.82 kg/m .  Physical Exam   Attentive no acute distress  She has erythematous papular eruption on the legs mainly in the popliteal fossa near the antecubital fossa on the right arm 1 spot on the neck no pustules no vesicles            Signed Electronically by: MUKESH Taveras

## 2025-03-05 NOTE — PATIENT INSTRUCTIONS
Dear Anuradha Brown,    We are sorry you are not feeling well. Based on the responses you provided, it is recommended that you be seen in-person in urgent care so we can better evaluate your symptoms. Please click here to find the nearest urgent care location to you.   You will not be charged for this Visit. Thank you for trusting us with your care.    KEN Gentile CNP

## 2025-03-11 ENCOUNTER — VIRTUAL VISIT (OUTPATIENT)
Dept: OTOLARYNGOLOGY | Facility: CLINIC | Age: 45
End: 2025-03-11
Payer: COMMERCIAL

## 2025-03-11 DIAGNOSIS — R49.0 DYSPHONIA: Primary | ICD-10-CM

## 2025-03-11 DIAGNOSIS — J38.7 LARYNGEAL HYPERFUNCTION: ICD-10-CM

## 2025-03-11 DIAGNOSIS — J38.6 SUBGLOTTIC STENOSIS: ICD-10-CM

## 2025-03-11 NOTE — PROGRESS NOTES
[facility]  VOICE / UPPER AIRWAY TREATMENT NOTE (CPT 71010)      Patient's name: Anuradha Brown  Date of Session: 3/11/2025  Providing SLP: Lizzie Huffman MS CCC-SLP  Referring Provider: Haylie Polo MD  Insurance Coverage: [employer]  Total # of SLP Visits: 2  # of SLP Therapy Sessions: 1  Session Location: Kojo was seen via telehealth today.     The patient has been notified and verbally consented to the following statements:   This video visit will be conducted between you and your provider.  Patient has opted to conduct today's video visit vs an in-person appointment, and is not able to attend due to possible exposure to COVID-19.    If during the course of the call the provider feels a video visit is not appropriate, you will not be charged for this service.     Provider has received verbal consent for billable virtual visit from the patient? Yes  Preferred method for receiving information: [employer]  Call initiated at: 9:01 AM  Call ended at: 9:59 AM  Platform used to conduct today's virtual appointment: [employer] [facility]  Location of provider: Residence in [place]  Location of patient: Residence in [place]  (Clinician is licensed in both states)      Impressions from most recent evaluation on 12/19/24 by Monica Wang CCC-SLP:   Kojo Brown is presenting today with Dysphonia (R49.0) in the context of Subglottic Stenosis (J38.6) and Laryngeal Hyperfunction (J38.7). 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 Slightly reduced right-sided amplitude and normal mucosal wave, mildly reduced left-sided amplitude with slightly reduced mucosal wave. Overall good periodicity and symmetry,  "with very slight anterior gap noted intermittently between typically pressed closure. She perceptually demonstrated mild-moderate roughness, mild strain, and lower than normal FO, non-optimal respiratory mechanics, and occasional throat clearing/cough with tenderness of the thyrohyoid space. She would benefit from a course of speech therapy targeting reduced laryngeal irritation (PND), improved respiratory mechanics, reduced perilaryngeal hyperfunction, and phonatory patterns targeting reduced glottic impact and laryngeal hyperfunction. A course of speech therapy is recommended to improve voice quality and promote reduced discomfort, effort and fatigue. 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 She demonstrates a Good prognosis for improvement given adherence to therapeutic recommendations.        SUBJECTIVE:  Peak flows have been 300-350 depending on effort  More coughing since the last surgery  Mucus plugs  Will impair her talking  Needs to \"hack\" it up  Some increase in coughing with talking  Uses nebulizer intermittently  PCP prescribed her a machine  Inhaler feels like it mostly hits the back of her tongue  Constant runny nose  Takes antihistamines during allergy season  Reports that this was not an issue when she was recently in the [place]  No nasal sprays  Started about a year ago      OBJECTIVE/ASSESSMENT:        3/28/2023     2:01 PM 5/18/2023    10:59 PM 8/7/2023     7:52 AM   Voice Handicap Index (VHI-10)   My voice makes it difficult for people to hear me 2  2 2   People have difficulty understanding me in a noisy room 2  2 2   My voice difficulties restrict my personal and social life.  0  0 1   I feel left out of conversations because of my voice 0  0 1   My voice problem causes me to lose income 0  0 0   I feel as though I have to strain to produce voice 2  2 2   The clarity of my voice is unpredictable 2  2 2   My voice problem upsets me 1  0 2   My voice makes me feel handicapped 1  0 0 " "  People ask, \"What's wrong with your voice?\" 2  0 1   VHI-10 12 8 13       Proxy-reported         8/7/2023     7:52 AM 11/6/2024     9:50 AM 3/11/2025     8:44 AM   Cough Severity Index (CSI)   My cough is worse when I lie down 0 0 0   My coughing problem causes me to restrict my personal and social life 0 0 0   I tend to avoid places because of my cough problem 0 0 0   I feel embarrassed because of my coughing problem 2 2 2   People ask, ''What's wrong?'' because I cough a lot 0 0 3   I run out of air when I cough 0 0 2   My coughing problem affects my voice 2 2 3   My coughing problem limits my physical activity 0 0 1   My coughing problem upsets me 3 2 2   People ask me if I am sick because I cough a lot 0 0 2   CSI Score 7 6  15        Patient-reported         3/28/2023     2:02 PM   Eating Assessment Tool (EAT-10)   My swallowing problem has caused me to lose weight 0    My swallowing problem interferes with my ability to go out for meals 0    Swallowing liquids takes extra effort 0    Swallowing solids takes extra effort 0    Swallowing pills takes extra effort 0    Swallowing is painful 0    The pleasure of eating is affected by my swallowing 0    When I swallow food sticks in my throat 0    I cough when I eat 0    Swallowing is stressful 0    EAT-10 0       Proxy-reported         3/23/2023    10:54 AM   Dyspnea Index   1. I have trouble getting air in. 1   2. I feel tightness in my throat when I am having boni breathing problem. 2   3. It takes more effort to breathe than it used to. 3   4. Change in weather affect my breathing problem. 3   5. My breathing gets worse with stress. 2   6. I make sound/noise breathing in 1   7. I have to strain to breathe. 2   8. My shortness of breath gets worse with exercise or physical activity 4   9. My breathing problem makes me feel stressed. 2   10. My breathing problem casuses me to restrict my personal and social life. 2   Dyspnea Index Total Score 22       THERAPEUTIC " ACTIVITIES:  Vocal hygiene concepts were discussed with the patient to optimize vocal health. Systemic and topical hydration was emphasized.  Kojo reports using her nebulized saline as needed. It has been recommended that she incorporate nebulized saline more frequently, particularly in the winter or during allergy season when mucus could be increased, to aid with topical hydration.  Kojo hopes that obtaining a portable nebulizer will allow her better access to this treatment, particularly as she is driving on her long commute to and from work. She will also incorporate a saline nasal rinse to aid with chronic rhinorrhea, as well as work to optimize her allergy symptoms as much as possible.      Cough and throat clearing reduction strategies are a behavioral treatment to replace chronic coughing/throat clearing behaviors with multiple effortful swallows, coughing and throat clearing without phonation, and rescue breathing, thus reducing laryngeal hypersensitivity. These exercises were instructed today, and Kojo performed them with appropriate accuracy with maximal clinician cueing and modeling.  Of note, there was an instance during today's session immediately following an attempt to cough without phonotrauma where Kojo's voice suddenly became dystonic with moderate roughness and weakness.  She coughed partially once to dislodge mucous, and her dysphonia resolved.  She reports that outside of these instances, no dysphonia is present      IMPRESSIONS:   Dysphonia (R49.0) in the context of Subglottic Stenosis (J38.6) and Laryngeal Hyperfunction (J38.7)    Kojo endorses dealing with excessive coughing and mucous plugs since her most recent surgery for subglottic stenosis.  This has not been something that she has dealt with previously.  Patient education was provided regarding subglottic stenosis and thick, sticky mucus frequently sitting on top of the stenosis shelf, which makes it difficult to dislodge.  We will  plan to send this mucus as much as possible via topical hydration.  Cough suppression strategies were also instructed to aid with moving this mucus without inducing phonotrauma.  At this time, dysphonia is only associated with mucus collection and resolves immediately after dislodging the plug       PLAN:  Kojo will adhere to vocal hygiene recommendations, particularly topical hydration  Kojo will utilize cough suppression strategies as needed between sessions  Written instructions were provided to aid home programming via MyChart  Kojo continues to demonstrate adequate progress toward long- and short-term goals.  Continued voice therapy services are recommended.  Kojo will follow-up for additional sessions on 25. Current goals will continue to be addressed.  Kojo is in agreement with this plan of care.      SLP PLAN IN MULTIDISCIPLINARY CLINIC:  Voice SLP presence at next appointment with laryngologist (e.g. Dr. Polo, Dr. Rahman, Dr. Duncan) is likely a needed, as this will be a standard checkup for her subglottic stenosis. Next scheduled MD appointment is 25.      BILLING SUMMARY:  Total treatment time: 58 minutes (including 10 minutes of chart review and preparation, interpretation of testing and therapeutic maneuvers, and document writing)  Speech Pathology Treatment (09178)      Lizzie Huffman MS CCC-SLP  Speech-Language Pathologist  [facility]  Department of Otolaryngology - Head and Neck Surgery  [facility]  [email or web address]  Direct: [phone]  [phone]    *This note may have been completed using [id number] dictation software, so errors may exist. Please contact me for clarification if needed*

## 2025-03-11 NOTE — LETTER
3/11/2025       RE: Anuradha Brown  [address] [facility] [address]     Dear Colleague,    Thank you for referring your patient, Anuradha Brown, to the [facility] VOICE CLINIC [place] [facility]. Please see a copy of my visit note below.    [facility] VOICE [facility]  VOICE / UPPER AIRWAY TREATMENT NOTE (CPT 99600)      Patient's name: Anuradha Brown  Date of Session: 3/11/2025  Providing SLP: Lizzie Huffman MS CCC-SLP  Referring Provider: Haylie Polo MD  Insurance Coverage: [employer]  Total # of SLP Visits: 2  # of SLP Therapy Sessions: 1  Session Location: Kojo was seen via telehealth today.     The patient has been notified and verbally consented to the following statements:   This video visit will be conducted between you and your provider.  Patient has opted to conduct today's video visit vs an in-person appointment, and is not able to attend due to possible exposure to COVID-19.    If during the course of the call the provider feels a video visit is not appropriate, you will not be charged for this service.     Provider has received verbal consent for billable virtual visit from the patient? 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 Laryngoscopy revealed mod-severe 4-way constrictive supraglottic hyperfunction with speech, mild-moderate redness and fullness of the arytenoids/posterior cricoid region, mild mucus collection/banding in the laryngopharynx,  "and slightly red/full vocal folds bilaterally with slightly irregular margins, small prominence noted at mid-length of left vocal fold. Slightly reduced right-sided amplitude and normal mucosal wave, mildly reduced left-sided amplitude with slightly reduced mucosal wave. Overall good periodicity and symmetry, with very slight anterior gap noted intermittently between typically pressed closure. She perceptually demonstrated mild-moderate roughness, mild strain, and lower than normal FO, non-optimal respiratory mechanics, and occasional throat clearing/cough with tenderness of the thyrohyoid space. She would benefit from a course of speech therapy targeting reduced laryngeal irritation (PND), improved respiratory mechanics, reduced perilaryngeal hyperfunction, and phonatory patterns targeting reduced glottic impact and laryngeal hyperfunction. A course of speech therapy is recommended to improve voice quality and promote reduced discomfort, effort and fatigue. 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 She demonstrates a Good prognosis for improvement given adherence to therapeutic recommendations.        SUBJECTIVE:  Peak flows have been 300-350 depending on effort  More coughing since the last surgery  Mucus plugs  Will impair her talking  Needs to \"hack\" it up  Some increase in coughing with talking  Uses nebulizer intermittently  PCP prescribed her a machine  Inhaler feels like it mostly hits the back of her tongue  Constant runny nose  Takes antihistamines during allergy season  Reports that this was not an issue when she was recently in the [place]  No nasal sprays  Started about a year ago      OBJECTIVE/ASSESSMENT:        3/28/2023     2:01 PM 5/18/2023    10:59 PM 8/7/2023     7:52 AM   Voice Handicap Index (VHI-10)   My voice makes it difficult for people to hear me 2  2 2   People have difficulty understanding me in a noisy room 2  2 2   My voice difficulties restrict my personal and social life.  " "0  0 1   I feel left out of conversations because of my voice 0  0 1   My voice problem causes me to lose income 0  0 0   I feel as though I have to strain to produce voice 2  2 2   The clarity of my voice is unpredictable 2  2 2   My voice problem upsets me 1  0 2   My voice makes me feel handicapped 1  0 0   People ask, \"What's wrong with your voice?\" 2  0 1   VHI-10 12 8 13       Proxy-reported         8/7/2023     7:52 AM 11/6/2024     9:50 AM 3/11/2025     8:44 AM   Cough Severity Index (CSI)   My cough is worse when I lie down 0 0 0   My coughing problem causes me to restrict my personal and social life 0 0 0   I tend to avoid places because of my cough problem 0 0 0   I feel embarrassed because of my coughing problem 2 2 2   People ask, ''What's wrong?'' because I cough a lot 0 0 3   I run out of air when I cough 0 0 2   My coughing problem affects my voice 2 2 3   My coughing problem limits my physical activity 0 0 1   My coughing problem upsets me 3 2 2   People ask me if I am sick because I cough a lot 0 0 2   CSI Score 7 6  15        Patient-reported         3/28/2023     2:02 PM   Eating Assessment Tool (EAT-10)   My swallowing problem has caused me to lose weight 0    My swallowing problem interferes with my ability to go out for meals 0    Swallowing liquids takes extra effort 0    Swallowing solids takes extra effort 0    Swallowing pills takes extra effort 0    Swallowing is painful 0    The pleasure of eating is affected by my swallowing 0    When I swallow food sticks in my throat 0    I cough when I eat 0    Swallowing is stressful 0    EAT-10 0       Proxy-reported         3/23/2023    10:54 AM   Dyspnea Index   1. I have trouble getting air in. 1   2. I feel tightness in my throat when I am having boni breathing problem. 2   3. It takes more effort to breathe than it used to. 3   4. Change in weather affect my breathing problem. 3   5. My breathing gets worse with stress. 2   6. I make " sound/noise breathing in 1   7. I have to strain to breathe. 2   8. My shortness of breath gets worse with exercise or physical activity 4   9. My breathing problem makes me feel stressed. 2   10. My breathing problem casuses me to restrict my personal and social life. 2   Dyspnea Index Total Score 22       THERAPEUTIC ACTIVITIES:  Vocal hygiene concepts were discussed with the patient to optimize vocal health. Systemic and topical hydration was emphasized.  Kojo reports using her nebulized saline as needed. It has been recommended that she incorporate nebulized saline more frequently, particularly in the winter or during allergy season when mucus could be increased, to aid with topical hydration.  Kojo hopes that obtaining a portable nebulizer will allow her better access to this treatment, particularly as she is driving on her long commute to and from work. She will also incorporate a saline nasal rinse to aid with chronic rhinorrhea, as well as work to optimize her allergy symptoms as much as possible.      Cough and throat clearing reduction strategies are a behavioral treatment to replace chronic coughing/throat clearing behaviors with multiple effortful swallows, coughing and throat clearing without phonation, and rescue breathing, thus reducing laryngeal hypersensitivity. These exercises were instructed today, and Kojo performed them with appropriate accuracy with maximal clinician cueing and modeling.  Of note, there was an instance during today's session immediately following an attempt to cough without phonotrauma where Kojo's voice suddenly became dystonic with moderate roughness and weakness.  She coughed partially once to dislodge mucous, and her dysphonia resolved.  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  This has not been something that she has dealt with previously.  Patient education was provided regarding subglottic stenosis and thick, sticky mucus frequently sitting on top of the stenosis shelf, which makes it difficult to dislodge.  We will plan to send this mucus as much as possible via topical hydration.  Cough suppression strategies were also instructed to aid with moving this mucus without inducing phonotrauma.  At this time, dysphonia is only associated with mucus collection and resolves immediately after dislodging the plug       PLAN:  Kojo will adhere to vocal hygiene recommendations, particularly topical hydration  Kojo will utilize cough suppression strategies as needed between sessions  Written instructions were provided to aid home programming via [employer]  Kojo continues to demonstrate adequate progress toward long- and short-term goals.  Continued voice therapy services are recommended.  Kojo will follow-up for additional sessions on 25. Current goals will continue to be addressed.  Kojo is in agreement with this plan of care.      SLP PLAN IN MULTIDISCIPLINARY CLINIC:  Voice SLP presence at next appointment with laryngologist (e.g. Dr. Polo, Dr. Rahman, Dr. Duncan) is likely a needed, as this will be a standard checkup for her subglottic stenosis. Next scheduled MD appointment is 25.      BILLING SUMMARY:  Total treatment time: 58 minutes (including 10 minutes of chart review and preparation, interpretation of testing and therapeutic maneuvers, and document writing)  Speech Pathology Treatment (10598)      Lizzie Huffman MS CCC-SLP  Speech-Language Pathologist  [facility]  Department of Otolaryngology - Head and Neck Surgery  [facility]  [email or web address]  Direct: [phone]  [phone]    *This note may have been completed using  [id number] dictation software, so errors may exist. Please contact me for clarification if needed*      Again, thank you for allowing me to participate in the care of your patient.      Sincerely,    Lizzie Huffman, SLP

## 2025-03-11 NOTE — PATIENT INSTRUCTIONS
"Cough suppression techniques    \"Mandaen throat clear\"  Very light throat clearing to dislodge mucus  Like you're whispering \"eh eh eh\"    The \"Big H\"  To dislodge mucus, pass a lot of air through open vocal folds, like you're doing a big \"HHHH\"  Use the same amount of airflow (e.g. speed, amount) that you would for a cough  Make sure there's minimal noise and no coughing sound/vocal folds coming together        Vocal hygiene    Hydration: drink til you \"pee pale\"  64 oz of plain water daily OR your body weight in lbs divided by 2  More if you're sweating, active, etc  Watch out for caffeine and alcohol - they're drying and you need to drink more water to counteract their effects  Topical hydration with humidifiers  Nebulized isotonic saline  Isotonic saline (0.9%) nasal spray  Use a humidifier at night in your bedroom and/or during the day in frequently used rooms  Nasal saline rinse: \"Ezequiel Med\" or neti pot      "

## 2025-04-09 DIAGNOSIS — E11.9 TYPE 2 DIABETES MELLITUS WITHOUT COMPLICATION, WITHOUT LONG-TERM CURRENT USE OF INSULIN (H): ICD-10-CM

## 2025-04-09 RX ORDER — TIRZEPATIDE 12.5 MG/.5ML
INJECTION, SOLUTION SUBCUTANEOUS
Qty: 2 ML | Refills: 2 | Status: SHIPPED | OUTPATIENT
Start: 2025-04-09

## 2025-04-29 ENCOUNTER — VIRTUAL VISIT (OUTPATIENT)
Dept: OTOLARYNGOLOGY | Facility: CLINIC | Age: 45
End: 2025-04-29
Payer: COMMERCIAL

## 2025-04-29 DIAGNOSIS — R49.0 DYSPHONIA: Primary | ICD-10-CM

## 2025-04-29 DIAGNOSIS — J38.6 SUBGLOTTIC STENOSIS: ICD-10-CM

## 2025-04-29 DIAGNOSIS — J38.7 LARYNGEAL HYPERFUNCTION: ICD-10-CM

## 2025-04-29 NOTE — PROGRESS NOTES
[facility] VOICE CLINIC  VOICE / UPPER AIRWAY TREATMENT NOTE (CPT 00888)      Patient's name: Anuradha Brown  Date of Session: 4/29/2025   Providing SLP: Lizzie Huffman MS CCC-SLP  Referring Provider: Haylie Polo MD  Insurance Coverage: [employer]  Total # of SLP Visits: 3  # of SLP Therapy Sessions: 2  Session Location: Kojo was seen via telehealth today.     The patient has been notified and verbally consented to the following statements:   This video visit will be conducted between you and your provider.  Patient has opted to conduct today's video visit vs an in-person appointment, and is not able to attend due to possible exposure to COVID-19.    If during the course of the call the provider feels a video visit is not appropriate, you will not be charged for this service.     Provider has received verbal consent for billable virtual visit from the patient? 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 Slightly reduced right-sided amplitude and normal mucosal wave, mildly reduced left-sided amplitude with slightly reduced mucosal wave. Overall good periodicity and symmetry,  "with very slight anterior gap noted intermittently between typically pressed closure. She perceptually demonstrated mild-moderate roughness, mild strain, and lower than normal FO, non-optimal respiratory mechanics, and occasional throat clearing/cough with tenderness of the thyrohyoid space. She would benefit from a course of speech therapy targeting reduced laryngeal irritation (PND), improved respiratory mechanics, reduced perilaryngeal hyperfunction, and phonatory patterns targeting reduced glottic impact and laryngeal hyperfunction. A course of speech therapy is recommended to improve voice quality and promote reduced discomfort, effort and fatigue. 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 She demonstrates a Good prognosis for improvement given adherence to therapeutic recommendations.        SUBJECTIVE:  Things are the same  Got a portable nebulizer  Increasing to daily use rather than as needed  No difference  No increase with allergies  Deals with throat mucus daily and it interrupts her voice until she coughs it up  Peak flows are the same      OBJECTIVE/ASSESSMENT:        3/28/2023     2:01 PM 5/18/2023    10:59 PM 8/7/2023     7:52 AM   Voice Handicap Index (VHI-10)   My voice makes it difficult for people to hear me 2  2 2   People have difficulty understanding me in a noisy room 2  2 2   My voice difficulties restrict my personal and social life.  0  0 1   I feel left out of conversations because of my voice 0  0 1   My voice problem causes me to lose income 0  0 0   I feel as though I have to strain to produce voice 2  2 2   The clarity of my voice is unpredictable 2  2 2   My voice problem upsets me 1  0 2   My voice makes me feel handicapped 1  0 0   People ask, \"What's wrong with your voice?\" 2  0 1   VHI-10 12 8 13       Proxy-reported         8/7/2023     7:52 AM 11/6/2024     9:50 AM 3/11/2025     8:44 AM   Cough Severity Index (CSI)   My cough is worse when I lie down 0 0 0   My coughing problem " causes me to restrict my personal and social life 0 0 0   I tend to avoid places because of my cough problem 0 0 0   I feel embarrassed because of my coughing problem 2 2 2   People ask, ''What's wrong?'' because I cough a lot 0 0 3   I run out of air when I cough 0 0 2   My coughing problem affects my voice 2 2 3   My coughing problem limits my physical activity 0 0 1   My coughing problem upsets me 3 2 2   People ask me if I am sick because I cough a lot 0 0 2   CSI Score 7 6  15        Patient-reported         3/28/2023     2:02 PM   Eating Assessment Tool (EAT-10)   My swallowing problem has caused me to lose weight 0    My swallowing problem interferes with my ability to go out for meals 0    Swallowing liquids takes extra effort 0    Swallowing solids takes extra effort 0    Swallowing pills takes extra effort 0    Swallowing is painful 0    The pleasure of eating is affected by my swallowing 0    When I swallow food sticks in my throat 0    I cough when I eat 0    Swallowing is stressful 0    EAT-10 0       Proxy-reported         3/23/2023    10:54 AM   Dyspnea Index   1. I have trouble getting air in. 1   2. I feel tightness in my throat when I am having boni breathing problem. 2   3. It takes more effort to breathe than it used to. 3   4. Change in weather affect my breathing problem. 3   5. My breathing gets worse with stress. 2   6. I make sound/noise breathing in 1   7. I have to strain to breathe. 2   8. My shortness of breath gets worse with exercise or physical activity 4   9. My breathing problem makes me feel stressed. 2   10. My breathing problem casuses me to restrict my personal and social life. 2   Dyspnea Index Total Score 22       THERAPEUTIC ACTIVITIES:  Vocal hygiene concepts were discussed with the patient to optimize vocal health. Systemic and topical hydration was emphasized.  Kojo did increase to using her portable nebulizer daily and denies a noticeable difference in her throat mucus.   At this time, it has been recommended that she increase to twice daily to better understand if this is a beneficial treatment.  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  Clinician answered questions regarding how to breathe while using the nebulizer, as well as potential increased benefits from twice daily use.  Kojo will incorporate this.  Because her mucus is likely secondary to subglottic stenosis and mucus sticking to that shelf, no additional speech therapy is recommended.       SLP PLAN IN MULTIDISCIPLINARY CLINIC:  Voice SLP presence at next appointment with laryngologist (e.g. Dr. Polo, Dr. Rahman, Dr. Duncan) is likely not needed, as this will be a standard checkup for her subglottic stenosis. Next scheduled MD appointment is 25.      BILLING SUMMARY:  Total treatment time: 37 minutes (including 4 minutes of chart review and preparation, interpretation of testing and therapeutic maneuvers, and document writing)  Speech Pathology Treatment (65135)      Lizzie Huffman MS CCC-SLP  Speech-Language Pathologist  [facility]  Department of Otolaryngology - Head and Neck Surgery  [facility]  [email or web address]  Direct: [phone]  [phone]    *This note may have been completed using [id number] dictation software, so errors may exist. Please contact me for clarification if needed*

## 2025-04-29 NOTE — LETTER
4/29/2025       RE: Anuradha Brown  [address] [address]     Dear Colleague,    Thank you for referring your patient, Anuradha Brown, to the [facility] VOICE CLINIC [place] at [facility]. Please see a copy of my visit note below.    [facility] VOICE [facility]  VOICE / UPPER AIRWAY TREATMENT NOTE (CPT 35727)      Patient's name: Anuradha Brown  Date of Session: 4/29/2025   Providing SLP: Lizzie Huffman MS CCC-SLP  Referring Provider: Haylie Polo MD  Insurance Coverage: [employer]  Total # of SLP Visits: 3  # of SLP Therapy Sessions: 2  Session Location: Kojo was seen via telehealth today.     The patient has been notified and verbally consented to the following statements:   This video visit will be conducted between you and your provider.  Patient has opted to conduct today's video visit vs an in-person appointment, and is not able to attend due to possible exposure to COVID-19.    If during the course of the call the provider feels a video visit is not appropriate, you will not be charged for this service.     Provider has received verbal consent for billable virtual visit from the patient? 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 Laryngoscopy revealed mod-severe 4-way constrictive supraglottic hyperfunction with speech, mild-moderate redness and fullness of the arytenoids/posterior cricoid region, mild mucus collection/banding in the laryngopharynx,  and slightly red/full vocal folds bilaterally with slightly irregular margins, small prominence noted at mid-length of left vocal fold. Slightly reduced right-sided amplitude and normal mucosal wave, mildly reduced left-sided amplitude with slightly reduced mucosal wave. Overall good periodicity and symmetry, with very slight anterior gap noted intermittently between typically pressed closure. She perceptually demonstrated mild-moderate roughness, mild strain, and lower than normal FO, non-optimal respiratory mechanics, and occasional throat clearing/cough with tenderness of the thyrohyoid space. She would benefit from a course of speech therapy targeting reduced laryngeal irritation (PND), improved respiratory mechanics, reduced perilaryngeal hyperfunction, and phonatory patterns targeting reduced glottic impact and laryngeal hyperfunction. A course of speech therapy is recommended to improve voice quality and promote reduced discomfort, effort and fatigue. 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 She demonstrates a Good prognosis for improvement given adherence to therapeutic recommendations.        SUBJECTIVE:  Things are the same  Got a portable nebulizer  Increasing to daily use rather than as needed  No difference  No increase with allergies  Deals with throat mucus daily and it interrupts her voice until she coughs it up  Peak flows are the same      OBJECTIVE/ASSESSMENT:        3/28/2023     2:01 PM 5/18/2023    10:59 PM 8/7/2023     7:52 AM   Voice Handicap Index (VHI-10)   My voice makes it difficult for people to hear me 2  2 2   People have difficulty understanding me in a noisy room 2  2 2   My voice difficulties restrict my personal and social life.  0  0 1   I feel left out of conversations because of my voice 0  0 1   My voice problem causes me to lose income 0  0 0   I feel as though I have to strain to produce voice 2  2 2   The clarity of my voice is unpredictable 2  2 2   My voice problem upsets  "me 1  0 2   My voice makes me feel handicapped 1  0 0   People ask, \"What's wrong with your voice?\" 2  0 1   VHI-10 12 8 13       Proxy-reported         8/7/2023     7:52 AM 11/6/2024     9:50 AM 3/11/2025     8:44 AM   Cough Severity Index (CSI)   My cough is worse when I lie down 0 0 0   My coughing problem causes me to restrict my personal and social life 0 0 0   I tend to avoid places because of my cough problem 0 0 0   I feel embarrassed because of my coughing problem 2 2 2   People ask, ''What's wrong?'' because I cough a lot 0 0 3   I run out of air when I cough 0 0 2   My coughing problem affects my voice 2 2 3   My coughing problem limits my physical activity 0 0 1   My coughing problem upsets me 3 2 2   People ask me if I am sick because I cough a lot 0 0 2   CSI Score 7 6  15        Patient-reported         3/28/2023     2:02 PM   Eating Assessment Tool (EAT-10)   My swallowing problem has caused me to lose weight 0    My swallowing problem interferes with my ability to go out for meals 0    Swallowing liquids takes extra effort 0    Swallowing solids takes extra effort 0    Swallowing pills takes extra effort 0    Swallowing is painful 0    The pleasure of eating is affected by my swallowing 0    When I swallow food sticks in my throat 0    I cough when I eat 0    Swallowing is stressful 0    EAT-10 0       Proxy-reported         3/23/2023    10:54 AM   Dyspnea Index   1. I have trouble getting air in. 1   2. I feel tightness in my throat when I am having boni breathing problem. 2   3. It takes more effort to breathe than it used to. 3   4. Change in weather affect my breathing problem. 3   5. My breathing gets worse with stress. 2   6. I make sound/noise breathing in 1   7. I have to strain to breathe. 2   8. My shortness of breath gets worse with exercise or physical activity 4   9. My breathing problem makes me feel stressed. 2   10. My breathing problem casuses me to restrict my personal and social " life. 2   Dyspnea Index Total Score 22       THERAPEUTIC ACTIVITIES:  Vocal hygiene concepts were discussed with the patient to optimize vocal health. Systemic and topical hydration was emphasized.  Kojo did increase to using her portable nebulizer daily and denies a noticeable difference in her throat mucus.  At this time, it has been recommended that she increase to twice daily to better understand if this is a beneficial treatment.  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  Clinician answered questions regarding how to breathe while using the nebulizer, as well as potential increased benefits from twice daily use.  Kojo will incorporate this.  Because her mucus is likely secondary to subglottic stenosis and mucus sticking to that shelf, no additional speech therapy is recommended.       SLP PLAN IN MULTIDISCIPLINARY CLINIC:  Voice SLP presence at next appointment with laryngologist (e.g. Dr. Polo, Dr. Rahman, Dr. Duncan) is likely not needed, as this will be a standard checkup for her subglottic stenosis. Next scheduled MD appointment is 25.      BILLING SUMMARY:  Total treatment time: 37 minutes (including 4 minutes of chart review and preparation, interpretation of testing and therapeutic maneuvers, and document writing)  Speech Pathology Treatment ([id number])      Lizzie Huffman MS CCC-SLP  Speech-Language Pathologist  [facility]  Department of Otolaryngology - Head and Neck Surgery  [facility]  [email or web address]  Direct: [phone]  [phone]    *This note may have been completed using [id number] dictation software, so errors may exist. Please contact me for clarification if  needed*      Again, thank you for allowing me to participate in the care of your patient.      Sincerely,    Lizzie Huffman, SLP

## 2025-06-09 NOTE — PROGRESS NOTES
[facility]   at the [facility]   Otolaryngology Clinic     Patient: Anuradha Brown    MRN: [id number]    : 1980    Age/Gender: 44 year old female  Date of Service: 6/10/2025  Rendering Provider:   Haylie Polo MD         Impression & Plan     IMPRESSION: Ms. Brown is a 44 year old female who is being seen for the following:    Dyspnea  - due to subglottic stenosis   - no intubation history   - has diabetes, on Ozempic, most recent A1C is 6.5 (22)  - denies history of autoimmune disease  - CT chest 23 normal  - PFTs 23 is abnormal, stridor heard on exam  - denies reflux   - BMI is 40  - patient symptoms with shortness of breath with exertion and prolonged talking, one severe episode woke her from sleep and resulted in ER visit, on steroid inhalers but doesn't help  - scope shows grade 3 80% stenosis  - discussed etiology of trauma (intubation), autoimmune, diabetes or idiopathic. In this case this is most likely idiopathic given no prior intubation, but also has component of diabetes, need to rule out autoimmune issues  - discussed treatment with observation, conservative management with oral abx/steroids/reflux precautions, steroid injections, endoscopic procedures, open resection and bypass procedure with tracheotomy  - patient elects to proceed with endoscopic surgery  - s/p CO2 laser, CRE balloon dilation, kenalog injection 23  - symptoms 2023 are improved breathing with average peak flow of 300 and max of 350, peak flow today is 350, has mucus sensation with need to cough and throat clear, hoarse voice with prolonged talking, walked up 3 flights of stairs yesterday  - scope shows grade 1 5% narrowing, mucus in subglottic space  - given mucus sensation, recommend nebulizer or Mucinex, has daughter's nebulizer  - recommend voice therapy  - discussed observation with increased physical activity/weight management and close monitoring of diabetes versus  "conservative management with oral abx/steroids/reflux precautions versus steroid injections  - patient elects observation   - symptoms 9/21/2023 are stable, peak flow is 350, still has phlegm, still working on glucose, not checking too often  - scope shows stable subglottic opening, grade 1, 5% narrowing, mucus   - symptoms 7/9/2024 are stable, peak flow is still 350, not noticing more difficulty  - scope shows grade 1, 30% narrowing  - discussed options of observation, steroid inhaler, steroid injection  - she will think about her options and let me know   - has a nebulizer through her daughters  - symptoms 11/4/2024 are worse. Difficulty breathing with stairs and some talking, feels more mucus. Peak flow between 250 and 300.   - has been using the nebulizer  - scope shows grade 1, 40% narrowing  - had patient work with therapist to rule out vocal fold dysfunction, pt does not have vocal fold syfunction, but has sig strider  - discussed would recommend endoscopic surgery at this given severity of symptoms  - risks include but not limited to bleeding, infection, reaction to the anesthesia, damage to adjacent structures lips, teeth, tongue, larynx, pharynx, \"numb tongue, altered taste, TMJ syndrome, dental injury\", hoarseness, breathing, or swallowing problems  - given a laser can be used risk of an airway fire is also possible  - risk from the dilation include tracheal tear   - s/p MDL, bronch, CO2 laser, resection of stenosis, CRE balloon dilation to 15mmHg, steroid injection 11/20/24  - symptoms 12/19/2024 are improved in terms of her breathing, peak flow meter 350 today, voice is still bothersome  - scope shows grade 1, 5% narrowing with mild mucus in the subglottis  - discussed maintenance option of observation, timed follow up, long term antibiotic treatment, steroid inhaler, reflux control and steroid injections  - patient concerned about steroid injection, facebook group with stories about complications   - " dropped HgA1c to 5.7 and BMI down to 36 - discussed these are very important and helpful results  - patient elects to proceed with steroid inhaler, she did not use this post surgery she thought this was only if she had breathing problems    - symptoms 6/10/2025 are throat clearing, increased mucus sensation, throat clearing, breathing is overall stable peak flow meter still at 350, had voice therapy which she found helpful  - scope shows grade 1-10% narrowing mild redness in the subglottis  - Discussed improving mucus with Mucinex, discussed adding referral to GI to rule out mucus production due to laryngopharyngeal reflux and and trial of antibiotic  - Also discussed addition of steroid injection  - Discussed continuing saline nebulizer and stopping steroid inhaler at this time  - Patient would like to proceed with steroid injection  - s/p steroid injection done today 6/10/25   - Discussed   reevaluating in 4 to 6 weeks  Plan  - restart using saline nebulizer for the mucus plugs  - Okay to stop steroid inhaler  - Start Mucinex  - GI referral for reflux  - Azithromycin for 1 month  - Patient to reach out with updated peak flow meter values        2. Dysphonia  -  ongoing for a while    - speaking voice is affected  - no pain with voice use  - voice demand is high  - voice goes away with increased talking  - strobe shows supraglottic hyperfunction left more than right, medial edge thickening with hypervascularity on the left  - discussed symptoms 12/19/24 due to mid edge vocal fold thickening with secondary muscle tension  - had voice therapy  Plan  - Observation    RETURN VISIT: 4 to 6 weeks    Chief Complaint   Subglottic stenosis  Dyspnea  S/p CO2 laser, CRE balloon dilation, kenalog injection 4/19/23  S/p microlaryngoscopy, flex bronch, CO2 laser, resection of stenosis, CRE balloon dilation, steroid injection 11/20/24  Interval History   HISTORY OF PRESENT ILLNESS: Ms. Brown is a 42 year old female is being  followed for dyspnea. She was initially seen on 3/28/23. Please refer to this note for full history.     Today, she presents for follow up. she reports:  - Persistent mucus that is bothering her significantly  - Breathing is stable        PAST MEDICAL HISTORY:   Past Medical History:   Diagnosis Date    Arthritis     Diabetes (H) 11/2021    Other and unspecified ovarian cyst 09/23/1998    ultrasound done    Uncomplicated asthma 11/2021       PAST SURGICAL HISTORY:   Past Surgical History:   Procedure Laterality Date    BRONCHOSCOPY FLEXIBLE N/A 4/19/2023    Procedure: Bronchoscopy flexible and biopsy;  Surgeon: Haylie Polo MD;  Location: [facility] OR    INJECT STEROID (LOCATION) N/A 4/19/2023    Procedure: steroid injection;  Surgeon: Haylie Polo MD;  Location: [facility] OR    INJECT STEROID (LOCATION) N/A 11/20/2024    Procedure: steroid injection;  Surgeon: Haylie Polo MD;  Location: [facility] OR    LASER CO2 LARYNGOSCOPY N/A 4/19/2023    Procedure: carbon dioxide laser resection of stenosis, CRE balloon dilation;  Surgeon: Haylie Polo MD;  Location: [facility] OR    LASER CO2 LARYNGOSCOPY, COMPLEX N/A 11/20/2024    Procedure: MICROLARYNGOSCOPY, flexible bronchoscopy, carbon dioxide laser resection of stenosis, CRE balloon dilation,;  Surgeon: Haylie Polo MD;  Location: [facility] OR       CURRENT MEDICATIONS:   Current Outpatient Medications:     ACCU-CHEK GUIDE test strip, test TWICE DAILY, Disp: 200 strip, Rfl: 3    blood glucose monitoring (SOFTCLIX) lancets, Use to test blood sugar 1x  times daily or as directed., Disp: 200 each, Rfl: 3    cetirizine (ZYRTEC) 10 MG tablet, Take 1 tablet (10 mg) by mouth daily., Disp: 14 tablet, Rfl: 1    levonorgestrel (MIRENA) 52 MG (20 mcg/day) IUD, by Intrauterine route once., Disp: , Rfl:     predniSONE (DELTASONE) 20 MG tablet, Take 1 tablet (20 mg) by mouth daily., Disp: 7 tablet, Rfl: 0    sodium chloride 0.9 % neb solution, Take 5 mLs by nebulization as needed for other (use in nebulizer  prevent mucus plugging and difficulty breathing as needed up to 4 times daily)., Disp: 600 mL, Rfl: 5    STATIN NOT PRESCRIBED (INTENTIONAL), Please choose reason not prescribed from choices below. Patient wants to wait a year., Disp: , Rfl:     tirzepatide (MOUNJARO) 12.5 MG/0.5ML SOAJ auto-injector pen, Inject 12.5 mg subcutaneously every 7 days, Disp: 2 mL, Rfl: 2    ALLERGIES: Patient has no known allergies.    SOCIAL HISTORY:    Social History     Socioeconomic History    Marital status:      Spouse name: Not on file    Number of children: Not on file    Years of education: Not on file    Highest education level: Not on file   Occupational History    Not on file   Tobacco Use    Smoking status: Never     Passive exposure: Never    Smokeless tobacco: Never   Vaping Use    Vaping status: Never Used   Substance and Sexual Activity    Alcohol use: Yes     Comment: once a month    Drug use: Never    Sexual activity: Yes     Partners: Male     Birth control/protection: None     Comment: I want to talk about getting an ablation.  cramps bad   Other Topics Concern    Parent/sibling w/ CABG, MI or angioplasty before 65F 55M? No   Social History Narrative    Not on file     Social Drivers of Health     Financial Resource Strain: Low Risk  (1/16/2025)    Financial Resource Strain     Within the past 12 months, have you or your family members you live with been unable to get utilities (heat, electricity) when it was really needed?: No   Food Insecurity: Low Risk  (1/16/2025)    Food Insecurity     Within the past 12 months, did you worry that your food would run out before you got money to buy more?: No     Within the past 12 months, did the food you bought just not last and you didn t have money to get more?: No   Transportation Needs: Low Risk  (1/16/2025)    Transportation Needs     Within the past 12 months, has lack of transportation kept you from medical appointments, getting your medicines, non-medical  meetings or appointments, work, or from getting things that you need?: No   Physical Activity: Insufficiently Active (1/16/2025)    Exercise Vital Sign     Days of Exercise per Week: 3 days     Minutes of Exercise per Session: 30 min   Stress: No Stress Concern Present (1/16/2025)    [place] [employer] - Occupational Stress Questionnaire     Feeling of Stress : Only a little   Social Connections: Unknown (1/16/2025)    Social Connection and Isolation Panel [NHANES]     Frequency of Communication with Friends and Family: Not on file     Frequency of Social Gatherings with Friends and Family: Once a week     Attends Hoahaoism Services: Not on file     Active Member of Clubs or Organizations: Not on file     Attends Club or Organization Meetings: Not on file     Marital Status: Not on file   Interpersonal Safety: Low Risk  (1/16/2025)    Interpersonal Safety     Do you feel physically and emotionally safe where you currently live?: Yes     Within the past 12 months, have you been hit, slapped, kicked or otherwise physically hurt by someone?: No     Within the past 12 months, have you been humiliated or emotionally abused in other ways by your partner or ex-partner?: No   Housing Stability: Low Risk  (1/16/2025)    Housing Stability     Do you have housing? : Yes     Are you worried about losing your housing?: No         FAMILY HISTORY:   Family History   Problem Relation Age of Onset    Diabetes Maternal Half-Brother     Thyroid Disease Maternal Half-Brother     Thyroid Disease Maternal Half-Sister       Non-contributory for problems with anesthesia    REVIEW OF SYSTEMS:   The patient was asked a 14 point review of systems regarding constitutional symptoms, eye symptoms, ears, nose, mouth, throat symptoms, cardiovascular symptoms, respiratory symptoms, gastrointestinal symptoms, genitourinary symptoms, musculoskeletal symptoms, integumentary symptoms, neurological symptoms, psychiatric symptoms,  endocrine symptoms, hematologic/lymphatic symptoms, and allergic/ immunologic symptoms.   The pertinent factors have been included in the HPI and below.  Patient Supplied Answers to Review of Systems      3/28/2023     1:49 PM    ENT ROS   Psychology Frequently feeling anxious   Cardiopulmonary Cough    Breathing problems    Wheezing       Physical Examination   The patient underwent a physical examination as described below. The pertinent positive and negative findings are summarized after the description of the examination.  Constitutional: The patient's developmental and nutritional status was assessed. The patient's voice quality was assessed.  Head and Face: The head and face were inspected for deformities. The sinuses were palpated. The salivary glands were palpated. Facial muscle strength was assessed bilaterally.  Eyes: Extraocular movements and primary gaze alignment were assessed.  Ears, Nose, Mouth and Throat: The ears and nose were examined for deformities. The nasal septum, mucosa, and turbinates were inspected by anterior rhinoscopy. The lips, teeth, and gums were examined for abnormalities. The oral mucosa, tongue, palate, tonsils, lateral and posterior pharynx were inspected for the presence of asymmetry or mucosal lesions.    Neck: The tracheal position was noted, and the neck mass palpated to determine if there were any asymmetries, abnormal neck masses, thyromegally, or thyroid nodules.  Respiratory: The nature of the breathing and chest expansion/symmetry was observed.  Cardiovascular: The patient was examined to determine the presence of any edema or jugular venous distension.  Abdomen: The contour of the abdomen was noted.  Lymphatic: The patient was examined for infraclavicular lymphadenopathy.  Musculoskeletal: The patient was inspected for the presence of skeletal deformities.  Extremities: The extremities were examined for any clubbing or cyanosis.  Skin: The skin was examined for  inflammatory or neoplastic conditions.  Neurologic: The patient's orientation, mood, and affect were noted. The cranial nerve  functions were examined.  Other pertinent positive and negative findings on physical examination:   OC/OP: no lesions, uvula midline, soft palate elevates symmetrically   Neck: no lesions, no TH tenderness to palpation     All other physical examination findings were within normal limits and noncontributory.    Procedures   Video Laryngoscopy with Stroboscopy (CPT 40026)    Preoperative Diagnosis:  Dysphonia and throat symptoms  Postoperative Diagnosis: Dysphonia and throat symptoms  Indication:  Patient has new or persistent dysphonia and throat symptoms.  This requires evaluation by stroboscopy to fully delineate the laryngeal functioning; especially mucosal wave assessment, ultrasharp visualization of lesions on the vocal folds, and overall functioning of the larynx.  Details of Procedure: A 70 degree rigid telescopic laryngoscope or a distal chip flexible scope was used. The lighting was obtained via a light cable connected to a stroboscopic unit. The telescope was inserted either transorally or transnasally until the vocal folds could be visualized. The patient was instructed to sustain the vowel  ee  at a comfortable pitch and loudness as the voice was monitored by a microphone connected to a fundamental frequency tracker. This circuit tracked vocal periodicity, allowing the light to flash in synchrony with the glottal cycles. A setting on the stroboscope was set to change the phase of light strobing with relation to the vocal fundamental frequency, producing an image of 1 to 2 glottal cycles every second. The video images were recorded for analysis. Use of the variable speed, slow and stop scan allowed careful study of pertinent segments of laryngeal function to increase accuracy of clinical assessments of function and dysfunction.  In particular, features of glottal closure, mucosal  wave on the vocal fold cover and laryngeal symmetry were analyzed. Lastly, the patient was asked to phonate speech samples and auditory/perceptual evaluation of voice and resonance were performed.  The vocal quality was carefully evaluated for hoarseness, breathiness, loudness, phrase length and intelligibility to determine the source of dysphonia.     Findings:       B. LARYNGOVIDEOSTROBOSCOPY   Anatomic/Physiological Deviations:  RNC, grade 1-10% narrowing mild redness in the subglottis this to fully  Mucosal wave: Right:     No restriction                             Left:     No restriction  Bilateral Vocal Fold Vibration: Symmetric  Vocal Process: Right:     No restriction    Left:    No restriction  Vocal Fold closure: Complete glottal closure     Complication(s): None  Disposition: Patient tolerated the procedure well        Bronchoscopy with treatment of stenosis (58787)    Pre-Procedure Diagnosis: Tracheal stenosis  Post Procedure Diagnosis: Tracheal stenosis  Indication for procedure: Ms. 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 The stenosis is being treated with an office procedure today.   Procedure(s): Bronchoscopy with treatment of stenosis  Details of Procedure: Topical anesthesia was achieved by spraying 4% topical lidocaine directly into the trachea either through a working channel in the endoscope or via a trans-cutaneous route.  After adequate anesthesia was achieved a flexible bronchoscope was passed through the glottis into the trachea.  Abnormalities were noted.  The eden was visualized, followed by further insertion of the scope to the main stem bronchus level to evaluate the bronchi as well as the orifices of the sub-segmental bronchi.  Then, Kenalog steroid was injected into the stenosis in several quadrants. Having completed this the operation was completed and the scope withdrawn atraumatically.   Anesthesia type: 4% topical lidocaine    Findings:   Grade 1, 10%  "stenosis seen today. This was stable from last assessment. RNC, 2% lidocaine  >1.0 cc of Kenalog administered  >Kiara and Mainstem Bronchi: unremarkable    Estimated Blood Loss: None  Complication(s): None  Disposition: Patient tolerated the procedure well        Review of Relevant Clinical Data   I personally reviewed:  Notes:      04/29/2025 Lizzie Huffman SLP  SUBJECTIVE:  Things are the same  Got a portable nebulizer  Increasing to daily use rather than as needed  No difference  No increase with allergies  Deals with throat mucus daily and it interrupts her voice until she coughs it up  Peak flows are the same    IMPRESSIONS/PLAN:   Dysphonia (R49.0) in the context of Subglottic Stenosis (J38.6) and Laryngeal Hyperfunction (J38.7)     Kojo denies any improvement in her coughing with increasing her nebulizers from as needed to daily use.  Clinician answered questions regarding how to breathe while using the nebulizer, as well as potential increased benefits from twice daily use.  Kojo will incorporate this.  Because her mucus is likely secondary to subglottic stenosis and mucus sticking to that shelf, no additional speech therapy is recommended.  ___________________  Labs:  Lab Results   Component Value Date    TSH 1.06 08/13/2021     Lab Results   Component Value Date     11/15/2024    CO2 26 11/15/2024    BUN 17.2 11/15/2024     Lab Results   Component Value Date    WBC 5.8 11/15/2024    HGB 14.5 11/15/2024    HCT 44.1 11/15/2024    MCV 90 11/15/2024     11/15/2024     No results found for: \"PT\", \"PTT\", \"INR\"  No results found for: \"DRAKE\"  No components found for: \"RHEUMATOIDFACTOR\", \"RF\"  No results found for: \"CRP\"  No components found for: \"CKTOT\", \"URICACID\"  No components found for: \"C3\", \"C4\", \"DSDNAAB\", \"NDNAABIFA\"  No results found for: \"MPOAB\"    Patient reported Quality of Life (QOL) Measures   Patient Supplied Answers To VHI Questionnaire      8/7/2023     7:52 AM   Voice Handicap " "Index (VHI-10)   My voice makes it difficult for people to hear me 2   People have difficulty understanding me in a noisy room 2   My voice difficulties restrict my personal and social life.  1   I feel left out of conversations because of my voice 1   My voice problem causes me to lose income 0   I feel as though I have to strain to produce voice 2   The clarity of my voice is unpredictable 2   My voice problem upsets me 2   My voice makes me feel handicapped 0   People ask, \"What's wrong with your voice?\" 1   VHI-10 13         Patient Supplied Answers To EAT Questionnaire      3/28/2023     2:02 PM   Eating Assessment Tool (EAT-10)   My swallowing problem has caused me to lose weight 0     My swallowing problem interferes with my ability to go out for meals 0     Swallowing liquids takes extra effort 0     Swallowing solids takes extra effort 0     Swallowing pills takes extra effort 0     Swallowing is painful 0     The pleasure of eating is affected by my swallowing 0     When I swallow food sticks in my throat 0     I cough when I eat 0     Swallowing is stressful 0     EAT-10 0        Proxy-reported    Data saved with a previous flowsheet row definition         Patient Supplied Answers To CSI Questionnaire      3/11/2025     8:44 AM   Cough Severity Index (CSI)   My cough is worse when I lie down 0   My coughing problem causes me to restrict my personal and social life 0   I tend to avoid places because of my cough problem 0   I feel embarrassed because of my coughing problem 2   People ask, ''What's wrong?'' because I cough a lot 3   I run out of air when I cough 2   My coughing problem affects my voice 3   My coughing problem limits my physical activity 1   My coughing problem upsets me 2   People ask me if I am sick because I cough a lot 2   CSI Score 15        Patient-reported         Patient Supplied Answers to Dyspnea Index Questionnaire:      3/23/2023    10:54 AM   Dyspnea Index   1. I have trouble " getting air in. 1   2. I feel tightness in my throat when I am having boni breathing problem. 2   3. It takes more effort to breathe than it used to. 3   4. Change in weather affect my breathing problem. 3   5. My breathing gets worse with stress. 2   6. I make sound/noise breathing in 1   7. I have to strain to breathe. 2   8. My shortness of breath gets worse with exercise or physical activity 4   9. My breathing problem makes me feel stressed. 2   10. My breathing problem casuses me to restrict my personal and social life. 2   Dyspnea Index Total Score 22            Haylie Polo MD    Laryngology    [facility]  Department of  Otolaryngology - Head and Neck Surgery  Clinics & [place] [address]  Appointment line: [phone]  Fax: [phone]  [email or web address]

## 2025-06-10 ENCOUNTER — OFFICE VISIT (OUTPATIENT)
Dept: OTOLARYNGOLOGY | Facility: CLINIC | Age: 45
End: 2025-06-10
Payer: COMMERCIAL

## 2025-06-10 VITALS
OXYGEN SATURATION: 98 % | BODY MASS INDEX: 35.3 KG/M2 | HEART RATE: 80 BPM | HEIGHT: 61 IN | WEIGHT: 187 LBS | DIASTOLIC BLOOD PRESSURE: 65 MMHG | SYSTOLIC BLOOD PRESSURE: 101 MMHG

## 2025-06-10 DIAGNOSIS — J38.6 SGS (SUBGLOTTIC STENOSIS): ICD-10-CM

## 2025-06-10 DIAGNOSIS — R49.0 DYSPHONIA: Primary | ICD-10-CM

## 2025-06-10 DIAGNOSIS — K21.9 GASTROESOPHAGEAL REFLUX DISEASE, UNSPECIFIED WHETHER ESOPHAGITIS PRESENT: ICD-10-CM

## 2025-06-10 PROCEDURE — 3074F SYST BP LT 130 MM HG: CPT | Performed by: OTOLARYNGOLOGY

## 2025-06-10 PROCEDURE — 99214 OFFICE O/P EST MOD 30 MIN: CPT | Mod: 25 | Performed by: OTOLARYNGOLOGY

## 2025-06-10 PROCEDURE — 3078F DIAST BP <80 MM HG: CPT | Performed by: OTOLARYNGOLOGY

## 2025-06-10 PROCEDURE — 31641 BRONCHOSCOPY TREAT BLOCKAGE: CPT | Performed by: OTOLARYNGOLOGY

## 2025-06-10 RX ORDER — TRIAMCINOLONE ACETONIDE 40 MG/ML
40 INJECTION, SUSPENSION INTRA-ARTICULAR; INTRAMUSCULAR ONCE
Status: COMPLETED | OUTPATIENT
Start: 2025-06-10 | End: 2025-06-10

## 2025-06-10 RX ORDER — AZITHROMYCIN 250 MG/1
250 TABLET, FILM COATED ORAL DAILY
Qty: 30 TABLET | Refills: 0 | Status: SHIPPED | OUTPATIENT
Start: 2025-06-10

## 2025-06-10 RX ADMIN — TRIAMCINOLONE ACETONIDE 40 MG: 40 INJECTION, SUSPENSION INTRA-ARTICULAR; INTRAMUSCULAR at 10:59

## 2025-06-10 NOTE — PATIENT INSTRUCTIONS
You were seen in the ENT Clinic today by Dr. Polo. If you have any questions or concerns after your appointment, please contact us (see below)    2.   Please return to clinic in 4 to 6 weeks.     3.   A referral to GI will be placed for reflux workup.     4.   Trial Mucinex - plain    5.   A prescription for azithromycin will be sent to your pharmacy, please use as prescribed.        How to Contact Us:  Send a [employer] message to your provider. Our team will respond to you via Internet RE[employer]. Occasionally, we will need to call you to get further information.  For urgent matters (Monday-Friday), call the ENT Clinic: [phone] and speak with a call center team member - they will route your call appropriately.   If you'd like to speak directly with a nurse, please find our contact information below. We do our best to check voicemail frequently throughout the day, and will work to call you back within 1-2 days. For urgent matters, please use the general clinic phone numbers listed above.      Hilary HERNANDEZ RN  ENT RN Care Coordinator  Direct: [phone]    Lay BROWN LPN  Direct: [phone]

## 2025-06-10 NOTE — LETTER
6/10/2025       RE: Anuradha Brown  [address] [facility] [address]     Dear Colleague,    Thank you for referring your patient, Anuradha Brown, to the [facility] [place] at [facility]. Please see a copy of my visit note below.        [facility]   at the [facility]   Otolaryngology Clinic     Patient: Anuradha Brown    MRN: [id number]    : 1980    Age/Gender: 44 year old female  Date of Service: 6/10/2025  Rendering Provider:   Haylie Polo MD         Impression & Plan     IMPRESSION: Ms. Brown is a 44 year old female who is being seen for the following:    Dyspnea  - due to subglottic stenosis   - no intubation history   - has diabetes, on Ozempic, most recent A1C is 6.5 (22)  - denies history of autoimmune disease  - CT chest 23 normal  - PFTs 23 is abnormal, stridor heard on exam  - denies reflux   - BMI is 40  - patient symptoms with shortness of breath with exertion and prolonged talking, one severe episode woke her from sleep and resulted in ER visit, on steroid inhalers but doesn't help  - scope shows grade 3 80% stenosis  - discussed etiology of trauma (intubation), autoimmune, diabetes or idiopathic. In this case this is most likely idiopathic given no prior intubation, but also has component of diabetes, need to rule out autoimmune issues  - discussed treatment with observation, conservative management with oral abx/steroids/reflux precautions, steroid injections, endoscopic procedures, open resection and bypass procedure with tracheotomy  - patient elects to proceed with endoscopic surgery  - s/p CO2 laser, CRE balloon dilation, kenalog injection 23  - symptoms 2023 are improved breathing with average peak flow of 300 and max of 350, peak flow today is 350, has mucus sensation with need to cough and throat clear, hoarse voice with prolonged talking,  "walked up 3 flights of stairs yesterday  - scope shows grade 1 5% narrowing, mucus in subglottic space  - given mucus sensation, recommend nebulizer or Mucinex, has daughter's nebulizer  - recommend voice therapy  - discussed observation with increased physical activity/weight management and close monitoring of diabetes versus conservative management with oral abx/steroids/reflux precautions versus steroid injections  - patient elects observation   - symptoms 9/21/2023 are stable, peak flow is 350, still has phlegm, still working on glucose, not checking too often  - scope shows stable subglottic opening, grade 1, 5% narrowing, mucus   - symptoms 7/9/2024 are stable, peak flow is still 350, not noticing more difficulty  - scope shows grade 1, 30% narrowing  - discussed options of observation, steroid inhaler, steroid injection  - she will think about her options and let me know   - has a nebulizer through her daughters  - symptoms 11/4/2024 are worse. Difficulty breathing with stairs and some talking, feels more mucus. Peak flow between 250 and 300.   - has been using the nebulizer  - scope shows grade 1, 40% narrowing  - had patient work with therapist to rule out vocal fold dysfunction, pt does not have vocal fold syfunction, but has sig strider  - discussed would recommend endoscopic surgery at this given severity of symptoms  - risks include but not limited to bleeding, infection, reaction to the anesthesia, damage to adjacent structures lips, teeth, tongue, larynx, pharynx, \"numb tongue, altered taste, TMJ syndrome, dental injury\", hoarseness, breathing, or swallowing problems  - given a laser can be used risk of an airway fire is also possible  - risk from the dilation include tracheal tear   - s/p MDL, bronch, CO2 laser, resection of stenosis, CRE balloon dilation to 15mmHg, steroid injection 11/20/24  - symptoms 12/19/2024 are improved in terms of her breathing, peak flow meter 350 today, voice is still " bothersome  - scope shows grade 1, 5% narrowing with mild mucus in the subglottis  - discussed maintenance option of observation, timed follow up, long term antibiotic treatment, steroid inhaler, reflux control and steroid injections  - patient concerned about steroid injection, facebook group with stories about complications   - dropped HgA1c to 5.7 and BMI down to 36 - discussed these are very important and helpful results  - patient elects to proceed with steroid inhaler, she did not use this post surgery she thought this was only if she had breathing problems    - symptoms 6/10/2025 are throat clearing, increased mucus sensation, throat clearing, breathing is overall stable peak flow meter still at 350, had voice therapy which she found helpful  - scope shows grade 1-10% narrowing mild redness in the subglottis  - Discussed improving mucus with Mucinex, discussed adding referral to GI to rule out mucus production due to laryngopharyngeal reflux and and trial of antibiotic  - Also discussed addition of steroid injection  - Discussed continuing saline nebulizer and stopping steroid inhaler at this time  - Patient would like to proceed with steroid injection  - s/p steroid injection done today 6/10/25   - Discussed   reevaluating in 4 to 6 weeks  Plan  - restart using saline nebulizer for the mucus plugs  - Okay to stop steroid inhaler  - Start Mucinex  - GI referral for reflux  - Azithromycin for 1 month  - Patient to reach out with updated peak flow meter values        2. Dysphonia  -  ongoing for a while    - speaking voice is affected  - no pain with voice use  - voice demand is high  - voice goes away with increased talking  - strobe shows supraglottic hyperfunction left more than right, medial edge thickening with hypervascularity on the left  - discussed symptoms 12/19/24 due to mid edge vocal fold thickening with secondary muscle tension  - had voice therapy  Plan  - Observation    RETURN VISIT: 4 to 6  weeks    Chief Complaint   Subglottic stenosis  Dyspnea  S/p CO2 laser, CRE balloon dilation, kenalog injection 4/19/23  S/p microlaryngoscopy, flex bronch, CO2 laser, resection of stenosis, CRE balloon dilation, steroid injection 11/20/24  Interval History   HISTORY OF PRESENT ILLNESS: Ms. Brown is a 42 year old female is being followed for dyspnea. She was initially seen on 3/28/23. Please refer to this note for full history.     Today, she presents for follow up. she reports:  - Persistent mucus that is bothering her significantly  - Breathing is stable        PAST MEDICAL HISTORY:   Past Medical History:   Diagnosis Date     Arthritis      Diabetes (H) 11/2021     Other and unspecified ovarian cyst 09/23/1998    ultrasound done     Uncomplicated asthma 11/2021       PAST SURGICAL HISTORY:   Past Surgical History:   Procedure Laterality Date     BRONCHOSCOPY FLEXIBLE N/A 4/19/2023    Procedure: Bronchoscopy flexible and biopsy;  Surgeon: Haylie Polo MD;  Location: [facility] OR     INJECT STEROID (LOCATION) N/A 4/19/2023    Procedure: steroid injection;  Surgeon: Haylie Polo MD;  Location: [facility] OR     INJECT STEROID (LOCATION) N/A 11/20/2024    Procedure: steroid injection;  Surgeon: Haylie Polo MD;  Location: [facility] OR     LASER CO2 LARYNGOSCOPY N/A 4/19/2023    Procedure: carbon dioxide laser resection of stenosis, CRE balloon dilation;  Surgeon: Haylie Polo MD;  Location: [facility] OR     LASER CO2 LARYNGOSCOPY, COMPLEX N/A 11/20/2024    Procedure: MICROLARYNGOSCOPY, flexible bronchoscopy, carbon dioxide laser resection of stenosis, CRE balloon dilation,;  Surgeon: Haylie Polo MD;  Location: [facility] OR       CURRENT MEDICATIONS:   Current Outpatient Medications:      ACCU-CHEK GUIDE test strip, test TWICE DAILY, Disp: 200 strip, Rfl: 3     blood glucose monitoring (SOFTCLIX) lancets, Use to test blood sugar 1x  times daily or as directed., Disp: 200 each, Rfl: 3     cetirizine (ZYRTEC) 10 MG tablet, Take 1 tablet (10 mg) by  mouth daily., Disp: 14 tablet, Rfl: 1     levonorgestrel (MIRENA) 52 MG (20 mcg/day) IUD, by Intrauterine route once., Disp: , Rfl:      predniSONE (DELTASONE) 20 MG tablet, Take 1 tablet (20 mg) by mouth daily., Disp: 7 tablet, Rfl: 0     sodium chloride 0.9 % neb solution, Take 5 mLs by nebulization as needed for other (use in nebulizer prevent mucus plugging and difficulty breathing as needed up to 4 times daily)., Disp: 600 mL, Rfl: 5     STATIN NOT PRESCRIBED (INTENTIONAL), Please choose reason not prescribed from choices below. Patient wants to wait a year., Disp: , Rfl:      tirzepatide (MOUNJARO) 12.5 MG/0.5ML SOAJ auto-injector pen, Inject 12.5 mg subcutaneously every 7 days, Disp: 2 mL, Rfl: 2    ALLERGIES: Patient has no known allergies.    SOCIAL HISTORY:    Social History     Socioeconomic History     Marital status:      Spouse name: Not on file     Number of children: Not on file     Years of education: Not on file     Highest education level: Not on file   Occupational History     Not on file   Tobacco Use     Smoking status: Never     Passive exposure: Never     Smokeless tobacco: Never   Vaping Use     Vaping status: Never Used   Substance and Sexual Activity     Alcohol use: Yes     Comment: once a month     Drug use: Never     Sexual activity: Yes     Partners: Male     Birth control/protection: None     Comment: I want to talk about getting an ablation.  cramps bad   Other Topics Concern     Parent/sibling w/ CABG, MI or angioplasty before 65F 55M? No   Social History Narrative     Not on file     Social Drivers of Health     Financial Resource Strain: Low Risk  (1/16/2025)    Financial Resource Strain      Within the past 12 months, have you or your family members you live with been unable to get utilities (heat, electricity) when it was really needed?: No   Food Insecurity: Low Risk  (1/16/2025)    Food Insecurity      Within the past 12 months, did you worry that your food would run  out before you got money to buy more?: No      Within the past 12 months, did the food you bought just not last and you didn t have money to get more?: No   Transportation Needs: Low Risk  (1/16/2025)    Transportation Needs      Within the past 12 months, has lack of transportation kept you from medical appointments, getting your medicines, non-medical meetings or appointments, work, or from getting things that you need?: No   Physical Activity: Insufficiently Active (1/16/2025)    Exercise Vital Sign      Days of Exercise per Week: 3 days      Minutes of Exercise per Session: 30 min   Stress: No Stress Concern Present (1/16/2025)    [place] [employer] - Occupational Stress Questionnaire      Feeling of Stress : Only a little   Social Connections: Unknown (1/16/2025)    Social Connection and Isolation Panel [NHANES]      Frequency of Communication with Friends and Family: Not on file      Frequency of Social Gatherings with Friends and Family: Once a week      Attends Confucianist Services: Not on file      Active Member of Clubs or Organizations: Not on file      Attends Club or Organization Meetings: Not on file      Marital Status: Not on file   Interpersonal Safety: Low Risk  (1/16/2025)    Interpersonal Safety      Do you feel physically and emotionally safe where you currently live?: Yes      Within the past 12 months, have you been hit, slapped, kicked or otherwise physically hurt by someone?: No      Within the past 12 months, have you been humiliated or emotionally abused in other ways by your partner or ex-partner?: No   Housing Stability: Low Risk  (1/16/2025)    Housing Stability      Do you have housing? : Yes      Are you worried about losing your housing?: No         FAMILY HISTORY:   Family History   Problem Relation Age of Onset     Diabetes Maternal Half-Brother      Thyroid Disease Maternal Half-Brother      Thyroid Disease Maternal Half-Sister       Non-contributory for  problems with anesthesia    REVIEW OF SYSTEMS:   The patient was asked a 14 point review of systems regarding constitutional symptoms, eye symptoms, ears, nose, mouth, throat symptoms, cardiovascular symptoms, respiratory symptoms, gastrointestinal symptoms, genitourinary symptoms, musculoskeletal symptoms, integumentary symptoms, neurological symptoms, psychiatric symptoms, endocrine symptoms, hematologic/lymphatic symptoms, and allergic/ immunologic symptoms.   The pertinent factors have been included in the HPI and below.  Patient Supplied Answers to Review of Systems      3/28/2023     1:49 PM    ENT ROS   Psychology Frequently feeling anxious   Cardiopulmonary Cough    Breathing problems    Wheezing       Physical Examination   The patient underwent a physical examination as described below. The pertinent positive and negative findings are summarized after the description of the examination.  Constitutional: The patient's developmental and nutritional status was assessed. The patient's voice quality was assessed.  Head and Face: The head and face were inspected for deformities. The sinuses were palpated. The salivary glands were palpated. Facial muscle strength was assessed bilaterally.  Eyes: Extraocular movements and primary gaze alignment were assessed.  Ears, Nose, Mouth and Throat: The ears and nose were examined for deformities. The nasal septum, mucosa, and turbinates were inspected by anterior rhinoscopy. The lips, teeth, and gums were examined for abnormalities. The oral mucosa, tongue, palate, tonsils, lateral and posterior pharynx were inspected for the presence of asymmetry or mucosal lesions.    Neck: The tracheal position was noted, and the neck mass palpated to determine if there were any asymmetries, abnormal neck masses, thyromegally, or thyroid nodules.  Respiratory: The nature of the breathing and chest expansion/symmetry was observed.  Cardiovascular: The patient was examined to determine  the presence of any edema or jugular venous distension.  Abdomen: The contour of the abdomen was noted.  Lymphatic: The patient was examined for infraclavicular lymphadenopathy.  Musculoskeletal: The patient was inspected for the presence of skeletal deformities.  Extremities: The extremities were examined for any clubbing or cyanosis.  Skin: The skin was examined for inflammatory or neoplastic conditions.  Neurologic: The patient's orientation, mood, and affect were noted. The cranial nerve  functions were examined.  Other pertinent positive and negative findings on physical examination:   OC/OP: no lesions, uvula midline, soft palate elevates symmetrically   Neck: no lesions, no TH tenderness to palpation     All other physical examination findings were within normal limits and noncontributory.    Procedures   Video Laryngoscopy with Stroboscopy (CPT 94181)    Preoperative Diagnosis:  Dysphonia and throat symptoms  Postoperative Diagnosis: Dysphonia and throat symptoms  Indication:  Patient has new or persistent dysphonia and throat symptoms.  This requires evaluation by stroboscopy to fully delineate the laryngeal functioning; especially mucosal wave assessment, ultrasharp visualization of lesions on the vocal folds, and overall functioning of the larynx.  Details of Procedure: A 70 degree rigid telescopic laryngoscope or a distal chip flexible scope was used. The lighting was obtained via a light cable connected to a stroboscopic unit. The telescope was inserted either transorally or transnasally until the vocal folds could be visualized. The patient was instructed to sustain the vowel  ee  at a comfortable pitch and loudness as the voice was monitored by a microphone connected to a fundamental frequency tracker. This circuit tracked vocal periodicity, allowing the light to flash in synchrony with the glottal cycles. A setting on the stroboscope was set to change the phase of light strobing with relation to the  vocal fundamental frequency, producing an image of 1 to 2 glottal cycles every second. The video images were recorded for analysis. Use of the variable speed, slow and stop scan allowed careful study of pertinent segments of laryngeal function to increase accuracy of clinical assessments of function and dysfunction.  In particular, features of glottal closure, mucosal wave on the vocal fold cover and laryngeal symmetry were analyzed. Lastly, the patient was asked to phonate speech samples and auditory/perceptual evaluation of voice and resonance were performed.  The vocal quality was carefully evaluated for hoarseness, breathiness, loudness, phrase length and intelligibility to determine the source of dysphonia.     Findings:       B. LARYNGOVIDEOSTROBOSCOPY   Anatomic/Physiological Deviations:  RNC, grade 1-10% narrowing mild redness in the subglottis this to fully  Mucosal wave: Right:     No restriction                             Left:     No restriction  Bilateral Vocal Fold Vibration: Symmetric  Vocal Process: Right:     No restriction    Left:    No restriction  Vocal Fold closure: Complete glottal closure     Complication(s): None  Disposition: Patient tolerated the procedure well        Bronchoscopy with treatment of stenosis (01714)    Pre-Procedure Diagnosis: Tracheal stenosis  Post Procedure Diagnosis: Tracheal stenosis  Indication for procedure: Ms. 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 The stenosis is being treated with an office procedure today.   Procedure(s): Bronchoscopy with treatment of stenosis  Details of Procedure: Topical anesthesia was achieved by spraying 4% topical lidocaine directly into the trachea either through a working channel in the endoscope or via a trans-cutaneous route.  After adequate anesthesia was achieved a flexible bronchoscope was passed through the glottis into the trachea.  Abnormalities were noted.  The eden was visualized, followed by  "further insertion of the scope to the main stem bronchus level to evaluate the bronchi as well as the orifices of the sub-segmental bronchi.  Then, Kenalog steroid was injected into the stenosis in several quadrants. Having completed this the operation was completed and the scope withdrawn atraumatically.   Anesthesia type: 4% topical lidocaine    Findings:   Grade 1, 10% stenosis seen today. This was stable from last assessment. RNC, 2% lidocaine  >1.0 cc of Kenalog administered  >Kiara and Mainstem Bronchi: unremarkable    Estimated Blood Loss: None  Complication(s): None  Disposition: Patient tolerated the procedure well        Review of Relevant Clinical Data   I personally reviewed:  Notes:      04/29/2025 Lizzie Huffman, TUNDE  SUBJECTIVE:  Things are the same  Got a portable nebulizer  Increasing to daily use rather than as needed  No difference  No increase with allergies  Deals with throat mucus daily and it interrupts her voice until she coughs it up  Peak flows are the same    IMPRESSIONS/PLAN:   Dysphonia (R49.0) in the context of Subglottic Stenosis (J38.6) and Laryngeal Hyperfunction (J38.7)     Kojo denies any improvement in her coughing with increasing her nebulizers from as needed to daily use.  Clinician answered questions regarding how to breathe while using the nebulizer, as well as potential increased benefits from twice daily use.  Kojo will incorporate this.  Because her mucus is likely secondary to subglottic stenosis and mucus sticking to that shelf, no additional speech therapy is recommended.  ___________________  Labs:  Lab Results   Component Value Date    TSH 1.06 08/13/2021     Lab Results   Component Value Date     11/15/2024    CO2 26 11/15/2024    BUN 17.2 11/15/2024     Lab Results   Component Value Date    WBC 5.8 11/15/2024    HGB 14.5 11/15/2024    HCT 44.1 11/15/2024    MCV 90 11/15/2024     11/15/2024     No results found for: \"PT\", \"PTT\", \"INR\"  No results " "found for: \"DRAKE\"  No components found for: \"RHEUMATOIDFACTOR\", \"RF\"  No results found for: \"CRP\"  No components found for: \"CKTOT\", \"URICACID\"  No components found for: \"C3\", \"C4\", \"DSDNAAB\", \"NDNAABIFA\"  No results found for: \"MPOAB\"    Patient reported Quality of Life (QOL) Measures   Patient Supplied Answers To VHI Questionnaire      8/7/2023     7:52 AM   Voice Handicap Index (VHI-10)   My voice makes it difficult for people to hear me 2   People have difficulty understanding me in a noisy room 2   My voice difficulties restrict my personal and social life.  1   I feel left out of conversations because of my voice 1   My voice problem causes me to lose income 0   I feel as though I have to strain to produce voice 2   The clarity of my voice is unpredictable 2   My voice problem upsets me 2   My voice makes me feel handicapped 0   People ask, \"What's wrong with your voice?\" 1   VHI-10 13         Patient Supplied Answers To EAT Questionnaire      3/28/2023     2:02 PM   Eating Assessment Tool (EAT-10)   My swallowing problem has caused me to lose weight 0     My swallowing problem interferes with my ability to go out for meals 0     Swallowing liquids takes extra effort 0     Swallowing solids takes extra effort 0     Swallowing pills takes extra effort 0     Swallowing is painful 0     The pleasure of eating is affected by my swallowing 0     When I swallow food sticks in my throat 0     I cough when I eat 0     Swallowing is stressful 0     EAT-10 0        Proxy-reported    Data saved with a previous flowsheet row definition         Patient Supplied Answers To CSI Questionnaire      3/11/2025     8:44 AM   Cough Severity Index (CSI)   My cough is worse when I lie down 0   My coughing problem causes me to restrict my personal and social life 0   I tend to avoid places because of my cough problem 0   I feel embarrassed because of my coughing problem 2   People ask, ''What's wrong?'' because I cough a lot 3   I run " out of air when I cough 2   My coughing problem affects my voice 3   My coughing problem limits my physical activity 1   My coughing problem upsets me 2   People ask me if I am sick because I cough a lot 2   CSI Score 15        Patient-reported         Patient Supplied Answers to Dyspnea Index Questionnaire:      3/23/2023    10:54 AM   Dyspnea Index   1. I have trouble getting air in. 1   2. I feel tightness in my throat when I am having boni breathing problem. 2   3. It takes more effort to breathe than it used to. 3   4. Change in weather affect my breathing problem. 3   5. My breathing gets worse with stress. 2   6. I make sound/noise breathing in 1   7. I have to strain to breathe. 2   8. My shortness of breath gets worse with exercise or physical activity 4   9. My breathing problem makes me feel stressed. 2   10. My breathing problem casuses me to restrict my personal and social life. 2   Dyspnea Index Total Score 22            Haylie Polo MD    Laryngology    [facility]  Department of  Otolaryngology - Head and Neck Surgery  [facility] [place] [address]  Appointment line: [phone]  Fax: [phone]  [email or web address]       Again, thank you for allowing me to participate in the care of your patient.      Sincerely,    Haylie Polo MD

## 2025-06-16 NOTE — TELEPHONE ENCOUNTER
REFERRAL INFORMATION:  Referring Provider:  Haylie Polo MD   Referring Clinic:  [facility]   Reason for Visit/Diagnosis:   R49.0 (ICD-10-CM) - Dysphonia   K21.9 (ICD-10-CM) - Esophageal reflux        FUTURE VISIT INFORMATION:  Appointment Date: 7/1/25     NOTES STATUS DETAILS   OFFICE NOTE from Referring Provider Internal 6/10/25, 12/19/24   OFFICE NOTE from Other Specialist Internal 4/29/25, 3/11/25-Lizzie Huffman SLP   MEDICATION LIST Internal    PROCEDURES     STOOL TESTING     LABS     PERTINENT LABS Internal    IMAGES     CT Internal 1/30/23-CT chest

## 2025-06-21 ENCOUNTER — HEALTH MAINTENANCE LETTER (OUTPATIENT)
Age: 45
End: 2025-06-21

## 2025-07-01 ENCOUNTER — PRE VISIT (OUTPATIENT)
Dept: GASTROENTEROLOGY | Facility: CLINIC | Age: 45
End: 2025-07-01

## 2025-07-01 ENCOUNTER — VIRTUAL VISIT (OUTPATIENT)
Dept: GASTROENTEROLOGY | Facility: CLINIC | Age: 45
End: 2025-07-01
Attending: OTOLARYNGOLOGY
Payer: COMMERCIAL

## 2025-07-01 VITALS — WEIGHT: 184 LBS | BODY MASS INDEX: 36.12 KG/M2 | HEIGHT: 60 IN

## 2025-07-01 DIAGNOSIS — R09.89 CHRONIC THROAT CLEARING: Primary | ICD-10-CM

## 2025-07-01 DIAGNOSIS — K21.9 GASTROESOPHAGEAL REFLUX DISEASE, UNSPECIFIED WHETHER ESOPHAGITIS PRESENT: ICD-10-CM

## 2025-07-01 DIAGNOSIS — J38.6 SUBGLOTTIC STENOSIS NOT DUE TO SURGERY: ICD-10-CM

## 2025-07-01 DIAGNOSIS — R49.0 DYSPHONIA: ICD-10-CM

## 2025-07-01 PROCEDURE — 98001 SYNCH AUDIO-VIDEO NEW LOW 30: CPT | Performed by: PHYSICIAN ASSISTANT

## 2025-07-01 PROCEDURE — 1126F AMNT PAIN NOTED NONE PRSNT: CPT | Mod: 95 | Performed by: PHYSICIAN ASSISTANT

## 2025-07-01 ASSESSMENT — PAIN SCALES - GENERAL: PAINLEVEL_OUTOF10: NO PAIN (0)

## 2025-07-01 NOTE — PROGRESS NOTES
"Virtual Visit Details    Type of service:  Video Visit     Originating Location (pt. Location): Home    Distant Location (provider location):  Off-site  Platform used for Video Visit: [facility]    Gastroenterology Visit for: Anuradha Brown 1980   MRN: [id number]     Reason for Visit:  chief complaint    Referred by: Christ  / Yanely [facility] / [facility] [id number]  Patient Care Team:  Inocente Gutierrez MD as PCP - General (Family Medicine)  Frw, None as PCP - Obstetrics/Gynecology  Inocente Gutierrez MD as Assigned PCP  Flor Staples, SLP as Speech Language Pathologist (Speech Language/Path)  Haylie Polo MD as Assigned Surgical Provider  Daniela Adhikari RD as Diabetes Educator (Dietitian, Registered)  Cha Le CNM as Midwife (OB/Gyn)  Bisi South MD as Assigned OBGYN Provider  Kristan Perla PA-C as Physician Assistant (Gastroenterology)    History of Present Illness:   Anuradha Brown is a 45 year old female with significant past medical history pertinent for depression, DM II, subglottic stenosis who is presenting as a new patient in consultation at the request of Dr. Polo with a chief complaint of dysphonia, phlegm and throat clearing.    -----------------------------------------------------------------------------------------------------------------------------------------------------------------------------------------------------------------------------------  HPI July 1, 2025:    Reflux - Denies.     Dysphonia/Throat Clearing - After the second intervention had developed concerns with the mucus. Described as the need to clear mucus as she becomes hoarse. Noting that once per day needs to expectorate phlegm/mucus. Was treated with ABX and the color of the mucus had changed noting it is no longer green and is clear.     When breathing explains \"I can feel it sometimes like that is something is there.\"     No relation to oral intake. Symptoms not consistent with " gustatory rhinitis.     Has not trialed daily consecutive use of antihistamines.     Has not trialed alginate product.     Denies weight loss, nausea, emesis, dysphagia, odynophagia, substernal chest pain, heartburn, regurgitation, waterbrash symptoms, dysgeusia, nocturnal awakenings with coughing/choking or gagging.     No allergy to Nickel.     No family history or GI related malignancy (esophageal, gastric, pancreatic, liver or colon).         Esophageal Questionnaire(s)    BEDQ Questionnaire      7/1/2025     9:44 AM   BEDQ Questionnaire: How Often Have You Had the Following?   Trouble eating solid food (meat, bread, vegetables) 0   Trouble eating soft foods (yogurt, jello, pudding) 0   Trouble swallowing liquids 0   Pain while swallowing 0   Coughing or choking while swallowing foods or liquids 0   Total Score: 0        Patient-reported         7/1/2025     9:44 AM   BEDQ Questionnaire: Discomfort/Pain Ratings   Eating solid food (meat, bread, vegetables) 0   Eating soft foods (yogurt, jello, pudding) 0   Drinking liquid 0   Total Score: 0        Patient-reported       Eckardt Questionnaire      7/1/2025     9:44 AM   Eckardt Questionnaire   Dysphagia 0   Regurgitation 0   Retrosternal Pain 0   Weight Loss (kg) 0   Total Score:  0        Patient-reported       Promis 10 Questionnaire      7/1/2025     9:49 AM   PROMIS 10 FLOWSHEET DATA   In general, would you say your health is: 3   In general, would you say your quality of life is: 4   In general, how would you rate your physical health? 2   In general, how would you rate your mental health, including your mood and your ability to think? 3   In general, how would you rate your satisfaction with your social activities and relationships? 4   In general, please rate how well you carry out your usual social activities and roles. (This includes activities at home, at work and in your community, and responsibilities as a parent, child, spouse, employee, friend,  etc.) 3   To what extent are you able to carry out your everyday physical activities such as walking, climbing stairs, carrying groceries, or moving a chair? 5   In the past 7 days, how often have you been bothered by emotional problems such as feeling anxious, depressed, or irritable? 2   In the past 7 days, how would you rate your fatigue on average? 3   In the past 7 days, how would you rate your pain on average, where 0 means no pain, and 10 means worst imaginable pain? 0   Mental health question re-calculation - no clinical value 4    Physical health question re-calculation - no clinical value 3    Pain question re-calculation - no clinical value 5    Global Mental Health Score 15    Global Physical Health Score 15    PROMIS TOTAL - SUBSCORES 30        Patient-reported           STUDIES & PROCEDURES:    EGD:       Colonoscopy:     EndoFLIP directed at the [facility] or LES (8cm (EF-325) balloon length or 16cm (EF-322) balloon length):   Date:  8cm balloon  Balloon inflation Balloon pressure CSA (mm^2) DI (mm^2/mmHg) Dmin (mm) Compliance   20 (ladmark ID)        30        40        50           16cm balloon  Balloon inflation Balloon pressure CSA (mm^2) DI (mm^2/mmHg) Dmin (mm) Compliance   30 (ladmark ID)        40        50        60        70           High Resolution Manometry:    PH/Impedance:     Bravo:    CT:    Esophagram:    FL VSS:     GES:    U/S:     XRAY:    Other:       Prior medical records were reviewed including, but not limited to, notes from referring providers, lab work, radiographic tests, and other diagnostic tests. Pertinent results were summarized above.     History     Past Medical History:   Diagnosis Date    Allergic rhinitis 2020    Arthritis     Diabetes (H) 11/2021    Hoarseness     Other and unspecified ovarian cyst 09/23/1998    ultrasound done    Pneumonia 2015    Uncomplicated asthma 11/2021       Past Surgical History:   Procedure Laterality Date    BRONCHOSCOPY FLEXIBLE N/A 4/19/2023     Procedure: Bronchoscopy flexible and biopsy;  Surgeon: Haylie Polo MD;  Location: [facility] OR    INJECT STEROID (LOCATION) N/A 4/19/2023    Procedure: steroid injection;  Surgeon: Haylie Polo MD;  Location: [facility] OR    INJECT STEROID (LOCATION) N/A 11/20/2024    Procedure: steroid injection;  Surgeon: Haylie Polo MD;  Location: [facility] OR    LASER CO2 LARYNGOSCOPY N/A 4/19/2023    Procedure: carbon dioxide laser resection of stenosis, CRE balloon dilation;  Surgeon: Haylie Polo MD;  Location: [facility] OR    LASER CO2 LARYNGOSCOPY, COMPLEX N/A 11/20/2024    Procedure: MICROLARYNGOSCOPY, flexible bronchoscopy, carbon dioxide laser resection of stenosis, CRE balloon dilation,;  Surgeon: Haylie Polo MD;  Location: [facility] OR       Social History     Socioeconomic History    Marital status:      Spouse name: Not on file    Number of children: Not on file    Years of education: Not on file    Highest education level: Not on file   Occupational History    Not on file   Tobacco Use    Smoking status: Never     Passive exposure: Never    Smokeless tobacco: Never   Vaping Use    Vaping status: Never Used   Substance and Sexual Activity    Alcohol use: Yes     Comment: once a month    Drug use: Never    Sexual activity: Yes     Partners: Male     Birth control/protection: None     Comment: I want to talk about getting an ablation.  cramps bad   Other Topics Concern    Parent/sibling w/ CABG, MI or angioplasty before 65F 55M? No   Social History Narrative    Not on file     Social Drivers of Health     Financial Resource Strain: Low Risk  (1/16/2025)    Financial Resource Strain     Within the past 12 months, have you or your family members you live with been unable to get utilities (heat, electricity) when it was really needed?: No   Food Insecurity: Low Risk  (1/16/2025)    Food Insecurity     Within the past 12 months, did you worry that your food would run out before you got money to buy more?: No     Within the past 12 months,  did the food you bought just not last and you didn t have money to get more?: No   Transportation Needs: Low Risk  (1/16/2025)    Transportation Needs     Within the past 12 months, has lack of transportation kept you from medical appointments, getting your medicines, non-medical meetings or appointments, work, or from getting things that you need?: No   Physical Activity: Insufficiently Active (1/16/2025)    Exercise Vital Sign     Days of Exercise per Week: 3 days     Minutes of Exercise per Session: 30 min   Stress: No Stress Concern Present (1/16/2025)    [place] [employer] - Occupational Stress Questionnaire     Feeling of Stress : Only a little   Social Connections: Unknown (1/16/2025)    Social Connection and Isolation Panel [NHANES]     Frequency of Communication with Friends and Family: Not on file     Frequency of Social Gatherings with Friends and Family: Once a week     Attends Tenriism Services: Not on file     Active Member of Clubs or Organizations: Not on file     Attends Club or Organization Meetings: Not on file     Marital Status: Not on file   Interpersonal Safety: Low Risk  (1/16/2025)    Interpersonal Safety     Do you feel physically and emotionally safe where you currently live?: Yes     Within the past 12 months, have you been hit, slapped, kicked or otherwise physically hurt by someone?: No     Within the past 12 months, have you been humiliated or emotionally abused in other ways by your partner or ex-partner?: No   Housing Stability: Low Risk  (1/16/2025)    Housing Stability     Do you have housing? : Yes     Are you worried about losing your housing?: No       Family History   Problem Relation Age of Onset    Diabetes Maternal Half-Brother     Thyroid Disease Maternal Half-Brother     Thyroid Disease Maternal Half-Sister     Cancer Maternal Grandmother      Family history reviewed and edited as appropriate    Medications and Allergies:     Outpatient Encounter  Medications as of 7/1/2025   Medication Sig Dispense Refill    ACCU-CHEK GUIDE test strip test TWICE DAILY 200 strip 3    azithromycin (ZITHROMAX) 250 MG tablet Take 1 tablet (250 mg) by mouth daily. 30 tablet 0    blood glucose monitoring (SOFTCLIX) lancets Use to test blood sugar 1x  times daily or as directed. 200 each 3    cetirizine (ZYRTEC) 10 MG tablet Take 1 tablet (10 mg) by mouth daily. 14 tablet 1    levonorgestrel (MIRENA) 52 MG (20 mcg/day) IUD by Intrauterine route once.      predniSONE (DELTASONE) 20 MG tablet Take 1 tablet (20 mg) by mouth daily. 7 tablet 0    sodium chloride 0.9 % neb solution Take 5 mLs by nebulization as needed for other (use in nebulizer prevent mucus plugging and difficulty breathing as needed up to 4 times daily). 600 mL 5    STATIN NOT PRESCRIBED (INTENTIONAL) Please choose reason not prescribed from choices below. Patient wants to wait a year.      tirzepatide (MOUNJARO) 12.5 MG/0.5ML SOAJ auto-injector pen Inject 12.5 mg subcutaneously every 7 days 2 mL 2     No facility-administered encounter medications on file as of 7/1/2025.        No Known Allergies     Review of systems:  A full 10 point review of systems was obtained and was negative except for the pertinent positives and negatives stated within the HPI.    Objective Findings:   Physical Exam:    Constitutional: Ht 1.524 m (5')   Wt 83.5 kg (184 lb)   BMI 35.94 kg/m    General: Alert, cooperative, no distress, well-appearing  Head: Atraumatic, normocephalic, no obvious abnormalities   Eyes: Sclera anicteric, no obvious conjunctival hemorrhage   Nose: Nares normal, no obvious malformation, no obvious rhinorrhea   Respiratory: Resting comfortably, no apparent distress, no cough.   Skin: No jaundice, no obvious rash  Neurologic: AAOx3, no obvious neurologic abnormality  Psychiatric: Normal Affect, appropriate mood  Extremities: No obvious edema, no obvious malformation     Labs, Radiology, Pathology     Lab Results  "  Component Value Date    WBC 5.8 11/15/2024    WBC 6.0 04/06/2023    WBC 5.5 04/29/2022    HGB 14.5 11/15/2024    HGB 14.3 04/06/2023    HGB 14.5 01/27/2023     11/15/2024     04/06/2023     04/29/2022    CHOL 177 01/16/2025    CHOL 206 (H) 01/02/2024    CHOL 203 (H) 04/29/2022    TRIG 107 01/16/2025    TRIG 154 (H) 01/02/2024    TRIG 125 04/29/2022    HDL 46 (L) 01/16/2025    HDL 45 (L) 01/02/2024    HDL 57 04/29/2022     11/15/2024     04/06/2023     04/29/2022    BUN 17.2 11/15/2024    BUN 15.9 04/06/2023    BUN 12 04/29/2022    CO2 26 11/15/2024    CO2 25 04/06/2023    CO2 26 04/29/2022    TSH 1.06 08/13/2021    TSH 1.06 05/30/2002        Liver Function Studies - No results for input(s): \"PROTTOTAL\", \"ALBUMIN\", \"BILITOTAL\", \"ALKPHOS\", \"AST\", \"ALT\", \"BILIDIRECT\" in the last 77849 hours.       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 11/15/2024     Priority: Medium    Abnormal uterine bleeding (AUB) 03/05/2024     Priority: Medium     Has iud inserted 2024      Cervical cancer screening 11/29/2023     Priority: Medium     Hx of abnormal pap, Abnormal PAP prior to 2010 11/16/23 NIL Pap, Neg HR HPV Plan cotest in 1 year due 11/16/24 per provider  12/26/24 Lost to follow-up for pap tracking   01/16/25 NIL Pap, Neg HR HPV Plan cotest in 3 years if NIL/Neg then can go back to every 5 year cotesting per provider      Vocal cord disease 03/22/2023     Priority: Medium    Seasonal allergic rhinitis due to pollen 01/30/2023     Priority: Medium    Morbid obesity (H) 11/18/2022     Priority: Medium    Abnormal cytology finding 04/29/2022     Priority: Medium    History of varicella 04/29/2022     Priority: Medium    Mild major depression 04/29/2022     Priority: Medium    Obesity 04/29/2022     Priority: Medium    Type 2 diabetes mellitus (H) 04/29/2022     Priority: Medium    Sinus drainage 04/07/2021     Priority: Medium    "   Assessment and Plan   Assessment/Plan:    Anuradha Brown is a 45 year old female with significant past medical history pertinent for depression, DM II, subglottic stenosis who is presenting as a new patient in consultation at the request of Dr. Polo with a chief complaint of dysphonia, phlegm and throat clearing.    #Phlegm   #Dysphonia/Hoarseness of the Voice   #Throat Clearing     Today Kojo presents in consultation with predominant concerns of increased phlegm production resulting in the need for throat clearing and as a result dysphonia/hoarseness onset after her second intervention with ENT for subglottic stenosis.  She denies classic symptoms of reflux disease including heartburn, regurgitation and substernal chest discomfort.  As patient presents with symptoms that are most consistent with extra esophageal manifestations of reflux disease objective pH monitoring was recommended.      - Trial of an alginate product after meals and prior to bed --> Reflux Gourmet or Gaviscon with alginate.  These products are best found online rather than in store over-the-counter  - Reflux friendly lifestyle modifications as directed within the AVS  - Upper endoscopic evaluation +/- dilation with 96 hour BRAVO study to be completed off acid suppressive therapy.  Please hold your acid suppressive regimen for a minimum of 2 weeks prior to the procedure  - Results from the above procedures have been taking approximately 4 to 6 weeks to be finalized.  Please follow-up in clinic accordingly=    #Colorectal Cancer Screening   1/5/2025 Cologuard negative. No family history of CRC. Per the most recent update by the [place] Multi-Society Task Force on Colorectal Cancer recall should be 3 years, 1/2028 or sooner if otherwise indicated.       Follow up plan:   Return to clinic 4 months and as needed.    The risks and benefits of my recommendations, as well as other treatment options were discussed with the patient and any available  family today. All questions were answered.     Follow up: As planned above. Today, I personally spent 17 minutes in direct face to face time with the patient. Approximately 14 minutes were spent on indirect care associated with the patient's consultation including but not limited to review of: patient medical records to date, clinic visits, hospital records, lab results, imaging studies, procedural documentation, coordinating care with other providers and further activities per the note. The findings from this review are summarized in the above note. All of the above accounted for a cumulative time of 31 minutes and was performed on the date of service.     The patient verbalized understanding of the plan and was appreciative for the time spent and information provided during the office visit.           Kristan Perla PA-C  Division of Gastroenterology, Hepatology, and Nutrition  [facility]       Documentation assisted by voice recognition and documentation system.

## 2025-07-01 NOTE — LETTER
7/1/2025      Anuradha Brown  [address] [facility] [address]      Dear Colleague,    Thank you for referring your patient, Anuradha Brown, to the [facility]STROENTEROLOG[facility] [place]. Please see a copy of my visit note below.    Virtual Visit Details    Type of service:  Video Visit     Originating Location (pt. Location): Home    Distant Location (provider location):  Off-site  Platform used for Video Visit: [facility]    Gastroenterology Visit for: Anuradha Brown 1980   MRN: [id number]     Reason for Visit:  chief complaint    Referred by: Christ  / Vimal [facility] [id number]  Patient Care Team:  Inocente Gutierrez MD as PCP - General (Family Medicine)  Frw, None as PCP - Obstetrics/Gynecology  Inocente Gutierrez MD as Assigned PCP  Flor Staples, SLP as Speech Language Pathologist (Speech Language/Path)  Haylie Polo MD as Assigned Surgical Provider  Daniela Adhikari RD as Diabetes Educator (Dietitian, Registered)  Cha Le CNM as Midwife (OB/Gyn)  Bisi South MD as Assigned OBGYN Provider  Kristan Perla PA-C as Physician Assistant (Gastroenterology)    History of Present Illness:   Anuradha Brown is a 45 year old female with significant past medical history pertinent for depression, DM II, subglottic stenosis who is presenting as a new patient in consultation at the request of Dr. Polo with a chief complaint of dysphonia, phlegm and throat clearing.    -----------------------------------------------------------------------------------------------------------------------------------------------------------------------------------------------------------------------------------  HPI July 1, 2025:    Reflux - Denies.     Dysphonia/Throat Clearing - After the second intervention had developed concerns with the mucus. Described as the need to clear mucus as she becomes hoarse. Noting that once per day needs to expectorate phlegm/mucus.  "Was treated with ABX and the color of the mucus had changed noting it is no longer green and is clear.     When breathing explains \"I can feel it sometimes like that is something is there.\"     No relation to oral intake. Symptoms not consistent with gustatory rhinitis.     Has not trialed daily consecutive use of antihistamines.     Has not trialed alginate product.     Denies weight loss, nausea, emesis, dysphagia, odynophagia, substernal chest pain, heartburn, regurgitation, waterbrash symptoms, dysgeusia, nocturnal awakenings with coughing/choking or gagging.     No allergy to Nickel.     No family history or GI related malignancy (esophageal, gastric, pancreatic, liver or colon).         Esophageal Questionnaire(s)    BEDQ Questionnaire      7/1/2025     9:44 AM   BEDQ Questionnaire: How Often Have You Had the Following?   Trouble eating solid food (meat, bread, vegetables) 0   Trouble eating soft foods (yogurt, jello, pudding) 0   Trouble swallowing liquids 0   Pain while swallowing 0   Coughing or choking while swallowing foods or liquids 0   Total Score: 0        Patient-reported         7/1/2025     9:44 AM   BEDQ Questionnaire: Discomfort/Pain Ratings   Eating solid food (meat, bread, vegetables) 0   Eating soft foods (yogurt, jello, pudding) 0   Drinking liquid 0   Total Score: 0        Patient-reported       Eckardt Questionnaire      7/1/2025     9:44 AM   Eckardt Questionnaire   Dysphagia 0   Regurgitation 0   Retrosternal Pain 0   Weight Loss (kg) 0   Total Score:  0        Patient-reported       Promis 10 Questionnaire      7/1/2025     9:49 AM   PROMIS 10 FLOWSHEET DATA   In general, would you say your health is: 3   In general, would you say your quality of life is: 4   In general, how would you rate your physical health? 2   In general, how would you rate your mental health, including your mood and your ability to think? 3   In general, how would you rate your satisfaction with your social " activities and relationships? 4   In general, please rate how well you carry out your usual social activities and roles. (This includes activities at home, at work and in your community, and responsibilities as a parent, child, spouse, employee, friend, etc.) 3   To what extent are you able to carry out your everyday physical activities such as walking, climbing stairs, carrying groceries, or moving a chair? 5   In the past 7 days, how often have you been bothered by emotional problems such as feeling anxious, depressed, or irritable? 2   In the past 7 days, how would you rate your fatigue on average? 3   In the past 7 days, how would you rate your pain on average, where 0 means no pain, and 10 means worst imaginable pain? 0   Mental health question re-calculation - no clinical value 4    Physical health question re-calculation - no clinical value 3    Pain question re-calculation - no clinical value 5    Global Mental Health Score 15    Global Physical Health Score 15    PROMIS TOTAL - SUBSCORES 30        Patient-reported           STUDIES & PROCEDURES:    EGD:       Colonoscopy:     EndoFLIP directed at the [facility] or LES (8cm (EF-325) balloon length or 16cm (EF-322) balloon length):   Date:  8cm balloon  Balloon inflation Balloon pressure CSA (mm^2) DI (mm^2/mmHg) Dmin (mm) Compliance   20 (ladmark ID)        30        40        50           16cm balloon  Balloon inflation Balloon pressure CSA (mm^2) DI (mm^2/mmHg) Dmin (mm) Compliance   30 (ladmark ID)        40        50        60        70           High Resolution Manometry:    PH/Impedance:     Bravo:    CT:    Esophagram:    FL VSS:     GES:    U/S:     XRAY:    Other:       Prior medical records were reviewed including, but not limited to, notes from referring providers, lab work, radiographic tests, and other diagnostic tests. Pertinent results were summarized above.     History     Past Medical History:   Diagnosis Date     Allergic rhinitis 2020      Arthritis      Diabetes (H) 11/2021     Hoarseness      Other and unspecified ovarian cyst 09/23/1998    ultrasound done     Pneumonia 2015     Uncomplicated asthma 11/2021       Past Surgical History:   Procedure Laterality Date     BRONCHOSCOPY FLEXIBLE N/A 4/19/2023    Procedure: Bronchoscopy flexible and biopsy;  Surgeon: Haylie Polo MD;  Location: [facility]     INJECT STEROID (LOCATION) N/A 4/19/2023    Procedure: steroid injection;  Surgeon: Haylie Polo MD;  Location: [facility]     INJECT STEROID (LOCATION) N/A 11/20/2024    Procedure: steroid injection;  Surgeon: Haylie Polo MD;  Location: [facility]     LASER CO2 LARYNGOSCOPY N/A 4/19/2023    Procedure: carbon dioxide laser resection of stenosis, CRE balloon dilation;  Surgeon: Haylie Polo MD;  Location: [facility] OR     LASER CO2 LARYNGOSCOPY, COMPLEX N/A 11/20/2024    Procedure: MICROLARYNGOSCOPY, flexible bronchoscopy, carbon dioxide laser resection of stenosis, CRE balloon dilation,;  Surgeon: Haylie Polo MD;  Location: [facility] OR       Social History     Socioeconomic History     Marital status:      Spouse name: Not on file     Number of children: Not on file     Years of education: Not on file     Highest education level: Not on file   Occupational History     Not on file   Tobacco Use     Smoking status: Never     Passive exposure: Never     Smokeless tobacco: Never   Vaping Use     Vaping status: Never Used   Substance and Sexual Activity     Alcohol use: Yes     Comment: once a month     Drug use: Never     Sexual activity: Yes     Partners: Male     Birth control/protection: None     Comment: I want to talk about getting an ablation.  cramps bad   Other Topics Concern     Parent/sibling w/ CABG, MI or angioplasty before 65F 55M? No   Social History Narrative     Not on file     Social Drivers of Health     Financial Resource Strain: Low Risk  (1/16/2025)    Financial Resource Strain      Within the past 12 months, have you or your family members you live  with been unable to get utilities (heat, electricity) when it was really needed?: No   Food Insecurity: Low Risk  (1/16/2025)    Food Insecurity      Within the past 12 months, did you worry that your food would run out before you got money to buy more?: No      Within the past 12 months, did the food you bought just not last and you didn t have money to get more?: No   Transportation Needs: Low Risk  (1/16/2025)    Transportation Needs      Within the past 12 months, has lack of transportation kept you from medical appointments, getting your medicines, non-medical meetings or appointments, work, or from getting things that you need?: No   Physical Activity: Insufficiently Active (1/16/2025)    Exercise Vital Sign      Days of Exercise per Week: 3 days      Minutes of Exercise per Session: 30 min   Stress: No Stress Concern Present (1/16/2025)    [place] [employer] - Occupational Stress Questionnaire      Feeling of Stress : Only a little   Social Connections: Unknown (1/16/2025)    Social Connection and Isolation Panel [NHANES]      Frequency of Communication with Friends and Family: Not on file      Frequency of Social Gatherings with Friends and Family: Once a week      Attends [employer] Services: Not on file      Active Member of Clubs or Organizations: Not on file      Attends Club or Organization Meetings: Not on file      Marital Status: Not on file   Interpersonal Safety: Low Risk  (1/16/2025)    Interpersonal Safety      Do you feel physically and emotionally safe where you currently live?: Yes      Within the past 12 months, have you been hit, slapped, kicked or otherwise physically hurt by someone?: No      Within the past 12 months, have you been humiliated or emotionally abused in other ways by your partner or ex-partner?: No   Housing Stability: Low Risk  (1/16/2025)    Housing Stability      Do you have housing? : Yes      Are you worried about losing your housing?: No        Family History   Problem Relation Age of Onset     Diabetes Maternal Half-Brother      Thyroid Disease Maternal Half-Brother      Thyroid Disease Maternal Half-Sister      Cancer Maternal Grandmother      Family history reviewed and edited as appropriate    Medications and Allergies:     Outpatient Encounter Medications as of 7/1/2025   Medication Sig Dispense Refill     ACCU-CHEK GUIDE test strip test TWICE DAILY 200 strip 3     azithromycin (ZITHROMAX) 250 MG tablet Take 1 tablet (250 mg) by mouth daily. 30 tablet 0     blood glucose monitoring (SOFTCLIX) lancets Use to test blood sugar 1x  times daily or as directed. 200 each 3     cetirizine (ZYRTEC) 10 MG tablet Take 1 tablet (10 mg) by mouth daily. 14 tablet 1     levonorgestrel (MIRENA) 52 MG (20 mcg/day) IUD by Intrauterine route once.       predniSONE (DELTASONE) 20 MG tablet Take 1 tablet (20 mg) by mouth daily. 7 tablet 0     sodium chloride 0.9 % neb solution Take 5 mLs by nebulization as needed for other (use in nebulizer prevent mucus plugging and difficulty breathing as needed up to 4 times daily). 600 mL 5     STATIN NOT PRESCRIBED (INTENTIONAL) Please choose reason not prescribed from choices below. Patient wants to wait a year.       tirzepatide (MOUNJARO) 12.5 MG/0.5ML SOAJ auto-injector pen Inject 12.5 mg subcutaneously every 7 days 2 mL 2     No facility-administered encounter medications on file as of 7/1/2025.        No Known Allergies     Review of systems:  A full 10 point review of systems was obtained and was negative except for the pertinent positives and negatives stated within the HPI.    Objective Findings:   Physical Exam:    Constitutional: Ht 1.524 m (5')   Wt 83.5 kg (184 lb)   BMI 35.94 kg/m    General: Alert, cooperative, no distress, well-appearing  Head: Atraumatic, normocephalic, no obvious abnormalities   Eyes: Sclera anicteric, no obvious conjunctival hemorrhage   Nose: Nares normal, no obvious malformation, no  "obvious rhinorrhea   Respiratory: Resting comfortably, no apparent distress, no cough.   Skin: No jaundice, no obvious rash  Neurologic: AAOx3, no obvious neurologic abnormality  Psychiatric: Normal Affect, appropriate mood  Extremities: No obvious edema, no obvious malformation     Labs, Radiology, Pathology     Lab Results   Component Value Date    WBC 5.8 11/15/2024    WBC 6.0 04/06/2023    WBC 5.5 04/29/2022    HGB 14.5 11/15/2024    HGB 14.3 04/06/2023    HGB 14.5 01/27/2023     11/15/2024     04/06/2023     04/29/2022    CHOL 177 01/16/2025    CHOL 206 (H) 01/02/2024    CHOL 203 (H) 04/29/2022    TRIG 107 01/16/2025    TRIG 154 (H) 01/02/2024    TRIG 125 04/29/2022    HDL 46 (L) 01/16/2025    HDL 45 (L) 01/02/2024    HDL 57 04/29/2022     11/15/2024     04/06/2023     04/29/2022    BUN 17.2 11/15/2024    BUN 15.9 04/06/2023    BUN 12 04/29/2022    CO2 26 11/15/2024    CO2 25 04/06/2023    CO2 26 04/29/2022    TSH 1.06 08/13/2021    TSH 1.06 05/30/2002        Liver Function Studies - No results for input(s): \"PROTTOTAL\", \"ALBUMIN\", \"BILITOTAL\", \"ALKPHOS\", \"AST\", \"ALT\", \"BILIDIRECT\" in the last 98056 hours.       Patient Active Problem List    Diagnosis Date Noted     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 11/15/2024     Priority: Medium     Abnormal uterine bleeding (AUB) 03/05/2024     Priority: Medium     Has iud inserted 2024       Cervical cancer screening 11/29/2023     Priority: Medium     Hx of abnormal pap, Abnormal PAP prior to 2010 11/16/23 NIL Pap, Neg HR HPV Plan cotest in 1 year due 11/16/24 per provider  12/26/24 Lost to follow-up for pap tracking   01/16/25 NIL Pap, Neg HR HPV Plan cotest in 3 years if NIL/Neg then can go back to every 5 year cotesting per provider       Vocal cord disease 03/22/2023     Priority: Medium     Seasonal allergic rhinitis due to pollen 01/30/2023     Priority: Medium     Morbid obesity (H) 11/18/2022     Priority: Medium     " Abnormal cytology finding 04/29/2022     Priority: Medium     History of varicella 04/29/2022     Priority: Medium     Mild major depression 04/29/2022     Priority: Medium     Obesity 04/29/2022     Priority: Medium     Type 2 diabetes mellitus (H) 04/29/2022     Priority: Medium     Sinus drainage 04/07/2021     Priority: Medium      Assessment and Plan   Assessment/Plan:    Anuradha Brown is a 45 year old female with significant past medical history pertinent for depression, DM II, subglottic stenosis who is presenting as a new patient in consultation at the request of Dr. Polo with a chief complaint of dysphonia, phlegm and throat clearing.    #Phlegm   #Dysphonia/Hoarseness of the Voice   #Throat Clearing     Today Kojo presents in consultation with predominant concerns of increased phlegm production resulting in the need for throat clearing and as a result dysphonia/hoarseness onset after her second intervention with ENT for subglottic stenosis.  She denies classic symptoms of reflux disease including heartburn, regurgitation and substernal chest discomfort.  As patient presents with symptoms that are most consistent with extra esophageal manifestations of reflux disease objective pH monitoring was recommended.      - Trial of an alginate product after meals and prior to bed --> Reflux Gourmet or Gaviscon with alginate.  These products are best found online rather than in store over-the-counter  - Reflux friendly lifestyle modifications as directed within the AVS  - Upper endoscopic evaluation +/- dilation with 96 hour BRAVO study to be completed off acid suppressive therapy.  Please hold your acid suppressive regimen for a minimum of 2 weeks prior to the procedure  - Results from the above procedures have been taking approximately 4 to 6 weeks to be finalized.  Please follow-up in clinic accordingly=    #Colorectal Cancer Screening   1/5/2025 Cologuard negative. No family history of CRC. Per the most  recent update by the [place] Multi-Society Task Force on Colorectal Cancer recall should be 3 years, 1/2028 or sooner if otherwise indicated.       Follow up plan:   Return to clinic 4 months and as needed.    The risks and benefits of my recommendations, as well as other treatment options were discussed with the patient and any available family today. All questions were answered.     Follow up: As planned above. Today, I personally spent 17 minutes in direct face to face time with the patient. Approximately 14 minutes were spent on indirect care associated with the patient's consultation including but not limited to review of: patient medical records to date, clinic visits, hospital records, lab results, imaging studies, procedural documentation, coordinating care with other providers and further activities per the note. The findings from this review are summarized in the above note. All of the above accounted for a cumulative time of 31 minutes and was performed on the date of service.     The patient verbalized understanding of the plan and was appreciative for the time spent and information provided during the office visit.           Kristan Perla PA-C  Division of Gastroenterology, Hepatology, and Nutrition  [facility]       Documentation assisted by voice recognition and documentation system.            Again, thank you for allowing me to participate in the care of your patient.        Sincerely,        Kristan Perla PA-C    Electronically signed

## 2025-07-01 NOTE — NURSING NOTE
Current patient location: [address] [place] [id number]    Is the patient currently in the state of [place]? YES    Visit mode: VIDEO    If the visit is dropped, the patient can be reconnected by:VIDEO VISIT: Text to cell phone:   Telephone Information:   Mobile [phone]       Will anyone else be joining the visit? NO  (If patient encounters technical issues they should call [phone]    Are changes needed to the allergy or medication list? No    Are refills needed on medications prescribed by this physician? NO    Rooming Documentation:  Questionnaire(s) completed    Reason for visit: Consult (reflux)    Jillian VILLALPANDOF

## 2025-07-01 NOTE — PATIENT INSTRUCTIONS
It was a pleasure meeting with you today and discussing your healthcare plan. Below is a summary of what we covered:      - Trial of an alginate product after meals and prior to bed --> Reflux Gourmet or Gaviscon with alginate.  These products are best found online rather than in store over-the-counter  - Reflux friendly lifestyle modifications as directed within the AVS  - Upper endoscopic evaluation +/- dilation with 96 hour BRAVO study to be completed off acid suppressive therapy.  Please hold your acid suppressive regimen for a minimum of 2 weeks prior to the procedure  - Results from the above procedures have been taking approximately 4 to 6 weeks to be finalized.  Please follow-up in clinic accordingly      Gastroesophageal Reflux Disease (GERD) Lifestyle Modifications:   If taking acid suppression therapy (PPI ie Pantoprazole, Lansoprazole, Omeprazole, Esomeprazole, Rabeprazole, Dexlansoprazole) it should be taken 30 - 60 minutes prior to meals on an empty stomach to have maximum effect  Avoid triggers for reflux such as coffee, chocolate, carbonated beverages, spicy foods, acidic foods (tomato based/citrus and foods with high fat content   Abstinence from alcohol and cessation of all tobacco products is recommended   Studies have shown that weight loss, exercise and maintaining a healthy BMI significantly reduce GERD symptoms   Remain upright while eating and immediately after meals  Do not eat or drink at least 3 hours prior to laying down supine/laying down for bed   Avoid late night/middle of the night snacking    Consider obtaining a wedge pillow or elevating the head end of the bed while sleeping   Avoid sleeping right side down as this can place the lower part of the esophagus/lower esophageal sphincter in a dependent position that favors reflux   Attempting to eat smaller more frequent meals may improve symptoms         Please call my nurse Radha ([phone]) or Sil ([phone] ) with any  questions or concerns.        See below for any additional questions and scheduling guidelines.    Sign up for [facility]: [facility] patient portal serves as a secure platform for accessing your medical records from the [facility]. Additionally, [facility] facilitates easy, timely, and secure messaging with your care team. If you have not signed up, you may do so by using the provided code or calling [phone].    Coordinating your care after your visit:  There are multiple options for scheduling your follow-up care based on your provider's recommendation.    How do I schedule a follow-up clinic appointment:   After your appointment, you may receive scheduling assistance with the Clinic Coordinators by having a seat in the waiting room and a Clinic Coordinator will call you up to schedule.  Virtual visits or after you leave the clinic:  Your provider has placed a follow-up order in the [facility] portal for scheduling your return appointment. A member of the scheduling team will contact you to schedule.  [employer] Scheduling: Timely scheduling through [facility] is advised to ensure appointment availability.   Call to schedule: You may schedule your follow-up appointment(s) by calling [phone], option 1.    How do I schedule my endoscopy or colonoscopy procedure:  If a procedure, such as a colonoscopy or upper endoscopy was ordered by your provider, the scheduling team will contact you to schedule this procedure. Or you may choose to call to schedule at   [phone], option 2.  Please allow 20-30 minutes when scheduling a procedure.    How do I get my blood work done? To get your blood work done, you need to schedule a lab appointment at an [facility]. There are multiple ways to schedule:   At the clinic: The Clinic Coordinator you meet after your visit can help you schedule a lab appointment.   [facility] scheduling: [facility] offers online lab scheduling at all [facility] laboratory  locations.   Call to schedule: You can call [phone] to schedule your lab appointment.    How do I schedule my imaging study: To schedule imaging studies, such as CT scans, ultrasounds, MRIs, or X-rays, contact Imaging Services at [phone].    How do I schedule a referral to another doctor: If your provider recommended a referral to another specialist(s), the referral order was placed by your provider. You will receive a phone call to schedule this referral, or you may choose to call the number attached to the referral to self-schedule.    For Post-Visit Question(s):  For any inquiries following today's visit:  Please utilize [employer] messaging and allow 48 hours for reply or contact the Call Center during normal business hours at [phone], option 3.  For Emergent After-hours questions, contact the On-Call GI Fellow through the [facility] at [phone].  In addition, you may contact your Nurse directly using the provided contact information.    Test Results:  Test results will be accessible via [employer] in compliance with the 21st Century Cures Act. This means that your results will be available to you at the same time as your provider. Often you may see your results before your provider does. Results are reviewed by staff within two weeks with communication follow-up. Results may be released in the patient portal prior to your care team review.    Prescription Refill(s):  Medication prescribed by your provider will be addressed during your visit. For future refills, please coordinate with your pharmacy. If you have not had a recent clinic visit or routine labs, for your safety, your provider may not be able to refill your prescription.     Clinic Fax Number: [phone]        Sincerely,    Kristan Perla PA-C  Division of Gastroenterology, Hepatology, and Nutrition  [facility]

## 2025-07-05 DIAGNOSIS — E11.9 TYPE 2 DIABETES MELLITUS WITHOUT COMPLICATION, WITHOUT LONG-TERM CURRENT USE OF INSULIN (H): ICD-10-CM

## 2025-07-07 RX ORDER — TIRZEPATIDE 12.5 MG/.5ML
INJECTION, SOLUTION SUBCUTANEOUS
Qty: 2 ML | Refills: 2 | Status: SHIPPED | OUTPATIENT
Start: 2025-07-07

## 2025-07-15 ENCOUNTER — TELEPHONE (OUTPATIENT)
Dept: GASTROENTEROLOGY | Facility: CLINIC | Age: 45
End: 2025-07-15
Payer: COMMERCIAL

## 2025-07-15 NOTE — TELEPHONE ENCOUNTER
Left Voicemail (1st Attempt) for the patient to call back and schedule the following:    Appointment type: return  Provider:  Kristan Perla   Return date: 11/1/2025  Specialty phone number: [phone]  Additional appointment(s) needed:   Additonal Notes:

## 2025-07-17 ENCOUNTER — OFFICE VISIT (OUTPATIENT)
Dept: OTOLARYNGOLOGY | Facility: CLINIC | Age: 45
End: 2025-07-17
Payer: COMMERCIAL

## 2025-07-17 VITALS
HEIGHT: 61 IN | BODY MASS INDEX: 35.3 KG/M2 | WEIGHT: 187 LBS | DIASTOLIC BLOOD PRESSURE: 76 MMHG | SYSTOLIC BLOOD PRESSURE: 114 MMHG

## 2025-07-17 DIAGNOSIS — R49.0 DYSPHONIA: Primary | ICD-10-CM

## 2025-07-17 NOTE — PROGRESS NOTES
[facility]   at the [facility]   Otolaryngology Clinic     Patient: Anuradha Brown    MRN: [id number]    : 1980    Age/Gender: 44 year old female  Date of Service: 2025   Rendering Provider:   Haylie Polo MD         Impression & Plan     IMPRESSION: Ms. Brown is a 44 year old female who is being seen for the following:    Dyspnea  - due to subglottic stenosis   - no intubation history   - has diabetes, on Ozempic, most recent A1C is 6.5 (22)  - denies history of autoimmune disease  - CT chest 23 normal  - PFTs 23 is abnormal, stridor heard on exam  - denies reflux   - BMI is 40  - patient symptoms with shortness of breath with exertion and prolonged talking, one severe episode woke her from sleep and resulted in ER visit, on steroid inhalers but doesn't help  - scope shows grade 3 80% stenosis  - discussed etiology of trauma (intubation), autoimmune, diabetes or idiopathic. In this case this is most likely idiopathic given no prior intubation, but also has component of diabetes, need to rule out autoimmune issues  - discussed treatment with observation, conservative management with oral abx/steroids/reflux precautions, steroid injections, endoscopic procedures, open resection and bypass procedure with tracheotomy  - patient elects to proceed with endoscopic surgery  - s/p CO2 laser, CRE balloon dilation, kenalog injection 23  - symptoms 2023 are improved breathing with average peak flow of 300 and max of 350, peak flow today is 350, has mucus sensation with need to cough and throat clear, hoarse voice with prolonged talking, walked up 3 flights of stairs yesterday  - scope shows grade 1 5% narrowing, mucus in subglottic space  - given mucus sensation, recommend nebulizer or Mucinex, has daughter's nebulizer  - recommend voice therapy  - discussed observation with increased physical activity/weight management and close monitoring of diabetes  "versus conservative management with oral abx/steroids/reflux precautions versus steroid injections  - patient elects observation   - symptoms 9/21/2023 are stable, peak flow is 350, still has phlegm, still working on glucose, not checking too often  - scope shows stable subglottic opening, grade 1, 5% narrowing, mucus   - symptoms 7/9/2024 are stable, peak flow is still 350, not noticing more difficulty  - scope shows grade 1, 30% narrowing  - discussed options of observation, steroid inhaler, steroid injection  - she will think about her options and let me know   - has a nebulizer through her daughters  - symptoms 11/4/2024 are worse. Difficulty breathing with stairs and some talking, feels more mucus. Peak flow between 250 and 300.   - has been using the nebulizer  - scope shows grade 1, 40% narrowing  - had patient work with therapist to rule out vocal fold dysfunction, pt does not have vocal fold syfunction, but has sig strider  - discussed would recommend endoscopic surgery at this given severity of symptoms  - risks include but not limited to bleeding, infection, reaction to the anesthesia, damage to adjacent structures lips, teeth, tongue, larynx, pharynx, \"numb tongue, altered taste, TMJ syndrome, dental injury\", hoarseness, breathing, or swallowing problems  - given a laser can be used risk of an airway fire is also possible  - risk from the dilation include tracheal tear   - s/p MDL, bronch, CO2 laser, resection of stenosis, CRE balloon dilation to 15mmHg, steroid injection 11/20/24  - symptoms 12/19/2024 are improved in terms of her breathing, peak flow meter 350 today, voice is still bothersome  - scope shows grade 1, 5% narrowing with mild mucus in the subglottis  - discussed maintenance option of observation, timed follow up, long term antibiotic treatment, steroid inhaler, reflux control and steroid injections  - patient concerned about steroid injection, facebook group with stories about " complications   - dropped HgA1c to 5.7 and BMI down to 36 - discussed these are very important and helpful results  - patient elects to proceed with steroid inhaler, she did not use this post surgery she thought this was only if she had breathing problems    - symptoms 6/10/2025 are throat clearing, increased mucus sensation, throat clearing, breathing is overall stable peak flow meter still at 350, had voice therapy which she found helpful  - scope shows grade 1-10% narrowing mild redness in the subglottis  - Discussed improving mucus with Mucinex, discussed adding referral to GI to rule out mucus production due to laryngopharyngeal reflux and and trial of antibiotic  - Also discussed addition of steroid injection  - Discussed continuing saline nebulizer and stopping steroid inhaler at this time  - Patient would like to proceed with steroid injection  - s/p steroid injection done today 6/10/25   - Discussed reevaluating in 4 to 6 weeks  - symptoms 7/17/2025 are not improved, still has mucus, may be slightly better after antibiotics but not noticeable  - scope shows persistent redness with stable grade 1, 10% narrowing  - discussed her airway is open but she is dealing with mucous plugs due to dehydration  - Discussed improving hydration and decreasing dehydrating agents  - She does report more stress recently  - She does report seeing GI in having to schedule upcoming testing for this    Plan  - Saline nebulizer twice daily  - Mucinex  - Reflux Gourmet after meals and at bedtime  - Salt water gargles after eating  - Reflux gum when feeling more mucus or gaggy  - Pectin based cough drops  - Go on the Facebook group to see other tips and tricks on topical hydration and mucus management  - Stop antibiotic  - If noticed an uptake in mucus after stopping the antibiotic reach out and we will reorder it  - Schedule GI appointments for reflux testing  - Keep measuring peak flow meter if it drops towards 300 please reach  out  - Schedule 3-month follow-up, if mucus is better controlled and cancel and keep the 6-month follow-up only        2. Dysphonia  -  ongoing for a while    - speaking voice is affected  - no pain with voice use  - voice demand is high  - voice goes away with increased talking  - strobe shows supraglottic hyperfunction left more than right, medial edge thickening with hypervascularity on the left  - discussed symptoms 12/19/24 due to mid edge vocal fold thickening with secondary muscle tension  - had voice therapy  Plan  - Observation    RETURN VISIT: 4 to 6 weeks    Chief Complaint   Subglottic stenosis  Dyspnea  S/p CO2 laser, CRE balloon dilation, kenalog injection 4/19/23  S/p microlaryngoscopy, flex bronch, CO2 laser, resection of stenosis, CRE balloon dilation, steroid injection 11/20/24  Interval History   HISTORY OF PRESENT ILLNESS: Ms. Brown is a 42 year old female is being followed for dyspnea. She was initially seen on 3/28/23. Please refer to this note for full history.     Today, she presents for follow up. she reports:  - mucus she coughs up is less green and more clear now  - still having to clear her throat     PAST MEDICAL HISTORY:   Past Medical History:   Diagnosis Date    Arthritis     Diabetes (H) 11/2021    Other and unspecified ovarian cyst 09/23/1998    ultrasound done    Uncomplicated asthma 11/2021       PAST SURGICAL HISTORY:   Past Surgical History:   Procedure Laterality Date    BRONCHOSCOPY FLEXIBLE N/A 4/19/2023    Procedure: Bronchoscopy flexible and biopsy;  Surgeon: Haylie Polo MD;  Location: [facility] OR    INJECT STEROID (LOCATION) N/A 4/19/2023    Procedure: steroid injection;  Surgeon: Haylie Polo MD;  Location: [facility] OR    INJECT STEROID (LOCATION) N/A 11/20/2024    Procedure: steroid injection;  Surgeon: Haylie Polo MD;  Location: [facility] OR    LASER CO2 LARYNGOSCOPY N/A 4/19/2023    Procedure: carbon dioxide laser resection of stenosis, CRE balloon dilation;  Surgeon: Haylie Polo  MD;  Location: [facility] OR    LASER CO2 LARYNGOSCOPY, COMPLEX N/A 11/20/2024    Procedure: MICROLARYNGOSCOPY, flexible bronchoscopy, carbon dioxide laser resection of stenosis, CRE balloon dilation,;  Surgeon: Haylie Polo MD;  Location:  OR       CURRENT MEDICATIONS:   Current Outpatient Medications:     ACCU-CHEK GUIDE test strip, test TWICE DAILY, Disp: 200 strip, Rfl: 3    blood glucose monitoring (SOFTCLIX) lancets, Use to test blood sugar 1x  times daily or as directed., Disp: 200 each, Rfl: 3    cetirizine (ZYRTEC) 10 MG tablet, Take 1 tablet (10 mg) by mouth daily., Disp: 14 tablet, Rfl: 1    levonorgestrel (MIRENA) 52 MG (20 mcg/day) IUD, by Intrauterine route once., Disp: , Rfl:     predniSONE (DELTASONE) 20 MG tablet, Take 1 tablet (20 mg) by mouth daily., Disp: 7 tablet, Rfl: 0    sodium chloride 0.9 % neb solution, Take 5 mLs by nebulization as needed for other (use in nebulizer prevent mucus plugging and difficulty breathing as needed up to 4 times daily)., Disp: 600 mL, Rfl: 5    STATIN NOT PRESCRIBED (INTENTIONAL), Please choose reason not prescribed from choices below. Patient wants to wait a year., Disp: , Rfl:     tirzepatide (MOUNJARO) 12.5 MG/0.5ML SOAJ auto-injector pen, Inject 12.5 mg subcutaneously every 7 days, Disp: 2 mL, Rfl: 2    ALLERGIES: Patient has no known allergies.    SOCIAL HISTORY:    Social History     Socioeconomic History    Marital status:      Spouse name: Not on file    Number of children: Not on file    Years of education: Not on file    Highest education level: Not on file   Occupational History    Not on file   Tobacco Use    Smoking status: Never     Passive exposure: Never    Smokeless tobacco: Never   Vaping Use    Vaping status: Never Used   Substance and Sexual Activity    Alcohol use: Yes     Comment: once a month    Drug use: Never    Sexual activity: Yes     Partners: Male     Birth control/protection: None     Comment: I want to talk about getting an  ablation.  cramps bad   Other Topics Concern    Parent/sibling w/ CABG, MI or angioplasty before 65F 55M? No   Social History Narrative    Not on file     Social Drivers of Health     Financial Resource Strain: Low Risk  (1/16/2025)    Financial Resource Strain     Within the past 12 months, have you or your family members you live with been unable to get utilities (heat, electricity) when it was really needed?: No   Food Insecurity: Low Risk  (1/16/2025)    Food Insecurity     Within the past 12 months, did you worry that your food would run out before you got money to buy more?: No     Within the past 12 months, did the food you bought just not last and you didn t have money to get more?: No   Transportation Needs: Low Risk  (1/16/2025)    Transportation Needs     Within the past 12 months, has lack of transportation kept you from medical appointments, getting your medicines, non-medical meetings or appointments, work, or from getting things that you need?: No   Physical Activity: Insufficiently Active (1/16/2025)    Exercise Vital Sign     Days of Exercise per Week: 3 days     Minutes of Exercise per Session: 30 min   Stress: No Stress Concern Present (1/16/2025)    [place] [facility] Occup[employer] - Occupational Stress Questionnaire     Feeling of Stress : Only a little   Social Connections: Unknown (1/16/2025)    Social Connection and Isolation Panel [NHANES]     Frequency of Communication with Friends and Family: Not on file     Frequency of Social Gatherings with Friends and Family: Once a week     Attends Gnosticism Services: Not on file     Active Member of Clubs or Organizations: Not on file     Attends Club or Organization Meetings: Not on file     Marital Status: Not on file   Interpersonal Safety: Low Risk  (1/16/2025)    Interpersonal Safety     Do you feel physically and emotionally safe where you currently live?: Yes     Within the past 12 months, have you been hit, slapped, kicked or  otherwise physically hurt by someone?: No     Within the past 12 months, have you been humiliated or emotionally abused in other ways by your partner or ex-partner?: No   Housing Stability: Low Risk  (1/16/2025)    Housing Stability     Do you have housing? : Yes     Are you worried about losing your housing?: No         FAMILY HISTORY:   Family History   Problem Relation Age of Onset    Diabetes Maternal Half-Brother     Thyroid Disease Maternal Half-Brother     Thyroid Disease Maternal Half-Sister       Non-contributory for problems with anesthesia    REVIEW OF SYSTEMS:   The patient was asked a 14 point review of systems regarding constitutional symptoms, eye symptoms, ears, nose, mouth, throat symptoms, cardiovascular symptoms, respiratory symptoms, gastrointestinal symptoms, genitourinary symptoms, musculoskeletal symptoms, integumentary symptoms, neurological symptoms, psychiatric symptoms, endocrine symptoms, hematologic/lymphatic symptoms, and allergic/ immunologic symptoms.   The pertinent factors have been included in the HPI and below.  Patient Supplied Answers to Review of Systems      3/28/2023     1:49 PM    ENT ROS   Psychology Frequently feeling anxious   Cardiopulmonary Cough    Breathing problems    Wheezing       Physical Examination   The patient underwent a physical examination as described below. The pertinent positive and negative findings are summarized after the description of the examination.  Constitutional: The patient's developmental and nutritional status was assessed. The patient's voice quality was assessed.  Head and Face: The head and face were inspected for deformities. The sinuses were palpated. The salivary glands were palpated. Facial muscle strength was assessed bilaterally.  Eyes: Extraocular movements and primary gaze alignment were assessed.  Ears, Nose, Mouth and Throat: The ears and nose were examined for deformities. The nasal septum, mucosa, and turbinates were  inspected by anterior rhinoscopy. The lips, teeth, and gums were examined for abnormalities. The oral mucosa, tongue, palate, tonsils, lateral and posterior pharynx were inspected for the presence of asymmetry or mucosal lesions.    Neck: The tracheal position was noted, and the neck mass palpated to determine if there were any asymmetries, abnormal neck masses, thyromegally, or thyroid nodules.  Respiratory: The nature of the breathing and chest expansion/symmetry was observed.  Cardiovascular: The patient was examined to determine the presence of any edema or jugular venous distension.  Abdomen: The contour of the abdomen was noted.  Lymphatic: The patient was examined for infraclavicular lymphadenopathy.  Musculoskeletal: The patient was inspected for the presence of skeletal deformities.  Extremities: The extremities were examined for any clubbing or cyanosis.  Skin: The skin was examined for inflammatory or neoplastic conditions.  Neurologic: The patient's orientation, mood, and affect were noted. The cranial nerve  functions were examined.  Other pertinent positive and negative findings on physical examination:   OC/OP: no lesions, uvula midline, soft palate elevates symmetrically   Neck: no lesions, no TH tenderness to palpation     All other physical examination findings were within normal limits and noncontributory.    Procedures   Flexible laryngoscopy (CPT 79246)      Pre-procedure diagnosis: dysphonia  Post-procedure diagnosis: same as above  Indication for procedure: Ms. Brown is a 45 year old female with see above  Procedure(s): Fiberoptic Laryngoscopy    Details of Procedure: After informed consent was obtained, the patient was seated in the examination chair.  The areas of the nasopharynx as well as the hypopharynx were anesthetized with topical 4% lidocaine with 0.25% phenylephrine atomizer.  Examination of the base of tongue was performed first.  Attention was directed to any evidence of masses in  the area or evidence of leukoplakia or candidal infection.  Attention was directed to the epiglottis where its size and position was determined and its movement on phonation of the vowel  e .  The piriform sinuses were then inspected for any mass lesions or pooling of secretions.  Attention was then directed to the larynx. The vocal folds were inspected for infection or any areas of leukoplakia, for masses, polypoid degeneration, or hemorrhage.  Having done this, the arytenoids and vocal processes were inspected for erythema or evidence of granuloma formation.  The posterior commissure was then inspected for evidence of inflammatory changes in the mucosa and heaping up of mucosal tissue. The patient was then instructed to say the vowel  e .  Adduction of vocal folds to the midline was observed for any evidence of paresis or paralysis of the larynx or asymmetry in rotation of the larynx to the left or right. The patient was asked to breathe and the degree of abduction was noted bilaterally.  Subglottic view of the larynx was obtained for any additional mass lesions or mucosal changes.  Finally the post cricoid was examined for evidence of pooling of secretions, as well as the pharyngeal wall mucosa.   Anesthesia type: 0.25% phenylephrine    Findings:  Anatomic/physiological deviations: RNC, persistent redness with stable grade 1, 10% narrowing   Right vocal process: No restriction of mobility   Left vocal process: No restriction of mobility  Glottal gap: Complete glottal closure  Supraglottic structures: Normal  Hypopharynx: Normal     Estimated Blood Loss: minimal  Complications: None  Disposition: Patient tolerated the procedure well              Review of Relevant Clinical Data   I personally reviewed:  ___________________  Labs:  Lab Results   Component Value Date    TSH 1.06 08/13/2021     Lab Results   Component Value Date     11/15/2024    CO2 26 11/15/2024    BUN 17.2 11/15/2024     Lab Results  "  Component Value Date    WBC 5.8 11/15/2024    HGB 14.5 11/15/2024    HCT 44.1 11/15/2024    MCV 90 11/15/2024     11/15/2024     No results found for: \"PT\", \"PTT\", \"INR\"  No results found for: \"DRAKE\"  No components found for: \"RHEUMATOIDFACTOR\", \"RF\"  No results found for: \"CRP\"  No components found for: \"CKTOT\", \"URICACID\"  No components found for: \"C3\", \"C4\", \"DSDNAAB\", \"NDNAABIFA\"  No results found for: \"MPOAB\"    Patient reported Quality of Life (QOL) Measures   Patient Supplied Answers To VHI Questionnaire      8/7/2023     7:52 AM   Voice Handicap Index (VHI-10)   My voice makes it difficult for people to hear me 2   People have difficulty understanding me in a noisy room 2   My voice difficulties restrict my personal and social life.  1   I feel left out of conversations because of my voice 1   My voice problem causes me to lose income 0   I feel as though I have to strain to produce voice 2   The clarity of my voice is unpredictable 2   My voice problem upsets me 2   My voice makes me feel handicapped 0   People ask, \"What's wrong with your voice?\" 1   VHI-10 13         Patient Supplied Answers To EAT Questionnaire      3/28/2023     2:02 PM   Eating Assessment Tool (EAT-10)   My swallowing problem has caused me to lose weight 0     My swallowing problem interferes with my ability to go out for meals 0     Swallowing liquids takes extra effort 0     Swallowing solids takes extra effort 0     Swallowing pills takes extra effort 0     Swallowing is painful 0     The pleasure of eating is affected by my swallowing 0     When I swallow food sticks in my throat 0     I cough when I eat 0     Swallowing is stressful 0     EAT-10 0        Proxy-reported    Data saved with a previous flowsheet row definition         Patient Supplied Answers To CSI Questionnaire      3/11/2025     8:44 AM   Cough Severity Index (CSI)   My cough is worse when I lie down 0   My coughing problem causes me to restrict my personal " and social life 0   I tend to avoid places because of my cough problem 0   I feel embarrassed because of my coughing problem 2   People ask, ''What's wrong?'' because I cough a lot 3   I run out of air when I cough 2   My coughing problem affects my voice 3   My coughing problem limits my physical activity 1   My coughing problem upsets me 2   People ask me if I am sick because I cough a lot 2   CSI Score 15        Patient-reported         Patient Supplied Answers to Dyspnea Index Questionnaire:      3/23/2023    10:54 AM   Dyspnea Index   1. I have trouble getting air in. 1   2. I feel tightness in my throat when I am having boni breathing problem. 2   3. It takes more effort to breathe than it used to. 3   4. Change in weather affect my breathing problem. 3   5. My breathing gets worse with stress. 2   6. I make sound/noise breathing in 1   7. I have to strain to breathe. 2   8. My shortness of breath gets worse with exercise or physical activity 4   9. My breathing problem makes me feel stressed. 2   10. My breathing problem casuses me to restrict my personal and social life. 2   Dyspnea Index Total Score 22         Scribe Disclosure:   I, Amy Gaffney, am serving as a scribe; to document services personally performed by Haylie Polo MD -based on data collection and the provider's statements to me.     Electronically signed by:    Haylie Polo MD    Laryngology    [facility]  Department of  Otolaryngology - Head and Neck Surgery  [facility] [place] [address]  Appointment line: [phone]  Fax: [phone]  [email or web address]

## 2025-07-17 NOTE — PATIENT INSTRUCTIONS
- Saline nebulizer twice daily  - Mucinex  - Reflux Gourmet after meals and at bedtime  - Salt water gargles after eating  - Reflux gum when feeling more mucus or gaggy  - Pectin based cough drops  - Go on the Facebook group to see other tips and tricks on topical hydration and mucus management  - Stop antibiotic  - If noticed an uptake in mucus after stopping the antibiotic reach out and we will reorder it  - Schedule GI appointments for reflux testing  - Keep measuring peak flow meter if it drops towards 300 please reach out  - Schedule 3-month follow-up, if mucus is better controlled and cancel and keep the 6-month follow-up only       Start reflux gourmet  This will form an algae float to prevent regurgitation   Trial right before bedtime   Also can trial after meals.  Do not take after having carbonated beverage   Do not eat or drink anything else right after you take this as it will break the float    This can be bought on the website of the company or on amazon      You can also try reflux gourmet gum - this is used differently - as this is used when you have symptoms to provide relief rather than preventative which is how the gel is used.  [email or web address]            Cough drops:    Reflux gum

## 2025-07-17 NOTE — LETTER
2025       RE: Anuradha Brown  [address] [facility] [address]     Dear Colleague,    Thank you for referring your patient, Anuradha Brown, to the [facility] [place] [facility]. Please see a copy of my visit note below.        [facility]   at the [facility]   Otolaryngology Clinic     Patient: Anuradha Brown    MRN: [id number]    : 1980    Age/Gender: 44 year old female  Date of Service: 2025   Rendering Provider:   Haylie Polo MD         Impression & Plan     IMPRESSION: Ms. Brown is a 44 year old female who is being seen for the following:    Dyspnea  - due to subglottic stenosis   - no intubation history   - has diabetes, on Ozempic, most recent A1C is 6.5 (22)  - denies history of autoimmune disease  - CT chest 23 normal  - PFTs 23 is abnormal, stridor heard on exam  - denies reflux   - BMI is 40  - patient symptoms with shortness of breath with exertion and prolonged talking, one severe episode woke her from sleep and resulted in ER visit, on steroid inhalers but doesn't help  - scope shows grade 3 80% stenosis  - discussed etiology of trauma (intubation), autoimmune, diabetes or idiopathic. In this case this is most likely idiopathic given no prior intubation, but also has component of diabetes, need to rule out autoimmune issues  - discussed treatment with observation, conservative management with oral abx/steroids/reflux precautions, steroid injections, endoscopic procedures, open resection and bypass procedure with tracheotomy  - patient elects to proceed with endoscopic surgery  - s/p CO2 laser, CRE balloon dilation, kenalog injection 23  - symptoms 2023 are improved breathing with average peak flow of 300 and max of 350, peak flow today is 350, has mucus sensation with need to cough and throat clear, hoarse voice with prolonged talking,  "walked up 3 flights of stairs yesterday  - scope shows grade 1 5% narrowing, mucus in subglottic space  - given mucus sensation, recommend nebulizer or Mucinex, has daughter's nebulizer  - recommend voice therapy  - discussed observation with increased physical activity/weight management and close monitoring of diabetes versus conservative management with oral abx/steroids/reflux precautions versus steroid injections  - patient elects observation   - symptoms 9/21/2023 are stable, peak flow is 350, still has phlegm, still working on glucose, not checking too often  - scope shows stable subglottic opening, grade 1, 5% narrowing, mucus   - symptoms 7/9/2024 are stable, peak flow is still 350, not noticing more difficulty  - scope shows grade 1, 30% narrowing  - discussed options of observation, steroid inhaler, steroid injection  - she will think about her options and let me know   - has a nebulizer through her daughters  - symptoms 11/4/2024 are worse. Difficulty breathing with stairs and some talking, feels more mucus. Peak flow between 250 and 300.   - has been using the nebulizer  - scope shows grade 1, 40% narrowing  - had patient work with therapist to rule out vocal fold dysfunction, pt does not have vocal fold syfunction, but has sig strider  - discussed would recommend endoscopic surgery at this given severity of symptoms  - risks include but not limited to bleeding, infection, reaction to the anesthesia, damage to adjacent structures lips, teeth, tongue, larynx, pharynx, \"numb tongue, altered taste, TMJ syndrome, dental injury\", hoarseness, breathing, or swallowing problems  - given a laser can be used risk of an airway fire is also possible  - risk from the dilation include tracheal tear   - s/p MDL, bronch, CO2 laser, resection of stenosis, CRE balloon dilation to 15mmHg, steroid injection 11/20/24  - symptoms 12/19/2024 are improved in terms of her breathing, peak flow meter 350 today, voice is still " bothersome  - scope shows grade 1, 5% narrowing with mild mucus in the subglottis  - discussed maintenance option of observation, timed follow up, long term antibiotic treatment, steroid inhaler, reflux control and steroid injections  - patient concerned about steroid injection, facebook group with stories about complications   - dropped HgA1c to 5.7 and BMI down to 36 - discussed these are very important and helpful results  - patient elects to proceed with steroid inhaler, she did not use this post surgery she thought this was only if she had breathing problems    - symptoms 6/10/2025 are throat clearing, increased mucus sensation, throat clearing, breathing is overall stable peak flow meter still at 350, had voice therapy which she found helpful  - scope shows grade 1-10% narrowing mild redness in the subglottis  - Discussed improving mucus with Mucinex, discussed adding referral to GI to rule out mucus production due to laryngopharyngeal reflux and and trial of antibiotic  - Also discussed addition of steroid injection  - Discussed continuing saline nebulizer and stopping steroid inhaler at this time  - Patient would like to proceed with steroid injection  - s/p steroid injection done today 6/10/25   - Discussed reevaluating in 4 to 6 weeks  - symptoms 7/17/2025 are not improved, still has mucus, may be slightly better after antibiotics but not noticeable  - scope shows persistent redness with stable grade 1, 10% narrowing  - discussed her airway is open but she is dealing with mucous plugs due to dehydration  - Discussed improving hydration and decreasing dehydrating agents  - She does report more stress recently  - She does report seeing GI in having to schedule upcoming testing for this    Plan  - Saline nebulizer twice daily  - Mucinex  - Reflux Gourmet after meals and at bedtime  - Salt water gargles after eating  - Reflux gum when feeling more mucus or gaggy  - Pectin based cough drops  - Go on the  Facebook group to see other tips and tricks on topical hydration and mucus management  - Stop antibiotic  - If noticed an uptake in mucus after stopping the antibiotic reach out and we will reorder it  - Schedule GI appointments for reflux testing  - Keep measuring peak flow meter if it drops towards 300 please reach out  - Schedule 3-month follow-up, if mucus is better controlled and cancel and keep the 6-month follow-up only        2. Dysphonia  -  ongoing for a while    - speaking voice is affected  - no pain with voice use  - voice demand is high  - voice goes away with increased talking  - strobe shows supraglottic hyperfunction left more than right, medial edge thickening with hypervascularity on the left  - discussed symptoms 12/19/24 due to mid edge vocal fold thickening with secondary muscle tension  - had voice therapy  Plan  - Observation    RETURN VISIT: 4 to 6 weeks    Chief Complaint   Subglottic stenosis  Dyspnea  S/p CO2 laser, CRE balloon dilation, kenalog injection 4/19/23  S/p microlaryngoscopy, flex bronch, CO2 laser, resection of stenosis, CRE balloon dilation, steroid injection 11/20/24  Interval History   HISTORY OF PRESENT ILLNESS: Ms. Brown is a 42 year old female is being followed for dyspnea. She was initially seen on 3/28/23. Please refer to this note for full history.     Today, she presents for follow up. she reports:  - mucus she coughs up is less green and more clear now  - still having to clear her throat     PAST MEDICAL HISTORY:   Past Medical History:   Diagnosis Date     Arthritis      Diabetes (H) 11/2021     Other and unspecified ovarian cyst 09/23/1998    ultrasound done     Uncomplicated asthma 11/2021       PAST SURGICAL HISTORY:   Past Surgical History:   Procedure Laterality Date     BRONCHOSCOPY FLEXIBLE N/A 4/19/2023    Procedure: Bronchoscopy flexible and biopsy;  Surgeon: Haylie Polo MD;  Location: [facility] OR     INJECT STEROID (LOCATION) N/A 4/19/2023    Procedure:  steroid injection;  Surgeon: Haylie Polo MD;  Location: [facility] OR     INJECT STEROID (LOCATION) N/A 11/20/2024    Procedure: steroid injection;  Surgeon: Haylie Polo MD;  Location: [facility] OR     LASER CO2 LARYNGOSCOPY N/A 4/19/2023    Procedure: carbon dioxide laser resection of stenosis, CRE balloon dilation;  Surgeon: Haylie Polo MD;  Location: [facility] OR     LASER CO2 LARYNGOSCOPY, COMPLEX N/A 11/20/2024    Procedure: MICROLARYNGOSCOPY, flexible bronchoscopy, carbon dioxide laser resection of stenosis, CRE balloon dilation,;  Surgeon: Haylie Polo MD;  Location: [facility] OR       CURRENT MEDICATIONS:   Current Outpatient Medications:      ACCU-CHEK GUIDE test strip, test TWICE DAILY, Disp: 200 strip, Rfl: 3     blood glucose monitoring (SOFTCLIX) lancets, Use to test blood sugar 1x  times daily or as directed., Disp: 200 each, Rfl: 3     cetirizine (ZYRTEC) 10 MG tablet, Take 1 tablet (10 mg) by mouth daily., Disp: 14 tablet, Rfl: 1     levonorgestrel (MIRENA) 52 MG (20 mcg/day) IUD, by Intrauterine route once., Disp: , Rfl:      predniSONE (DELTASONE) 20 MG tablet, Take 1 tablet (20 mg) by mouth daily., Disp: 7 tablet, Rfl: 0     sodium chloride 0.9 % neb solution, Take 5 mLs by nebulization as needed for other (use in nebulizer prevent mucus plugging and difficulty breathing as needed up to 4 times daily)., Disp: 600 mL, Rfl: 5     STATIN NOT PRESCRIBED (INTENTIONAL), Please choose reason not prescribed from choices below. Patient wants to wait a year., Disp: , Rfl:      tirzepatide (MOUNJARO) 12.5 MG/0.5ML SOAJ auto-injector pen, Inject 12.5 mg subcutaneously every 7 days, Disp: 2 mL, Rfl: 2    ALLERGIES: Patient has no known allergies.    SOCIAL HISTORY:    Social History     Socioeconomic History     Marital status:      Spouse name: Not on file     Number of children: Not on file     Years of education: Not on file     Highest education level: Not on file   Occupational History     Not on file   Tobacco Use      Smoking status: Never     Passive exposure: Never     Smokeless tobacco: Never   Vaping Use     Vaping status: Never Used   Substance and Sexual Activity     Alcohol use: Yes     Comment: once a month     Drug use: Never     Sexual activity: Yes     Partners: Male     Birth control/protection: None     Comment: I want to talk about getting an ablation.  cramps bad   Other Topics Concern     Parent/sibling w/ CABG, MI or angioplasty before 65F 55M? No   Social History Narrative     Not on file     Social Drivers of Health     Financial Resource Strain: Low Risk  (1/16/2025)    Financial Resource Strain      Within the past 12 months, have you or your family members you live with been unable to get utilities (heat, electricity) when it was really needed?: No   Food Insecurity: Low Risk  (1/16/2025)    Food Insecurity      Within the past 12 months, did you worry that your food would run out before you got money to buy more?: No      Within the past 12 months, did the food you bought just not last and you didn t have money to get more?: No   Transportation Needs: Low Risk  (1/16/2025)    Transportation Needs      Within the past 12 months, has lack of transportation kept you from medical appointments, getting your medicines, non-medical meetings or appointments, work, or from getting things that you need?: No   Physical Activity: Insufficiently Active (1/16/2025)    Exercise Vital Sign      Days of Exercise per Week: 3 days      Minutes of Exercise per Session: 30 min   Stress: No Stress Concern Present (1/16/2025)    [employer] - Occupational Stress Questionnaire      Feeling of Stress : Only a little   Social Connections: Unknown (1/16/2025)    Social Connection and Isolation Panel [NHANES]      Frequency of Communication with Friends and Family: Not on file      Frequency of Social Gatherings with Friends and Family: Once a week      Attends [employer] Services: Not on file      Active  Member of Clubs or Organizations: Not on file      Attends Club or Organization Meetings: Not on file      Marital Status: Not on file   Interpersonal Safety: Low Risk  (1/16/2025)    Interpersonal Safety      Do you feel physically and emotionally safe where you currently live?: Yes      Within the past 12 months, have you been hit, slapped, kicked or otherwise physically hurt by someone?: No      Within the past 12 months, have you been humiliated or emotionally abused in other ways by your partner or ex-partner?: No   Housing Stability: Low Risk  (1/16/2025)    Housing Stability      Do you have housing? : Yes      Are you worried about losing your housing?: No         FAMILY HISTORY:   Family History   Problem Relation Age of Onset     Diabetes Maternal Half-Brother      Thyroid Disease Maternal Half-Brother      Thyroid Disease Maternal Half-Sister       Non-contributory for problems with anesthesia    REVIEW OF SYSTEMS:   The patient was asked a 14 point review of systems regarding constitutional symptoms, eye symptoms, ears, nose, mouth, throat symptoms, cardiovascular symptoms, respiratory symptoms, gastrointestinal symptoms, genitourinary symptoms, musculoskeletal symptoms, integumentary symptoms, neurological symptoms, psychiatric symptoms, endocrine symptoms, hematologic/lymphatic symptoms, and allergic/ immunologic symptoms.   The pertinent factors have been included in the HPI and below.  Patient Supplied Answers to Review of Systems      3/28/2023     1:49 PM   [facility] ROS   Psychology Frequently feeling anxious   Cardiopulmonary Cough    Breathing problems    Wheezing       Physical Examination   The patient underwent a physical examination as described below. The pertinent positive and negative findings are summarized after the description of the examination.  Constitutional: The patient's developmental and nutritional status was assessed. The patient's voice quality was assessed.  Head and Face: The  head and face were inspected for deformities. The sinuses were palpated. The salivary glands were palpated. Facial muscle strength was assessed bilaterally.  Eyes: Extraocular movements and primary gaze alignment were assessed.  Ears, Nose, Mouth and Throat: The ears and nose were examined for deformities. The nasal septum, mucosa, and turbinates were inspected by anterior rhinoscopy. The lips, teeth, and gums were examined for abnormalities. The oral mucosa, tongue, palate, tonsils, lateral and posterior pharynx were inspected for the presence of asymmetry or mucosal lesions.    Neck: The tracheal position was noted, and the neck mass palpated to determine if there were any asymmetries, abnormal neck masses, thyromegally, or thyroid nodules.  Respiratory: The nature of the breathing and chest expansion/symmetry was observed.  Cardiovascular: The patient was examined to determine the presence of any edema or jugular venous distension.  Abdomen: The contour of the abdomen was noted.  Lymphatic: The patient was examined for infraclavicular lymphadenopathy.  Musculoskeletal: The patient was inspected for the presence of skeletal deformities.  Extremities: The extremities were examined for any clubbing or cyanosis.  Skin: The skin was examined for inflammatory or neoplastic conditions.  Neurologic: The patient's orientation, mood, and affect were noted. The cranial nerve  functions were examined.  Other pertinent positive and negative findings on physical examination:   OC/OP: no lesions, uvula midline, soft palate elevates symmetrically   Neck: no lesions, no TH tenderness to palpation     All other physical examination findings were within normal limits and noncontributory.    Procedures   Flexible laryngoscopy (CPT 30257)      Pre-procedure diagnosis: dysphonia  Post-procedure diagnosis: same as above  Indication for procedure: Ms. Brown is a 45 year old female with see above  Procedure(s): Fiberoptic  Laryngoscopy    Details of Procedure: After informed consent was obtained, the patient was seated in the examination chair.  The areas of the nasopharynx as well as the hypopharynx were anesthetized with topical 4% lidocaine with 0.25% phenylephrine atomizer.  Examination of the base of tongue was performed first.  Attention was directed to any evidence of masses in the area or evidence of leukoplakia or candidal infection.  Attention was directed to the epiglottis where its size and position was determined and its movement on phonation of the vowel  e .  The piriform sinuses were then inspected for any mass lesions or pooling of secretions.  Attention was then directed to the larynx. The vocal folds were inspected for infection or any areas of leukoplakia, for masses, polypoid degeneration, or hemorrhage.  Having done this, the arytenoids and vocal processes were inspected for erythema or evidence of granuloma formation.  The posterior commissure was then inspected for evidence of inflammatory changes in the mucosa and heaping up of mucosal tissue. The patient was then instructed to say the vowel  e .  Adduction of vocal folds to the midline was observed for any evidence of paresis or paralysis of the larynx or asymmetry in rotation of the larynx to the left or right. The patient was asked to breathe and the degree of abduction was noted bilaterally.  Subglottic view of the larynx was obtained for any additional mass lesions or mucosal changes.  Finally the post cricoid was examined for evidence of pooling of secretions, as well as the pharyngeal wall mucosa.   Anesthesia type: 0.25% phenylephrine    Findings:  Anatomic/physiological deviations: RNC, persistent redness with stable grade 1, 10% narrowing   Right vocal process: No restriction of mobility   Left vocal process: No restriction of mobility  Glottal gap: Complete glottal closure  Supraglottic structures: Normal  Hypopharynx: Normal     Estimated Blood  "Loss: minimal  Complications: None  Disposition: Patient tolerated the procedure well              Review of Relevant Clinical Data   I personally reviewed:  ___________________  Labs:  Lab Results   Component Value Date    TSH 1.06 08/13/2021     Lab Results   Component Value Date     11/15/2024    CO2 26 11/15/2024    BUN 17.2 11/15/2024     Lab Results   Component Value Date    WBC 5.8 11/15/2024    HGB 14.5 11/15/2024    HCT 44.1 11/15/2024    MCV 90 11/15/2024     11/15/2024     No results found for: \"PT\", \"PTT\", \"INR\"  No results found for: \"DRAKE\"  No components found for: \"RHEUMATOIDFACTOR\", \"RF\"  No results found for: \"CRP\"  No components found for: \"CKTOT\", \"URICACID\"  No components found for: \"C3\", \"C4\", \"DSDNAAB\", \"NDNAABIFA\"  No results found for: \"MPOAB\"    Patient reported Quality of Life (QOL) Measures   Patient Supplied Answers To VHI Questionnaire      8/7/2023     7:52 AM   Voice Handicap Index (VHI-10)   My voice makes it difficult for people to hear me 2   People have difficulty understanding me in a noisy room 2   My voice difficulties restrict my personal and social life.  1   I feel left out of conversations because of my voice 1   My voice problem causes me to lose income 0   I feel as though I have to strain to produce voice 2   The clarity of my voice is unpredictable 2   My voice problem upsets me 2   My voice makes me feel handicapped 0   People ask, \"What's wrong with your voice?\" 1   VHI-10 13         Patient Supplied Answers To EAT Questionnaire      3/28/2023     2:02 PM   Eating Assessment Tool (EAT-10)   My swallowing problem has caused me to lose weight 0     My swallowing problem interferes with my ability to go out for meals 0     Swallowing liquids takes extra effort 0     Swallowing solids takes extra effort 0     Swallowing pills takes extra effort 0     Swallowing is painful 0     The pleasure of eating is affected by my swallowing 0     When I swallow food sticks " in my throat 0     I cough when I eat 0     Swallowing is stressful 0     EAT-10 0        Proxy-reported    Data saved with a previous flowsheet row definition         Patient Supplied Answers To CSI Questionnaire      3/11/2025     8:44 AM   Cough Severity Index (CSI)   My cough is worse when I lie down 0   My coughing problem causes me to restrict my personal and social life 0   I tend to avoid places because of my cough problem 0   I feel embarrassed because of my coughing problem 2   People ask, ''What's wrong?'' because I cough a lot 3   I run out of air when I cough 2   My coughing problem affects my voice 3   My coughing problem limits my physical activity 1   My coughing problem upsets me 2   People ask me if I am sick because I cough a lot 2   CSI Score 15        Patient-reported         Patient Supplied Answers to Dyspnea Index Questionnaire:      3/23/2023    10:54 AM   Dyspnea Index   1. I have trouble getting air in. 1   2. I feel tightness in my throat when I am having boni breathing problem. 2   3. It takes more effort to breathe than it used to. 3   4. Change in weather affect my breathing problem. 3   5. My breathing gets worse with stress. 2   6. I make sound/noise breathing in 1   7. I have to strain to breathe. 2   8. My shortness of breath gets worse with exercise or physical activity 4   9. My breathing problem makes me feel stressed. 2   10. My breathing problem casuses me to restrict my personal and social life. 2   Dyspnea Index Total Score 22         Scribe Disclosure:   I, Amy Yeimy, am serving as a scribe; to document services personally performed by Haylie Polo MD -based on data collection and the provider's statements to me.     Electronically signed by:    Haylie Polo MD    Laryngology    [facility]  Department of  Otolaryngology - Head and Neck Surgery  [facility] [address] [address]  Appointment line:  [phone]  Fax: [phone]  [email or web address]       Again, thank you for allowing me to participate in the care of your patient.      Sincerely,    Haylie Polo MD

## 2025-07-17 NOTE — PATIENT INSTRUCTIONS
1.  You were seen in the ENT Clinic today by Dr. Polo. If you have any questions or concerns after your appointment, please call [phone]. Press option #1 for scheduling related needs. Press option #3 for Nurse advice.     2.  Dr. Polo has recommended the following:                             3.  Plan is to return to clinic      How to Contact Us:  Send a [employer] message to your provider. Our team will respond to you via [employer]. Occasionally, we will need to call you to get further information.  For urgent matters (Monday-Friday, 8:00 AM-3:30 PM), call the ENT Clinic: [phone] and speak with a call center team member - they will route your call appropriately.           Heather Jo LPN  [phone]  [facility] - Otolaryngology

## 2025-08-13 ENCOUNTER — TELEPHONE (OUTPATIENT)
Dept: GASTROENTEROLOGY | Facility: CLINIC | Age: 45
End: 2025-08-13
Payer: COMMERCIAL

## (undated) DEVICE — PACK ENT ENDOSCOPY UMMC

## (undated) DEVICE — LINEN TOWEL PACK X5 5464

## (undated) DEVICE — GLOVE BIOGEL PI ULTRATOUCH SZ 6.5 41165

## (undated) DEVICE — DRSG EYE PAD STERILE 1.63X2.63" NON21600

## (undated) DEVICE — TUBING SMOKE EVAC 2.2CMX3M SEA3715

## (undated) DEVICE — ENDO VALVE SUCTION BRONCH EVIS MAJ-209

## (undated) DEVICE — FIBER LSR 2MR 1.04MM .5MM ACUPULSE FIBERLASE 40W 295UM CO2

## (undated) DEVICE — SPONGE COTTONOID 1/2X3" 80-1407

## (undated) DEVICE — TUBING SUCTION 10'X3/16" N510

## (undated) DEVICE — DILATOR CRE PULM BALLOON 12-13.5-15MMX75CM 3CM WIRE 5035

## (undated) DEVICE — ENDO FORCEP ALLIGATOR JAW BIOPSY 2MMX100CM FB-211D

## (undated) DEVICE — NDL BUTTERFLY 25GA .75" 367298

## (undated) DEVICE — ENDO VALVE BX EVIS MAJ-210

## (undated) DEVICE — KIT ENDO FIRST STEP DISINFECTANT 200ML W/POUCH EP-4

## (undated) DEVICE — DRSG TELFA 3X8" 1238

## (undated) DEVICE — ENDO TOOTH QUICK GUARD CONFORMING PROTECTION

## (undated) DEVICE — ENDO INFLATION HANDLE ALLIANCE II  M00550620

## (undated) DEVICE — LASER FIBER CO2 40W ACUPULSE AC-1059590

## (undated) DEVICE — SUCTION MANIFOLD NEPTUNE 2 SYS 4 PORT 0702-020-000

## (undated) DEVICE — ANTIFOG SOLUTION W/FOAM PAD 31142527

## (undated) DEVICE — SYR 03ML LL W/O NDL 309657

## (undated) DEVICE — JELLY LUBRICATING SURGILUBE 2OZ TUBE

## (undated) DEVICE — NDL SCLEROTHERAPY 1.8MM 26GA 200CM M00518160

## (undated) DEVICE — SOL NACL 0.9% IRRIG 1000ML BOTTLE 2F7124

## (undated) DEVICE — DILATOR CRE PULM BALLOON 10-11-12MMX75CM 3CM WIRE 5034

## (undated) DEVICE — SYR INFLATOR AND GAUGE 60ML M00550601

## (undated) DEVICE — ENDO ADPT BRONCH SWIVEL Y A1002

## (undated) RX ORDER — FENTANYL CITRATE 50 UG/ML
INJECTION, SOLUTION INTRAMUSCULAR; INTRAVENOUS
Status: DISPENSED
Start: 2023-04-19

## (undated) RX ORDER — TRIAMCINOLONE ACETONIDE 40 MG/ML
INJECTION, SUSPENSION INTRA-ARTICULAR; INTRAMUSCULAR
Status: DISPENSED
Start: 2024-11-20

## (undated) RX ORDER — ACETAMINOPHEN 325 MG/10.15ML
LIQUID ORAL
Status: DISPENSED
Start: 2023-04-19

## (undated) RX ORDER — DEXAMETHASONE SODIUM PHOSPHATE 4 MG/ML
INJECTION, SOLUTION INTRA-ARTICULAR; INTRALESIONAL; INTRAMUSCULAR; INTRAVENOUS; SOFT TISSUE
Status: DISPENSED
Start: 2023-04-19

## (undated) RX ORDER — FENTANYL CITRATE-0.9 % NACL/PF 10 MCG/ML
PLASTIC BAG, INJECTION (ML) INTRAVENOUS
Status: DISPENSED
Start: 2024-11-20

## (undated) RX ORDER — ONDANSETRON 2 MG/ML
INJECTION INTRAMUSCULAR; INTRAVENOUS
Status: DISPENSED
Start: 2023-04-19

## (undated) RX ORDER — CEFAZOLIN SODIUM/WATER 2 G/20 ML
SYRINGE (ML) INTRAVENOUS
Status: DISPENSED
Start: 2024-11-20

## (undated) RX ORDER — PROPOFOL 10 MG/ML
INJECTION, EMULSION INTRAVENOUS
Status: DISPENSED
Start: 2024-11-20

## (undated) RX ORDER — PROPOFOL 10 MG/ML
INJECTION, EMULSION INTRAVENOUS
Status: DISPENSED
Start: 2023-04-19

## (undated) RX ORDER — FENTANYL CITRATE 50 UG/ML
INJECTION, SOLUTION INTRAMUSCULAR; INTRAVENOUS
Status: DISPENSED
Start: 2024-11-20

## (undated) RX ORDER — TRIAMCINOLONE ACETONIDE 40 MG/ML
INJECTION, SUSPENSION INTRA-ARTICULAR; INTRAMUSCULAR
Status: DISPENSED
Start: 2025-06-10

## (undated) RX ORDER — ONDANSETRON 2 MG/ML
INJECTION INTRAMUSCULAR; INTRAVENOUS
Status: DISPENSED
Start: 2024-11-20

## (undated) RX ORDER — ACETAMINOPHEN 325 MG/1
TABLET ORAL
Status: DISPENSED
Start: 2024-11-20

## (undated) RX ORDER — LIDOCAINE HYDROCHLORIDE 20 MG/ML
SOLUTION OROPHARYNGEAL
Status: DISPENSED
Start: 2023-03-28

## (undated) RX ORDER — OXYCODONE HYDROCHLORIDE 5 MG/1
TABLET ORAL
Status: DISPENSED
Start: 2023-04-19

## (undated) RX ORDER — DEXAMETHASONE SODIUM PHOSPHATE 4 MG/ML
INJECTION, SOLUTION INTRA-ARTICULAR; INTRALESIONAL; INTRAMUSCULAR; INTRAVENOUS; SOFT TISSUE
Status: DISPENSED
Start: 2024-11-20